# Patient Record
Sex: FEMALE | Race: WHITE | NOT HISPANIC OR LATINO | Employment: OTHER | URBAN - METROPOLITAN AREA
[De-identification: names, ages, dates, MRNs, and addresses within clinical notes are randomized per-mention and may not be internally consistent; named-entity substitution may affect disease eponyms.]

---

## 2017-04-12 ENCOUNTER — OFFICE VISIT (OUTPATIENT)
Dept: INTERNAL MEDICINE | Facility: CLINIC | Age: 27
End: 2017-04-12

## 2017-04-12 VITALS
WEIGHT: 150 LBS | HEIGHT: 62 IN | BODY MASS INDEX: 27.6 KG/M2 | SYSTOLIC BLOOD PRESSURE: 94 MMHG | DIASTOLIC BLOOD PRESSURE: 66 MMHG | TEMPERATURE: 97.9 F | HEART RATE: 68 BPM | RESPIRATION RATE: 19 BRPM

## 2017-04-12 DIAGNOSIS — E28.2 PCOS (POLYCYSTIC OVARIAN SYNDROME): ICD-10-CM

## 2017-04-12 DIAGNOSIS — B36.0 TINEA VERSICOLOR: ICD-10-CM

## 2017-04-12 DIAGNOSIS — Z13.6 SCREENING FOR CARDIOVASCULAR CONDITION: ICD-10-CM

## 2017-04-12 DIAGNOSIS — F41.1 GENERALIZED ANXIETY DISORDER: Primary | ICD-10-CM

## 2017-04-12 PROBLEM — K59.00 CONSTIPATION: Status: ACTIVE | Noted: 2017-04-12

## 2017-04-12 LAB
25(OH)D3 SERPL-MCNC: 18.4 NG/ML
ALBUMIN SERPL-MCNC: 4.4 G/DL (ref 3.2–4.8)
ALBUMIN/GLOB SERPL: 1.6 G/DL (ref 1.5–2.5)
ALP SERPL-CCNC: 34 U/L (ref 25–100)
ALT SERPL W P-5'-P-CCNC: 18 U/L (ref 7–40)
ANION GAP SERPL CALCULATED.3IONS-SCNC: 5 MMOL/L (ref 3–11)
AST SERPL-CCNC: 13 U/L (ref 0–33)
BASOPHILS # BLD AUTO: 0.02 10*3/MM3 (ref 0–0.2)
BASOPHILS NFR BLD AUTO: 0.4 % (ref 0–1)
BILIRUB SERPL-MCNC: 0.3 MG/DL (ref 0.3–1.2)
BUN BLD-MCNC: 11 MG/DL (ref 9–23)
BUN/CREAT SERPL: 13.8 (ref 7–25)
CALCIUM SPEC-SCNC: 9.9 MG/DL (ref 8.7–10.4)
CHLORIDE SERPL-SCNC: 104 MMOL/L (ref 99–109)
CHOLEST SERPL-MCNC: 191 MG/DL (ref 0–200)
CO2 SERPL-SCNC: 29 MMOL/L (ref 20–31)
CREAT BLD-MCNC: 0.8 MG/DL (ref 0.6–1.3)
DEPRECATED RDW RBC AUTO: 44 FL (ref 37–54)
EOSINOPHIL # BLD AUTO: 0.2 10*3/MM3 (ref 0.1–0.3)
EOSINOPHIL NFR BLD AUTO: 4.1 % (ref 0–3)
ERYTHROCYTE [DISTWIDTH] IN BLOOD BY AUTOMATED COUNT: 13.1 % (ref 11.3–14.5)
EXPIRATION DATE: NORMAL
GFR SERPL CREATININE-BSD FRML MDRD: 88 ML/MIN/1.73
GLOBULIN UR ELPH-MCNC: 2.8 GM/DL
GLUCOSE BLD-MCNC: 69 MG/DL (ref 70–100)
HBA1C MFR BLD: 5.1 %
HCT VFR BLD AUTO: 43.1 % (ref 34.5–44)
HDLC SERPL-MCNC: 81 MG/DL (ref 40–60)
HGB BLD-MCNC: 14.2 G/DL (ref 11.5–15.5)
IMM GRANULOCYTES # BLD: 0 10*3/MM3 (ref 0–0.03)
IMM GRANULOCYTES NFR BLD: 0 % (ref 0–0.6)
LYMPHOCYTES # BLD AUTO: 1.84 10*3/MM3 (ref 0.6–4.8)
LYMPHOCYTES NFR BLD AUTO: 37.6 % (ref 24–44)
Lab: NORMAL
MCH RBC QN AUTO: 30.4 PG (ref 27–31)
MCHC RBC AUTO-ENTMCNC: 32.9 G/DL (ref 32–36)
MCV RBC AUTO: 92.3 FL (ref 80–99)
MONOCYTES # BLD AUTO: 0.5 10*3/MM3 (ref 0–1)
MONOCYTES NFR BLD AUTO: 10.2 % (ref 0–12)
NEUTROPHILS # BLD AUTO: 2.33 10*3/MM3 (ref 1.5–8.3)
NEUTROPHILS NFR BLD AUTO: 47.7 % (ref 41–71)
PLATELET # BLD AUTO: 254 10*3/MM3 (ref 150–450)
PMV BLD AUTO: 10.1 FL (ref 6–12)
POTASSIUM BLD-SCNC: 4 MMOL/L (ref 3.5–5.5)
PROT SERPL-MCNC: 7.2 G/DL (ref 5.7–8.2)
RBC # BLD AUTO: 4.67 10*6/MM3 (ref 3.89–5.14)
SODIUM BLD-SCNC: 138 MMOL/L (ref 132–146)
T4 FREE SERPL-MCNC: 1.03 NG/DL (ref 0.89–1.76)
TSH SERPL DL<=0.05 MIU/L-ACNC: 1.35 MIU/ML (ref 0.35–5.35)
WBC NRBC COR # BLD: 4.89 10*3/MM3 (ref 3.5–10.8)

## 2017-04-12 PROCEDURE — 83718 ASSAY OF LIPOPROTEIN: CPT | Performed by: INTERNAL MEDICINE

## 2017-04-12 PROCEDURE — 82306 VITAMIN D 25 HYDROXY: CPT | Performed by: INTERNAL MEDICINE

## 2017-04-12 PROCEDURE — 80053 COMPREHEN METABOLIC PANEL: CPT | Performed by: INTERNAL MEDICINE

## 2017-04-12 PROCEDURE — 84439 ASSAY OF FREE THYROXINE: CPT | Performed by: INTERNAL MEDICINE

## 2017-04-12 PROCEDURE — 85025 COMPLETE CBC W/AUTO DIFF WBC: CPT | Performed by: INTERNAL MEDICINE

## 2017-04-12 PROCEDURE — 36415 COLL VENOUS BLD VENIPUNCTURE: CPT | Performed by: INTERNAL MEDICINE

## 2017-04-12 PROCEDURE — 99214 OFFICE O/P EST MOD 30 MIN: CPT | Performed by: INTERNAL MEDICINE

## 2017-04-12 PROCEDURE — 82465 ASSAY BLD/SERUM CHOLESTEROL: CPT | Performed by: INTERNAL MEDICINE

## 2017-04-12 PROCEDURE — 83036 HEMOGLOBIN GLYCOSYLATED A1C: CPT | Performed by: INTERNAL MEDICINE

## 2017-04-12 PROCEDURE — 84443 ASSAY THYROID STIM HORMONE: CPT | Performed by: INTERNAL MEDICINE

## 2017-04-12 RX ORDER — KETOCONAZOLE 20 MG/G
CREAM TOPICAL 2 TIMES DAILY
Qty: 60 G | Refills: 0 | Status: SHIPPED | OUTPATIENT
Start: 2017-04-12 | End: 2017-04-26

## 2017-04-12 RX ORDER — NORGESTIMATE AND ETHINYL ESTRADIOL 0.25-0.035
KIT ORAL
COMMUNITY
Start: 2014-09-16 | End: 2018-04-26

## 2017-04-12 RX ORDER — KETOCONAZOLE 20 MG/G
CREAM TOPICAL 2 TIMES DAILY
COMMUNITY
Start: 2014-09-16 | End: 2017-04-12 | Stop reason: SDUPTHER

## 2017-04-12 RX ORDER — ESCITALOPRAM OXALATE 10 MG/1
5 TABLET ORAL DAILY
Qty: 30 TABLET | Refills: 5 | Status: SHIPPED | OUTPATIENT
Start: 2017-04-12 | End: 2017-04-18 | Stop reason: SINTOL

## 2017-04-12 NOTE — PROGRESS NOTES
Subjective       Martina Hopkins is a 26 y.o. female.     Chief Complaint   Patient presents with   • Anxiety       History obtained from the patient.    The patient is on ocp's and Metformin for PCOS, but she feels like her hormones are out of whack.      Anxiety   Presents for initial visit. Episode onset: about 2 years ago. The problem has been gradually worsening. Symptoms include insomnia, nervous/anxious behavior, palpitations, panic and shortness of breath. Patient reports no chest pain, compulsions, confusion, decreased concentration, depressed mood, dizziness, excessive worry, feeling of choking, hyperventilation, irritability, malaise, muscle tension, nausea, obsessions, restlessness or suicidal ideas. Symptoms occur most days. The severity of symptoms is interfering with daily activities. Nothing aggravates the symptoms. The quality of sleep is fair. Nighttime awakenings: one to two.     Risk factors include family history. Past treatments include nothing.        The following portions of the patient's history were reviewed and updated as appropriate: allergies, current medications, past family history, past medical history, past social history, past surgical history and problem list.      Review of Systems   Constitutional: Negative for fatigue, irritability and unexpected weight change.   Respiratory: Positive for chest tightness and shortness of breath.    Cardiovascular: Positive for palpitations. Negative for chest pain and leg swelling.   Gastrointestinal: Positive for blood in stool (x 1 week). Negative for abdominal pain, constipation, diarrhea, nausea and vomiting.        Denies melena.     Endocrine: Negative for cold intolerance, heat intolerance, polydipsia and polyuria.        Denies hair loss and dry skin.   Genitourinary: Positive for menstrual problem. Negative for dysuria, frequency, hematuria, pelvic pain and urgency.   Musculoskeletal: Negative for arthralgias, joint swelling and  myalgias.   Skin: Positive for rash (recurrent tinea versicolor on neck, shoulders, chest, and arms x several weeks).   Neurological: Negative for dizziness, light-headedness, numbness and headaches.        Denies memory issues.   Psychiatric/Behavioral: Positive for sleep disturbance. Negative for confusion, decreased concentration and suicidal ideas. The patient is nervous/anxious and has insomnia.          Blood pressure 94/66, pulse 68, temperature 97.9 °F (36.6 °C), temperature source Temporal Artery , resp. rate 19, weight 150 lb (68 kg).      Objective     Physical Exam   Constitutional: She appears well-developed and well-nourished.   Neck: Normal range of motion. Neck supple. Carotid bruit is not present. No thyromegaly present.   Cardiovascular: Normal rate, regular rhythm, normal heart sounds and intact distal pulses.  Exam reveals no gallop and no friction rub.    No murmur heard.  No peripheral edema.   Pulmonary/Chest: Effort normal and breath sounds normal.   Abdominal: Soft. Bowel sounds are normal. She exhibits no distension, no abdominal bruit and no mass. There is no hepatosplenomegaly. There is no tenderness.   Skin: Rash (hypopigmented macular rash on neck, shoulders, chest, and arms) noted.   Psychiatric: She has a normal mood and affect.   Nursing note and vitals reviewed.     Lab Results   Component Value Date    HGBA1C 5.1 04/12/2017           Assessment/Plan   Martina was seen today for anxiety.    Diagnoses and all orders for this visit:    Generalized anxiety disorder  -     escitalopram (LEXAPRO) 10 MG tablet; Take 0.5 tablets by mouth Daily.    PCOS (polycystic ovarian syndrome)  -     POC Glycosylated Hemoglobin (Hb A1C)  -     Comprehensive Metabolic Panel  -     TSH  -     T4, Free  -     Vitamin D 25 Hydroxy  -     CBC & Differential  -     CBC Auto Differential    Tinea versicolor  -     ketoconazole (NIZORAL) 2 % cream; Apply  topically 2 (Two) Times a Day for 14  days.    Screening for cardiovascular condition  -     Cholesterol, Total  -     HDL Cholesterol    Return in about 3 weeks (around 5/3/2017) for Recheck-Anxiety.

## 2017-04-18 ENCOUNTER — TELEPHONE (OUTPATIENT)
Dept: INTERNAL MEDICINE | Facility: CLINIC | Age: 27
End: 2017-04-18

## 2017-04-18 DIAGNOSIS — F41.9 ANXIETY: Primary | ICD-10-CM

## 2017-04-18 RX ORDER — HYDROXYZINE HYDROCHLORIDE 25 MG/1
TABLET, FILM COATED ORAL
Qty: 50 TABLET | Refills: 0 | Status: SHIPPED | OUTPATIENT
Start: 2017-04-18 | End: 2017-10-03 | Stop reason: SDUPTHER

## 2017-04-18 NOTE — TELEPHONE ENCOUNTER
Please call in hydroxyzine 25 mg, 1/2-1 by mouth every 6 hours when necessary anxiety, #[phone], no refill.

## 2017-04-18 NOTE — TELEPHONE ENCOUNTER
Spoke with pt and she states the Lexapro made her feel slow motion and just needs something for an as needed basis, because she don't feel like she has anxiety everyday.

## 2017-05-10 ENCOUNTER — OFFICE VISIT (OUTPATIENT)
Dept: INTERNAL MEDICINE | Facility: CLINIC | Age: 27
End: 2017-05-10

## 2017-05-10 ENCOUNTER — HOSPITAL ENCOUNTER (OUTPATIENT)
Dept: GENERAL RADIOLOGY | Facility: HOSPITAL | Age: 27
Discharge: HOME | End: 2017-05-10
Attending: INTERNAL MEDICINE | Admitting: INTERNAL MEDICINE

## 2017-05-10 VITALS
RESPIRATION RATE: 20 BRPM | DIASTOLIC BLOOD PRESSURE: 64 MMHG | HEART RATE: 70 BPM | BODY MASS INDEX: 28.07 KG/M2 | SYSTOLIC BLOOD PRESSURE: 92 MMHG | WEIGHT: 151 LBS | TEMPERATURE: 98.6 F

## 2017-05-10 DIAGNOSIS — F41.1 GENERALIZED ANXIETY DISORDER: ICD-10-CM

## 2017-05-10 DIAGNOSIS — M79.674 PAIN OF TOE OF RIGHT FOOT: Primary | ICD-10-CM

## 2017-05-10 PROBLEM — E55.9 VITAMIN D DEFICIENCY: Status: ACTIVE | Noted: 2017-05-10

## 2017-05-10 PROCEDURE — 99214 OFFICE O/P EST MOD 30 MIN: CPT | Performed by: INTERNAL MEDICINE

## 2017-05-10 PROCEDURE — 73630 X-RAY EXAM OF FOOT: CPT

## 2017-05-15 ENCOUNTER — TELEPHONE (OUTPATIENT)
Dept: INTERNAL MEDICINE | Facility: CLINIC | Age: 27
End: 2017-05-15

## 2017-05-15 DIAGNOSIS — M79.671 RIGHT FOOT PAIN: Primary | ICD-10-CM

## 2017-10-03 ENCOUNTER — TELEPHONE (OUTPATIENT)
Dept: INTERNAL MEDICINE | Facility: CLINIC | Age: 27
End: 2017-10-03

## 2017-10-03 DIAGNOSIS — F41.9 ANXIETY: ICD-10-CM

## 2017-10-03 RX ORDER — HYDROXYZINE HYDROCHLORIDE 25 MG/1
TABLET, FILM COATED ORAL
Qty: 50 TABLET | Refills: 0 | Status: SHIPPED | OUTPATIENT
Start: 2017-10-03 | End: 2017-10-11 | Stop reason: SDUPTHER

## 2017-10-11 DIAGNOSIS — F41.9 ANXIETY: ICD-10-CM

## 2017-10-11 RX ORDER — HYDROXYZINE HYDROCHLORIDE 25 MG/1
25 TABLET, FILM COATED ORAL DAILY
Qty: 30 TABLET | Refills: 11 | Status: SHIPPED | OUTPATIENT
Start: 2017-10-11 | End: 2018-10-23 | Stop reason: SDUPTHER

## 2017-10-11 NOTE — TELEPHONE ENCOUNTER
Rx sent to [facility] [address] [place]       LMOVM for Martina to return call Office # given

## 2017-10-11 NOTE — TELEPHONE ENCOUNTER
[phone]    PT NEEDS REIFLL OF HYDROXYZINE    [facility]

## 2018-02-28 ENCOUNTER — TELEPHONE (OUTPATIENT)
Dept: INTERNAL MEDICINE | Facility: CLINIC | Age: 28
End: 2018-02-28

## 2018-02-28 RX ORDER — KETOCONAZOLE 20 MG/G
CREAM TOPICAL 2 TIMES DAILY
Qty: 60 G | Refills: 1 | Status: SHIPPED | OUTPATIENT
Start: 2018-02-28 | End: 2018-05-10

## 2018-02-28 NOTE — TELEPHONE ENCOUNTER
----- Message from Elle Longo sent at 2/28/2018 12:15 PM EST -----  [phone]    NEEDS REFILL OF THE NIZORAL CREAM-RASH HAS COME BACK    GRUPO JULIAN

## 2018-04-26 ENCOUNTER — OFFICE VISIT (OUTPATIENT)
Dept: INTERNAL MEDICINE | Facility: CLINIC | Age: 28
End: 2018-04-26

## 2018-04-26 VITALS
SYSTOLIC BLOOD PRESSURE: 100 MMHG | HEART RATE: 80 BPM | DIASTOLIC BLOOD PRESSURE: 62 MMHG | TEMPERATURE: 98.1 F | WEIGHT: 167.13 LBS | RESPIRATION RATE: 20 BRPM | BODY MASS INDEX: 31.07 KG/M2

## 2018-04-26 DIAGNOSIS — F41.1 GENERALIZED ANXIETY DISORDER: ICD-10-CM

## 2018-04-26 DIAGNOSIS — R53.83 OTHER FATIGUE: ICD-10-CM

## 2018-04-26 DIAGNOSIS — R06.02 SHORTNESS OF BREATH: Primary | ICD-10-CM

## 2018-04-26 LAB
25(OH)D3 SERPL-MCNC: 22.7 NG/ML
ALBUMIN SERPL-MCNC: 4.6 G/DL (ref 3.2–4.8)
ALBUMIN/GLOB SERPL: 1.9 G/DL (ref 1.5–2.5)
ALP SERPL-CCNC: 57 U/L (ref 25–100)
ALT SERPL W P-5'-P-CCNC: 18 U/L (ref 7–40)
ANION GAP SERPL CALCULATED.3IONS-SCNC: 9 MMOL/L (ref 3–11)
AST SERPL-CCNC: 14 U/L (ref 0–33)
BASOPHILS # BLD AUTO: 0.02 10*3/MM3 (ref 0–0.2)
BASOPHILS NFR BLD AUTO: 0.3 % (ref 0–1)
BILIRUB SERPL-MCNC: 0.5 MG/DL (ref 0.3–1.2)
BUN BLD-MCNC: 8 MG/DL (ref 9–23)
BUN/CREAT SERPL: 11.4 (ref 7–25)
CALCIUM SPEC-SCNC: 9.4 MG/DL (ref 8.7–10.4)
CHLORIDE SERPL-SCNC: 107 MMOL/L (ref 99–109)
CO2 SERPL-SCNC: 25 MMOL/L (ref 20–31)
CREAT BLD-MCNC: 0.7 MG/DL (ref 0.6–1.3)
DEPRECATED RDW RBC AUTO: 40.7 FL (ref 37–54)
EOSINOPHIL # BLD AUTO: 0.06 10*3/MM3 (ref 0–0.3)
EOSINOPHIL NFR BLD AUTO: 0.8 % (ref 0–3)
ERYTHROCYTE [DISTWIDTH] IN BLOOD BY AUTOMATED COUNT: 12.7 % (ref 11.3–14.5)
GFR SERPL CREATININE-BSD FRML MDRD: 102 ML/MIN/1.73
GLOBULIN UR ELPH-MCNC: 2.4 GM/DL
GLUCOSE BLD-MCNC: 94 MG/DL (ref 70–100)
HCT VFR BLD AUTO: 41.6 % (ref 34.5–44)
HGB BLD-MCNC: 14 G/DL (ref 11.5–15.5)
IMM GRANULOCYTES # BLD: 0.02 10*3/MM3 (ref 0–0.03)
IMM GRANULOCYTES NFR BLD: 0.3 % (ref 0–0.6)
LYMPHOCYTES # BLD AUTO: 2.23 10*3/MM3 (ref 0.6–4.8)
LYMPHOCYTES NFR BLD AUTO: 30.5 % (ref 24–44)
MCH RBC QN AUTO: 30.1 PG (ref 27–31)
MCHC RBC AUTO-ENTMCNC: 33.7 G/DL (ref 32–36)
MCV RBC AUTO: 89.5 FL (ref 80–99)
MONOCYTES # BLD AUTO: 0.39 10*3/MM3 (ref 0–1)
MONOCYTES NFR BLD AUTO: 5.3 % (ref 0–12)
NEUTROPHILS # BLD AUTO: 4.6 10*3/MM3 (ref 1.5–8.3)
NEUTROPHILS NFR BLD AUTO: 63.1 % (ref 41–71)
PLATELET # BLD AUTO: 303 10*3/MM3 (ref 150–450)
PMV BLD AUTO: 10.3 FL (ref 6–12)
POTASSIUM BLD-SCNC: 3.8 MMOL/L (ref 3.5–5.5)
PROT SERPL-MCNC: 7 G/DL (ref 5.7–8.2)
RBC # BLD AUTO: 4.65 10*6/MM3 (ref 3.89–5.14)
SODIUM BLD-SCNC: 141 MMOL/L (ref 132–146)
T4 FREE SERPL-MCNC: 1.13 NG/DL (ref 0.89–1.76)
TSH SERPL DL<=0.05 MIU/L-ACNC: 0.86 MIU/ML (ref 0.35–5.35)
VIT B12 BLD-MCNC: 348 PG/ML (ref 211–911)
WBC NRBC COR # BLD: 7.3 10*3/MM3 (ref 3.5–10.8)

## 2018-04-26 PROCEDURE — 85025 COMPLETE CBC W/AUTO DIFF WBC: CPT | Performed by: INTERNAL MEDICINE

## 2018-04-26 PROCEDURE — 84439 ASSAY OF FREE THYROXINE: CPT | Performed by: INTERNAL MEDICINE

## 2018-04-26 PROCEDURE — 99214 OFFICE O/P EST MOD 30 MIN: CPT | Performed by: INTERNAL MEDICINE

## 2018-04-26 PROCEDURE — 80053 COMPREHEN METABOLIC PANEL: CPT | Performed by: INTERNAL MEDICINE

## 2018-04-26 PROCEDURE — 84443 ASSAY THYROID STIM HORMONE: CPT | Performed by: INTERNAL MEDICINE

## 2018-04-26 PROCEDURE — 82607 VITAMIN B-12: CPT | Performed by: INTERNAL MEDICINE

## 2018-04-26 PROCEDURE — 82306 VITAMIN D 25 HYDROXY: CPT | Performed by: INTERNAL MEDICINE

## 2018-04-26 RX ORDER — LEVALBUTEROL INHALATION SOLUTION 0.63 MG/3ML
0.63 SOLUTION RESPIRATORY (INHALATION) ONCE
Status: DISCONTINUED | OUTPATIENT
Start: 2018-04-26 | End: 2019-08-02

## 2018-04-26 RX ORDER — ESCITALOPRAM OXALATE 10 MG/1
5 TABLET ORAL DAILY
Qty: 30 TABLET | Refills: 0 | Status: SHIPPED | OUTPATIENT
Start: 2018-04-26 | End: 2018-05-01

## 2018-04-26 NOTE — PROGRESS NOTES
"Christiano Hopkins is a 27 y.o. female.     Chief Complaint   Patient presents with   • Anxiety     6 month follow up       • Shortness of Breath       History obtained from the patient.      Anxiety   Presents for follow-up (Anxiety no better.) visit. Symptoms include excessive worry, insomnia, irritability, muscle tension, nervous/anxious behavior, palpitations, restlessness and shortness of breath. Patient reports no chest pain, compulsions, confusion, decreased concentration, depressed mood, dizziness, malaise, nausea, obsessions, panic or suicidal ideas. Episode frequency: daily, mostly after work and in the evening. Duration: all night. The severity of symptoms is moderate. The quality of sleep is fair. Nighttime awakenings: early a.m. for rest of night.     There is no history of asthma. Compliance with medications: Good.  Takes hydroxyzine as needed approximately once per week. Treatment side effects: drowsy in am.   Shortness of Breath   This is a new problem. Episode onset: x 2 years. The problem occurs daily (feels like she \"can't get a full breath in\"). The problem has been unchanged. Pertinent negatives include no abdominal pain, chest pain, ear pain, fever, headaches, hemoptysis, leg swelling, orthopnea, PND, rhinorrhea, sore throat, syncope, vomiting or wheezing. 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 Associated symptoms comments: Yawns a lot  . Treatments tried: Claritin. The treatment provided mild relief. Her past medical history is significant for allergies. There is no history of asthma.      The patient states she tried Lexapro in the past, and it caused numbness, but she only took it for 1 day.      No additional exercise besides work on the farm.    She has a history of PCOS, taking Metformin, but off ocp's x 1 year.    Labs done 4/12/17 were normal with the exception of a low Vitamin D level.  She is taking otc " Vitamin D3, 2000IU daily.    The following portions of the patient's history were reviewed and updated as appropriate: allergies, current medications, past family history, past medical history, past social history, past surgical history and problem list.      Review of Systems   Constitutional: Positive for fatigue and irritability. Negative for fever and unexpected weight change.   HENT: Positive for postnasal drip. Negative for congestion, ear pain, rhinorrhea, sinus pain, sinus pressure, sore throat, trouble swallowing and voice change.    Respiratory: Positive for cough (non-productive in am) and shortness of breath. Negative for hemoptysis, choking and wheezing.    Cardiovascular: Positive for palpitations. Negative for chest pain, orthopnea, leg swelling, syncope and PND.   Gastrointestinal: Negative for abdominal pain, constipation, diarrhea, nausea and vomiting.   Endocrine: Positive for polyphagia. Negative for cold intolerance, heat intolerance, polydipsia and polyuria.        Denies hair loss and dry skin.   Genitourinary: Negative for menstrual problem.   Musculoskeletal: Positive for back pain. Negative for arthralgias and myalgias.   Neurological: Negative for dizziness, light-headedness, numbness and headaches.        Denies memory loss. Denies tingling.   Hematological: Negative for adenopathy.   Psychiatric/Behavioral: Positive for sleep disturbance. Negative for confusion, decreased concentration and suicidal ideas. The patient is nervous/anxious and has insomnia.            Objective     Blood pressure 100/62, pulse 80, temperature 98.1 °F (36.7 °C), temperature source Temporal Artery , resp. rate 20, weight 75.8 kg (167 lb 2 oz).    Physical Exam   Constitutional:   Obese.   HENT:   Head: Normocephalic and atraumatic.   Right Ear: Tympanic membrane, external ear and ear canal normal.   Left Ear: Tympanic membrane, external ear and ear canal normal.   Mouth/Throat: Oropharynx is clear and moist  and mucous membranes are normal. No oral lesions.   Tonsils normal.  No sinus tenderness to palpation.   Eyes: Conjunctivae are normal.   Neck: Normal range of motion. Neck supple. Carotid bruit is not present. No thyromegaly present.   Cardiovascular: Normal rate, regular rhythm, normal heart sounds and intact distal pulses.  Exam reveals no gallop and no friction rub.    No murmur heard.  No peripheral edema.   Pulmonary/Chest: Effort normal and breath sounds normal.   Abdominal: Soft. Bowel sounds are normal. She exhibits no distension, no abdominal bruit and no mass. There is no hepatosplenomegaly. There is no tenderness.   Lymphadenopathy:     She has no cervical adenopathy.   Skin: No rash noted.   Psychiatric: She has a normal mood and affect.   Nursing note and vitals reviewed.    Spirometry Pre and Post Bronchodilator:  Normal.    Assessment/Plan   Martina was seen today for anxiety and shortness of breath.    Diagnoses and all orders for this visit:    Shortness of breath  -     Spirometry With Bronchodilator  -     levalbuterol (XOPENEX) nebulizer solution 0.63 mg; Take 3 mL by nebulization 1 (One) Time.    Generalized anxiety disorder  -     escitalopram (LEXAPRO) 10 MG tablet; Take 0.5 tablets by mouth Daily.    Other fatigue  -     CBC & Differential  -     Comprehensive Metabolic Panel  -     Vitamin D 25 Hydroxy  -     TSH  -     T4, Free  -     Vitamin B12  -     CBC Auto Differential            Return in about 2 weeks (around 5/10/2018).

## 2018-05-01 ENCOUNTER — TELEPHONE (OUTPATIENT)
Dept: INTERNAL MEDICINE | Facility: CLINIC | Age: 28
End: 2018-05-01

## 2018-05-01 RX ORDER — BUPROPION HYDROCHLORIDE 150 MG/1
150 TABLET ORAL DAILY
Qty: 30 TABLET | Refills: 5 | Status: SHIPPED | OUTPATIENT
Start: 2018-05-01 | End: 2018-09-05

## 2018-05-01 NOTE — TELEPHONE ENCOUNTER
PT STATES AFTER TAKING THE LEXAPRO 10MG 1/2 TAB LAST NIGHT, ALL HER BODY IS BURNING AND TINGLING, FEELS VERY EMOTIONAL ALSO, VOMITTING COUPLE TIMES AFTER TAKING BUT THAT HAS RESOLVED WANTS TO KNOW IF SHE SHOULD TAKE BENADRYL OR ANY OTHER RECOMMENDATIONS

## 2018-05-01 NOTE — TELEPHONE ENCOUNTER
Allergy list updated.  Notified pharmacy and patient.    Rx for WEllbutrin sent to pharmacy.  Martina notified   Verb understanding given to keep follow up appt

## 2018-05-01 NOTE — TELEPHONE ENCOUNTER
----- Message from Birdie Murphy sent at 5/1/2018  8:32 AM EDT -----  Contact: SELF  JEANNETTE LEON TOOK HER 2ND DOSE OF LEXAPRO LAST NIGHT AND BELIEVES SHE WAS HAVING AN ALLERGIC REACTION AS SHE WAS UNABLE TO SLEEP LAST NIGHT AND HAS BEEN THROWING UP THIS MORNING. SHE ALSO SAID HER ARMS FEEL LIKE THEY HAVE ICY/ HOT ON THEM AND SHE HAS BEEN VERY EMOTIONAL. JEANNETTE CAN BE REACHED [phone]

## 2018-05-10 ENCOUNTER — OFFICE VISIT (OUTPATIENT)
Dept: INTERNAL MEDICINE | Facility: CLINIC | Age: 28
End: 2018-05-10

## 2018-05-10 VITALS
HEART RATE: 76 BPM | DIASTOLIC BLOOD PRESSURE: 68 MMHG | WEIGHT: 166.38 LBS | SYSTOLIC BLOOD PRESSURE: 110 MMHG | BODY MASS INDEX: 30.93 KG/M2 | RESPIRATION RATE: 20 BRPM | TEMPERATURE: 97.7 F

## 2018-05-10 DIAGNOSIS — F41.1 GENERALIZED ANXIETY DISORDER: Primary | ICD-10-CM

## 2018-05-10 DIAGNOSIS — Z23 NEED FOR HEPATITIS A VACCINATION: ICD-10-CM

## 2018-05-10 PROCEDURE — 90632 HEPA VACCINE ADULT IM: CPT | Performed by: INTERNAL MEDICINE

## 2018-05-10 PROCEDURE — 90471 IMMUNIZATION ADMIN: CPT | Performed by: INTERNAL MEDICINE

## 2018-05-10 PROCEDURE — 99213 OFFICE O/P EST LOW 20 MIN: CPT | Performed by: INTERNAL MEDICINE

## 2018-05-10 NOTE — PATIENT INSTRUCTIONS
Recommended Hydroxyzine at bedtime.  Start Wellbutrin if daily anxiety persists.    The patient agrees to call back and schedule a 6-8 week follow up appointment or physical if she starts the Wellbutrin XL.

## 2018-05-10 NOTE — PROGRESS NOTES
Subjective       Martina Hopkins is a 27 y.o. female.     Chief Complaint   Patient presents with   • Anxiety     2 week follow up        History obtained from the patient.    The patient was seen 4/26/18 for Dyspnea.  Spirometry and labs were normal, with the exception of a slightly low Vitamin D level.  She was also seen for Anxiety.  Lexapro was started.    Anxiety   Presents for follow-up visit. Symptoms include nausea (with Metformin), nervous/anxious behavior (unchanged) and shortness of breath (stable (? allergies per patient)). Patient reports no chest pain, compulsions, confusion, decreased concentration, depressed mood, dizziness, excessive worry, hyperventilation, insomnia, irritability, malaise, muscle tension, obsessions, palpitations, panic or suicidal ideas. Episode frequency: mostly at night. The severity of symptoms is mild. The quality of sleep is good. Nighttime awakenings: early a.m. for rest of night (eventually goes back to sleep).     Compliance with medications: stopped Lexapro after 2 days.  Wellbutrin XL called in, but not started yet.  Has taken Hydroxyzine once since last visit.  Has cut out caffeine. Treatment side effects: vomiting, skin burning.        Current Outpatient Prescriptions on File Prior to Visit   Medication Sig Dispense Refill   • hydrOXYzine (ATARAX) 25 MG tablet Take 1 tablet by mouth Daily. (Patient taking differently: Take 25 mg by mouth Daily As Needed.) 30 tablet 11   • buPROPion XL (WELLBUTRIN XL) 150 MG 24 hr tablet Take 1 tablet by mouth Daily. 30 tablet 5   • metFORMIN (GLUCOPHAGE) 500 MG tablet Take 1 tablet by mouth 2 (Two) Times a Day.     • [DISCONTINUED] ketoconazole (NIZORAL) 2 % cream Apply  topically 2 (Two) Times a Day. 60 g 1     Current Facility-Administered Medications on File Prior to Visit   Medication Dose Route Frequency Provider Last Rate Last Dose   • levalbuterol (XOPENEX) nebulizer solution 0.63 mg  0.63 mg Nebulization Once Taylor Ventura MD            Current outpatient and discharge medications have been reconciled for the patient.  Reviewed by: Taylor Ventura MD        The following portions of the patient's history were reviewed and updated as appropriate: allergies, current medications, past family history, past medical history, past social history, past surgical history and problem list.    Review of Systems   Constitutional: Negative for fatigue, irritability and unexpected weight change.   Respiratory: Positive for shortness of breath (stable (? allergies per patient)).    Cardiovascular: Negative for chest pain and palpitations.   Gastrointestinal: Positive for nausea (with Metformin). Negative for abdominal pain, diarrhea and vomiting.   Neurological: Negative for dizziness and light-headedness.   Psychiatric/Behavioral: Positive for sleep disturbance. Negative for confusion, decreased concentration and suicidal ideas. The patient is nervous/anxious (unchanged). The patient does not have insomnia.          Objective       Blood pressure 110/68, pulse 76, temperature 97.7 °F (36.5 °C), temperature source Temporal Artery , resp. rate 20, weight 75.5 kg (166 lb 6 oz).      Physical Exam   Constitutional:   Borderline obese.   Neurological: She is alert.   Psychiatric: She has a normal mood and affect.   Vitals reviewed.    Counseling was given to patient for the following topics: discussed labs and diagnostic tests performed last visit or since last visit, risks and benefits of treament options, side effects of medications, stress increase in the patient's life, symptoms of anxiety and symptoms of depression . Total time of the encounter was 15 minutes and 15 minutes was spent counseling.      Assessment / Plan:  Martina was seen today for anxiety.    Diagnoses and all orders for this visit:    Generalized anxiety disorder   Recommended Hydroxyzine at bedtime.  Start Wellbutrin if daily anxiety persists.    Need for hepatitis A vaccination  -      Hepatitis A Vaccine Adult IM          Follow-up:  Return for as needed.     The patient agrees to call back and schedule a 6-8 week follow up appointment or physical if she starts the Wellbutrin XL.

## 2018-09-05 ENCOUNTER — OFFICE VISIT (OUTPATIENT)
Dept: INTERNAL MEDICINE | Facility: CLINIC | Age: 28
End: 2018-09-05

## 2018-09-05 VITALS
WEIGHT: 168.56 LBS | RESPIRATION RATE: 20 BRPM | BODY MASS INDEX: 31.33 KG/M2 | TEMPERATURE: 97.8 F | SYSTOLIC BLOOD PRESSURE: 120 MMHG | DIASTOLIC BLOOD PRESSURE: 80 MMHG | HEART RATE: 80 BPM

## 2018-09-05 DIAGNOSIS — J30.2 CHRONIC SEASONAL ALLERGIC RHINITIS, UNSPECIFIED TRIGGER: ICD-10-CM

## 2018-09-05 DIAGNOSIS — J01.80 OTHER ACUTE SINUSITIS, RECURRENCE NOT SPECIFIED: Primary | ICD-10-CM

## 2018-09-05 PROCEDURE — 99214 OFFICE O/P EST MOD 30 MIN: CPT | Performed by: INTERNAL MEDICINE

## 2018-09-05 RX ORDER — LORATADINE 10 MG/1
CAPSULE, LIQUID FILLED ORAL
COMMUNITY
End: 2019-08-02

## 2018-09-05 RX ORDER — MONTELUKAST SODIUM 10 MG/1
10 TABLET ORAL NIGHTLY
Qty: 30 TABLET | Refills: 11 | Status: SHIPPED | OUTPATIENT
Start: 2018-09-05 | End: 2021-10-01 | Stop reason: ALTCHOICE

## 2018-09-05 RX ORDER — FLUTICASONE PROPIONATE 50 MCG
2 SPRAY, SUSPENSION (ML) NASAL DAILY
COMMUNITY
End: 2019-08-02

## 2018-09-05 RX ORDER — METHYLPREDNISOLONE 4 MG/1
TABLET ORAL
Qty: 1 EACH | Refills: 0 | Status: SHIPPED | OUTPATIENT
Start: 2018-09-05 | End: 2019-08-02

## 2018-09-05 RX ORDER — AMOXICILLIN 875 MG/1
875 TABLET, COATED ORAL 2 TIMES DAILY
Qty: 20 TABLET | Refills: 0 | Status: SHIPPED | OUTPATIENT
Start: 2018-09-05 | End: 2018-09-12 | Stop reason: ALTCHOICE

## 2018-09-05 NOTE — PROGRESS NOTES
Subjective       Martina Hopkins is a 28 y.o. female.     Chief Complaint   Patient presents with   • URI   • Sinusitis       History obtained from the patient.      URI    This is a new problem. Episode onset: 2 weeks ago. The problem has been gradually worsening. There has been no fever. Associated symptoms include congestion (head and chest), coughing (productive of yellow sputum), joint pain, nausea, neck pain, a rash (arms), rhinorrhea (clear), sinus pain and sneezing. Pertinent negatives include no abdominal pain, chest pain, diarrhea, ear pain, headaches, joint swelling, sore throat, swollen glands, vomiting or wheezing. Treatments tried: Claritin, Flonase, and Sudafed, and added a borrowed Singulair recently. The treatment provided mild relief.        She works on a farm.    The following portions of the patient's history were reviewed and updated as appropriate: allergies, current medications, past family history, past medical history, past social history, past surgical history and problem list.      Review of Systems   Constitutional: Positive for fatigue. Negative for chills and fever.   HENT: Positive for congestion (head and chest), postnasal drip, rhinorrhea (clear), sinus pain, sinus pressure and sneezing. Negative for ear discharge, ear pain, sore throat and voice change.    Eyes: Positive for pain, redness and itching. Negative for discharge.   Respiratory: Positive for cough (productive of yellow sputum). Negative for shortness of breath and wheezing.    Cardiovascular: Negative for chest pain.   Gastrointestinal: Positive for nausea. Negative for abdominal pain, diarrhea and vomiting.   Musculoskeletal: Positive for arthralgias, joint pain, myalgias and neck pain. Negative for joint swelling and neck stiffness.   Skin: Positive for rash (arms).   Neurological: Negative for headaches.   Hematological: Negative for adenopathy.           Objective     Blood pressure 120/80, pulse 80, temperature 97.8  °F (36.6 °C), temperature source Temporal Artery , resp. rate 20, weight 76.5 kg (168 lb 9 oz).    Physical Exam   Constitutional:   Obese.   HENT:   Head: Normocephalic and atraumatic.   Right Ear: Tympanic membrane, external ear and ear canal normal.   Left Ear: Tympanic membrane, external ear and ear canal normal.   Mouth/Throat: Oropharynx is clear and moist and mucous membranes are normal. No oral lesions.   Tonsils normal.  No sinus tenderness to palpation.   Eyes: Conjunctivae are normal.   Neck: Normal range of motion. Neck supple.   Cardiovascular: Normal rate and regular rhythm.    No murmur heard.  Pulmonary/Chest: Effort normal and breath sounds normal.   Lymphadenopathy:     She has no cervical adenopathy.   Skin: No rash noted.   Psychiatric: She has a normal mood and affect.   Nursing note and vitals reviewed.        Assessment/Plan   Martina was seen today for uri and sinusitis.    Diagnoses and all orders for this visit:    Other acute sinusitis, recurrence not specified  -     amoxicillin (AMOXIL) 875 MG tablet; Take 1 tablet by mouth 2 (Two) Times a Day for 10 days.    Chronic seasonal allergic rhinitis, unspecified trigger (worsening)  -     montelukast (SINGULAIR) 10 MG tablet; Take 1 tablet by mouth Every Night.  -     Ambulatory Referral to Allergy  -     MethylPREDNISolone (MEDROL, NATTY,) 4 MG tablet; Take as directed on package instructions.    Continue current allergy medication.  Plenty of fluids.        Return if symptoms worsen or fail to improve.

## 2018-09-07 ENCOUNTER — TELEPHONE (OUTPATIENT)
Dept: INTERNAL MEDICINE | Facility: CLINIC | Age: 28
End: 2018-09-07

## 2018-09-07 DIAGNOSIS — R11.10 VOMITING, INTRACTABILITY OF VOMITING NOT SPECIFIED, PRESENCE OF NAUSEA NOT SPECIFIED, UNSPECIFIED VOMITING TYPE: Primary | ICD-10-CM

## 2018-09-07 RX ORDER — ONDANSETRON 8 MG/1
8 TABLET, ORALLY DISINTEGRATING ORAL EVERY 8 HOURS PRN
Qty: 30 TABLET | Refills: 0 | Status: SHIPPED | OUTPATIENT
Start: 2018-09-07 | End: 2019-08-02

## 2018-09-07 NOTE — TELEPHONE ENCOUNTER
Patient informed, verb good understanding. States she only took the steroid one day but has been taking the antibiotic. States she does not know how she is be taking it but states she is being allergy tested on Tuesday and does not want to take the steriod and have it effect her allergy testing. States also her amoxicilin is a different color/shape then normal. Advised to contact the pharmacy. Verb good understanding

## 2018-09-07 NOTE — TELEPHONE ENCOUNTER
Karsten Lombardi Celeste M, MA   Caller: Unspecified (Today,  3:26 PM)             PT CALLED YOU BACK   [phone]

## 2018-09-07 NOTE — TELEPHONE ENCOUNTER
----- Message from Birdie Murphy sent at 9/7/2018  2:27 PM EDT -----  Contact: SELF  JEANNETTE QUINTERO CALLING IN REGARDS TO THE AMOXICILLIN SHE WAS PRESCRIBED. SHE SAYS SHE KEEPS THROWING UP AND WANTS TO KNOW IF THAT COULD BE DUE TO THE MEDICATION OR SINUS INFECTION. SHE CAN BE REACHED [phone].

## 2018-09-07 NOTE — TELEPHONE ENCOUNTER
More likely due to the steroids.  Recommend Zofran TID prn (I have e-rx'd), and continue current medication, if she can tolerate.

## 2018-09-12 ENCOUNTER — OFFICE VISIT (OUTPATIENT)
Dept: INTERNAL MEDICINE | Facility: CLINIC | Age: 28
End: 2018-09-12

## 2018-09-12 VITALS
SYSTOLIC BLOOD PRESSURE: 104 MMHG | BODY MASS INDEX: 30.86 KG/M2 | RESPIRATION RATE: 21 BRPM | TEMPERATURE: 97.6 F | DIASTOLIC BLOOD PRESSURE: 66 MMHG | WEIGHT: 166 LBS | HEART RATE: 86 BPM

## 2018-09-12 DIAGNOSIS — J20.9 ACUTE BRONCHITIS, UNSPECIFIED ORGANISM: Primary | ICD-10-CM

## 2018-09-12 DIAGNOSIS — R05.9 COUGH: ICD-10-CM

## 2018-09-12 PROCEDURE — 99213 OFFICE O/P EST LOW 20 MIN: CPT | Performed by: INTERNAL MEDICINE

## 2018-09-12 RX ORDER — AZITHROMYCIN 250 MG/1
TABLET, FILM COATED ORAL
Qty: 6 TABLET | Refills: 0 | Status: SHIPPED | OUTPATIENT
Start: 2018-09-12 | End: 2019-08-02

## 2018-09-12 RX ORDER — BENZONATATE 100 MG/1
100 CAPSULE ORAL 3 TIMES DAILY PRN
Qty: 30 CAPSULE | Refills: 1 | Status: SHIPPED | OUTPATIENT
Start: 2018-09-12 | End: 2019-08-02

## 2018-09-12 NOTE — PROGRESS NOTES
Subjective       Martina Hopkins is a 28 y.o. female.     Chief Complaint   Patient presents with   • Cough       History obtained from the patient.    The patient was seen 9/5/18, diagnosed with Acute Sinusitis and started on Amoxicillin.  She states she has allergy testing scheduled for 9/17/18.      Cough   Chronicity: persistent. Episode onset: 3 weeks ago. The problem has been gradually worsening. The cough is productive of purulent sputum (wet, with post tussive emesis). Associated symptoms include ear congestion, nasal congestion (mild), shortness of breath and wheezing. Pertinent negatives include no chest pain, chills, ear pain, eye redness, fever, headaches, hemoptysis, myalgias, postnasal drip, rash, rhinorrhea or sore throat. The symptoms are aggravated by lying down. Treatments tried: Dayquil, cough drops, Amoxicillin and a Prednisone taper.  also on Singulair and taking Benadryl at night. The treatment provided no relief. Her past medical history is significant for environmental allergies. There is no history of asthma.        The following portions of the patient's history were reviewed and updated as appropriate: allergies, current medications, past family history, past medical history, past social history, past surgical history and problem list.      Review of Systems   Constitutional: Negative for chills, fatigue and fever.   HENT: Positive for congestion (chest) and voice change (hoarse). Negative for ear pain, postnasal drip, rhinorrhea, sinus pain, sinus pressure and sore throat.    Eyes: Negative for pain, discharge, redness and itching.   Respiratory: Positive for cough, chest tightness, shortness of breath and wheezing. Negative for hemoptysis.    Cardiovascular: Negative for chest pain.   Gastrointestinal: Negative for abdominal pain, diarrhea, nausea and vomiting (except post tussive).   Musculoskeletal: Negative for myalgias.   Skin: Negative for rash.   Allergic/Immunologic: Positive for  environmental allergies.   Neurological: Negative for headaches.   Hematological: Negative for adenopathy.           Objective     Blood pressure 104/66, pulse 86, temperature 97.6 °F (36.4 °C), temperature source Temporal Artery , resp. rate 21, weight 75.3 kg (166 lb).    Physical Exam   Constitutional:   Overweight.   HENT:   Head: Normocephalic and atraumatic.   Right Ear: Tympanic membrane, external ear and ear canal normal.   Left Ear: Tympanic membrane, external ear and ear canal normal.   Mouth/Throat: Oropharynx is clear and moist and mucous membranes are normal. No oral lesions.   Tonsils normal.  No sinus tenderness to palpation.   Eyes: Conjunctivae are normal.   Neck: Normal range of motion. Neck supple.   Cardiovascular: Normal rate and regular rhythm.    No murmur heard.  Pulmonary/Chest: Effort normal and breath sounds normal.   Lymphadenopathy:     She has no cervical adenopathy.   Skin: No rash noted.   Psychiatric: She has a normal mood and affect.   Nursing note and vitals reviewed.        Assessment/Plan    Martina was seen today for cough.    Diagnoses and all orders for this visit:    Acute bronchitis, unspecified organism  -     azithromycin (ZITHROMAX Z-NATTY) 250 MG tablet; Take 2 tablets the first day, then 1 tablet daily for 4 days.    Cough  -     benzonatate (TESSALON PERLES) 100 MG capsule; Take 1 capsule by mouth 3 (Three) Times a Day As Needed for Cough.    Discontinue Amoxicillin.  Continue other medications.        Return if symptoms worsen or fail to improve.

## 2018-10-23 ENCOUNTER — TELEPHONE (OUTPATIENT)
Dept: INTERNAL MEDICINE | Facility: CLINIC | Age: 28
End: 2018-10-23

## 2018-10-23 DIAGNOSIS — F41.9 ANXIETY: ICD-10-CM

## 2018-10-23 RX ORDER — HYDROXYZINE HYDROCHLORIDE 25 MG/1
25 TABLET, FILM COATED ORAL DAILY PRN
Qty: 30 TABLET | Refills: 11 | Status: SHIPPED | OUTPATIENT
Start: 2018-10-23 | End: 2020-06-24 | Stop reason: SDUPTHER

## 2018-10-23 NOTE — TELEPHONE ENCOUNTER
----- Message from Beatriz Constantino sent at 10/23/2018 10:07 AM EDT -----  PATIENT IS NEEDING A REFILL ON hydrOXYzine (ATARAX) 25 MG tablet    [facility] [address] AT [facility] - [phone] [facility] [phone] FX    PATIENT CALL BACK : [phone]    THANK YOU

## 2018-10-23 NOTE — TELEPHONE ENCOUNTER
----- Message from Beatriz Constantino sent at 10/23/2018 10:07 AM EDT -----  PATIENT IS NEEDING A REFILL ON hydrOXYzine (ATARAX) 25 MG tablet    [facility] [address] AT [facility] - [phone] [facility] [phone] FX    PATIENT CALL BACK : [phone]    THANK YOU

## 2019-02-28 ENCOUNTER — OFFICE VISIT (OUTPATIENT)
Dept: INTERNAL MEDICINE | Facility: CLINIC | Age: 29
End: 2019-02-28

## 2019-02-28 VITALS
TEMPERATURE: 97.5 F | OXYGEN SATURATION: 99 % | SYSTOLIC BLOOD PRESSURE: 110 MMHG | DIASTOLIC BLOOD PRESSURE: 70 MMHG | HEIGHT: 63 IN | RESPIRATION RATE: 18 BRPM | BODY MASS INDEX: 29.77 KG/M2 | HEART RATE: 73 BPM | WEIGHT: 168 LBS

## 2019-02-28 DIAGNOSIS — H00.012 HORDEOLUM EXTERNUM OF RIGHT LOWER EYELID: Primary | ICD-10-CM

## 2019-02-28 PROCEDURE — 99213 OFFICE O/P EST LOW 20 MIN: CPT | Performed by: PHYSICIAN ASSISTANT

## 2019-02-28 NOTE — PROGRESS NOTES
Chief Complaint   Patient presents with   • Stye     right eye, x1 week       Subjective       History of Present Illness     Martina Hopkins is a 28 y.o. female. She presents with 1 week history of stye. Pt has a stye on R lower lid. She reports that this has been present 1 week, but worsening x1 day. She had some drainage when she pushed on stye this AM. She has pressure and pain at stye. She has never had a stye before. She does wear contacts, but has contacted her optometrist and ordered glasses to wear this week. She has used warm compresses, but no other tx for stye. No blurred vision, itchy eye, red eye or any other symptoms today.       The following portions of the patient's history were reviewed and updated as appropriate: allergies, current medications, past family history, past medical history, past social history, past surgical history and problem list.    Allergies   Allergen Reactions   • Gildess 1.5-30 [Norethindrone-Eth Estradiol] Other (See Comments)     Excessive bleeding   • Lexapro [Escitalopram Oxalate] Other (See Comments)     Body tingling and burning feeling  Vomiting excessive crying      Social History     Tobacco Use   • Smoking status: Never Smoker   Substance Use Topics   • Alcohol use: Yes     Comment: occasionally         Current Outpatient Medications:   •  azithromycin (ZITHROMAX Z-NATTY) 250 MG tablet, Take 2 tablets the first day, then 1 tablet daily for 4 days., Disp: 6 tablet, Rfl: 0  •  benzonatate (TESSALON PERLES) 100 MG capsule, Take 1 capsule by mouth 3 (Three) Times a Day As Needed for Cough., Disp: 30 capsule, Rfl: 1  •  fluticasone (FLONASE) 50 MCG/ACT nasal spray, 2 sprays into the nostril(s) as directed by provider Daily., Disp: , Rfl:   •  hydrOXYzine (ATARAX) 25 MG tablet, Take 1 tablet by mouth Daily As Needed for Anxiety., Disp: 30 tablet, Rfl: 11  •  Loratadine (CLARITIN) 10 MG capsule, Take  by mouth., Disp: , Rfl:   •  metFORMIN (GLUCOPHAGE) 500 MG tablet, Take 1  tablet by mouth 2 (Two) Times a Day., Disp: , Rfl:   •  MethylPREDNISolone (MEDROL, NATTY,) 4 MG tablet, Take as directed on package instructions., Disp: 1 each, Rfl: 0  •  montelukast (SINGULAIR) 10 MG tablet, Take 1 tablet by mouth Every Night., Disp: 30 tablet, Rfl: 11  •  ondansetron ODT (ZOFRAN ODT) 8 MG disintegrating tablet, Take 1 tablet by mouth Every 8 (Eight) Hours As Needed for Nausea or Vomiting., Disp: 30 tablet, Rfl: 0    Current Facility-Administered Medications:   •  levalbuterol (XOPENEX) nebulizer solution 0.63 mg, 0.63 mg, Nebulization, Once, Taylor Ventura MD    Review of Systems   Constitutional: Negative for chills, fatigue and fever.   HENT: Negative for congestion, ear pain and sore throat.    Eyes: Positive for discharge. Negative for blurred vision, pain, itching and visual disturbance.        +stye   Respiratory: Negative for cough.    Cardiovascular: Negative for chest pain.   Gastrointestinal: Negative for abdominal pain, diarrhea, nausea and vomiting.   Genitourinary: Negative for dysuria.   Allergic/Immunologic: Negative for immunocompromised state.   Neurological: Negative for dizziness and headache.       Objective   Vitals:    02/28/19 1541   BP: 110/70   Pulse: 73   Resp: 18   Temp: 97.5 °F (36.4 °C)   SpO2: 99%     Physical Exam   Constitutional: She appears well-developed and well-nourished.   HENT:   Head: Normocephalic and atraumatic.   Right Ear: External ear normal.   Left Ear: External ear normal.   Nose: Nose normal.   Mouth/Throat: Oropharynx is clear and moist.   Eyes: Conjunctivae and EOM are normal. Pupils are equal, round, and reactive to light. Right eye exhibits hordeolum.   +small stye noted at R lower lid, medial aspect. No active drainage noted.    Neck: Normal range of motion. Neck supple.   Cardiovascular: Normal rate and regular rhythm.   No murmur heard.  Pulmonary/Chest: Effort normal and breath sounds normal. She has no wheezes. She has no rales.    Lymphadenopathy:     She has no cervical adenopathy.         Assessment/Plan   Martina was seen today for stye.    Diagnoses and all orders for this visit:    Hordeolum externum of right lower eyelid      Simple stye with no signs of additional infection.  Discussed continuing warm moist compresses.  If pain worsens or new eye symptoms develop, RTC or see optometrist.          Return if symptoms worsen or fail to improve.

## 2019-08-02 ENCOUNTER — OFFICE VISIT (OUTPATIENT)
Dept: INTERNAL MEDICINE | Facility: CLINIC | Age: 29
End: 2019-08-02

## 2019-08-02 VITALS
SYSTOLIC BLOOD PRESSURE: 110 MMHG | BODY MASS INDEX: 29.12 KG/M2 | RESPIRATION RATE: 18 BRPM | HEART RATE: 93 BPM | TEMPERATURE: 98.1 F | WEIGHT: 164.38 LBS | OXYGEN SATURATION: 99 % | DIASTOLIC BLOOD PRESSURE: 66 MMHG
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
## 2019-08-02 DIAGNOSIS — M54.2 NECK PAIN: ICD-10-CM

## 2019-08-02 PROCEDURE — 99213 OFFICE O/P EST LOW 20 MIN: CPT | Performed by: NURSE PRACTITIONER

## 2019-08-02 RX ORDER — CYCLOBENZAPRINE HCL 5 MG
5 TABLET ORAL 3 TIMES DAILY PRN
Qty: 21 TABLET | Refills: 0 | Status: SHIPPED | OUTPATIENT
Start: 2019-08-02 | End: 2020-02-19

## 2019-08-02 RX ORDER — METFORMIN HYDROCHLORIDE 500 MG/1
TABLET, EXTENDED RELEASE ORAL
COMMUNITY
Start: 2019-06-11 | End: 2020-06-24 | Stop reason: SDUPTHER
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
## 2019-10-07 ENCOUNTER — OFFICE VISIT (OUTPATIENT)
Dept: INTERNAL MEDICINE | Facility: CLINIC | Age: 29
End: 2019-10-07

## 2019-10-07 VITALS
OXYGEN SATURATION: 100 % | HEIGHT: 63 IN | RESPIRATION RATE: 16 BRPM | WEIGHT: 167 LBS | DIASTOLIC BLOOD PRESSURE: 62 MMHG | TEMPERATURE: 97.6 F | SYSTOLIC BLOOD PRESSURE: 110 MMHG | HEART RATE: 62 BPM | BODY MASS INDEX: 29.59 KG/M2

## 2019-10-07 DIAGNOSIS — J02.9 ACUTE PHARYNGITIS, UNSPECIFIED ETIOLOGY: Primary | ICD-10-CM

## 2019-10-07 LAB
EXPIRATION DATE: NORMAL
INTERNAL CONTROL: NORMAL
Lab: NORMAL
S PYO AG THROAT QL: NEGATIVE

## 2019-10-07 PROCEDURE — 87880 STREP A ASSAY W/OPTIC: CPT | Performed by: INTERNAL MEDICINE

## 2019-10-07 PROCEDURE — 99214 OFFICE O/P EST MOD 30 MIN: CPT | Performed by: INTERNAL MEDICINE

## 2019-10-07 RX ORDER — AMOXICILLIN 875 MG/1
875 TABLET, COATED ORAL 2 TIMES DAILY
Qty: 20 TABLET | Refills: 0 | Status: SHIPPED | OUTPATIENT
Start: 2019-10-07 | End: 2019-10-17

## 2019-10-07 NOTE — PROGRESS NOTES
Subjective       Maritna Hopkins is a 29 y.o. female.     Chief Complaint   Patient presents with   • Sore Throat     since Wednesday, swollen tonsils/sore, yellow mucus, lethargic, cough       History obtained from the patient.    Patient feels like she has Strep.      Sore Throat    This is a new problem. Episode onset: 4-5 days ago. The problem has been unchanged. Neither side of throat is experiencing more pain than the other. There has been no fever. Associated symptoms include coughing (mild dry, occasionally productive of yellow sputum), a hoarse voice and swollen glands. Pertinent negatives include no abdominal pain, congestion, diarrhea, ear discharge, ear pain, headaches, plugged ear sensation, neck pain, shortness of breath or vomiting. She has had no exposure to strep or mono. Treatments tried: Dayquil and Pepto Bismoll, and Ricola lozenges. The treatment provided mild relief.   Rash   This is a new problem. The current episode started yesterday. The problem has been gradually worsening since onset. The affected locations include the back, left shoulder and right shoulder. The rash is characterized by redness and itchiness. She was exposed to nothing. Associated symptoms include coughing (mild dry, occasionally productive of yellow sputum), fatigue and a sore throat. Pertinent negatives include no congestion, diarrhea, eye pain, fever, rhinorrhea, shortness of breath or vomiting. Past treatments include nothing. (H/o Tinea Versicolor in the past)        The following portions of the patient's history were reviewed and updated as appropriate: allergies, current medications, past family history, past medical history, past social history, past surgical history and problem list.      Review of Systems   Constitutional: Positive for fatigue. Negative for appetite change, chills and fever.   HENT: Positive for hoarse voice, postnasal drip, sneezing, sore throat and voice change. Negative for congestion, ear  "discharge, ear pain, rhinorrhea, sinus pressure and sinus pain.    Eyes: Negative for pain, discharge, redness and itching.   Respiratory: Positive for cough (mild dry, occasionally productive of yellow sputum). Negative for shortness of breath and wheezing.    Cardiovascular: Negative for chest pain.   Gastrointestinal: Negative for abdominal pain, diarrhea, nausea and vomiting.   Musculoskeletal: Negative for arthralgias, joint swelling, myalgias, neck pain and neck stiffness.   Skin: Positive for rash.   Neurological: Negative for headaches.   Hematological: Positive for adenopathy.           Objective     Blood pressure 110/62, pulse 62, temperature 97.6 °F (36.4 °C), temperature source Temporal, resp. rate 16, height 160 cm (63\"), weight 75.8 kg (167 lb), SpO2 100 %.    Physical Exam   Constitutional:   Overweight.   HENT:   Head: Normocephalic and atraumatic.   Right Ear: Tympanic membrane, external ear and ear canal normal.   Left Ear: Tympanic membrane, external ear and ear canal normal.   Mouth/Throat: Mucous membranes are normal. No oral lesions. Posterior oropharyngeal erythema present. No oropharyngeal exudate. Tonsils are 1+ on the right. Tonsils are 1+ on the left. No tonsillar exudate (erythema +).   Tonsils normal.  No sinus tenderness to palpation.   Eyes: Conjunctivae are normal.   Neck: Normal range of motion. Neck supple.   Cardiovascular: Normal rate and regular rhythm.   No murmur heard.  Pulmonary/Chest: Effort normal and breath sounds normal.   Lymphadenopathy:     She has no cervical adenopathy.   Skin: Rash (erythematous, papular rash upper back and bilateral posterior shoulder) noted.   Psychiatric: She has a normal mood and affect.   Nursing note and vitals reviewed.      Results for orders placed or performed in visit on 10/07/19   POC Rapid Strep A   Result Value Ref Range    Rapid Strep A Screen Negative Negative, VALID, INVALID, Not Performed    Internal Control Passed Passed    Lot " Number [id number]     Expiration Date 02/28/2021        Assessment/Plan   Martina was seen today for sore throat.    Diagnoses and all orders for this visit:    Acute pharyngitis, unspecified etiology  -     POC Rapid Strep A  -     amoxicillin (AMOXIL) 875 MG tablet; Take 1 tablet by mouth 2 (Two) Times a Day for 10 days.    Continue current over the counter medication, and plenty of fluids.        Return if symptoms worsen or fail to improve.

## 2019-12-19 ENCOUNTER — OFFICE VISIT (OUTPATIENT)
Dept: INTERNAL MEDICINE | Facility: CLINIC | Age: 29
End: 2019-12-19

## 2019-12-19 VITALS
RESPIRATION RATE: 20 BRPM | HEART RATE: 72 BPM | WEIGHT: 170.38 LBS | TEMPERATURE: 98.1 F | SYSTOLIC BLOOD PRESSURE: 100 MMHG | BODY MASS INDEX: 30.18 KG/M2 | DIASTOLIC BLOOD PRESSURE: 60 MMHG

## 2019-12-19 DIAGNOSIS — J02.9 SORE THROAT: ICD-10-CM

## 2019-12-19 DIAGNOSIS — R52 BODY ACHES: ICD-10-CM

## 2019-12-19 DIAGNOSIS — J02.0 STREP PHARYNGITIS: Primary | ICD-10-CM

## 2019-12-19 LAB
EXPIRATION DATE: ABNORMAL
EXPIRATION DATE: NORMAL
FLUAV AG NPH QL: NEGATIVE
FLUBV AG NPH QL: NEGATIVE
INTERNAL CONTROL: ABNORMAL
INTERNAL CONTROL: NORMAL
Lab: ABNORMAL
Lab: NORMAL
S PYO AG THROAT QL: POSITIVE

## 2019-12-19 PROCEDURE — 87804 INFLUENZA ASSAY W/OPTIC: CPT | Performed by: INTERNAL MEDICINE

## 2019-12-19 PROCEDURE — 99214 OFFICE O/P EST MOD 30 MIN: CPT | Performed by: INTERNAL MEDICINE

## 2019-12-19 PROCEDURE — 87880 STREP A ASSAY W/OPTIC: CPT | Performed by: INTERNAL MEDICINE

## 2019-12-19 RX ORDER — AMOXICILLIN 875 MG/1
875 TABLET, COATED ORAL 2 TIMES DAILY
Qty: 20 TABLET | Refills: 0 | Status: SHIPPED | OUTPATIENT
Start: 2019-12-19 | End: 2019-12-29

## 2019-12-19 RX ORDER — FLUCONAZOLE 150 MG/1
150 TABLET ORAL ONCE
Qty: 2 TABLET | Refills: 0 | Status: SHIPPED | OUTPATIENT
Start: 2019-12-19 | End: 2019-12-19

## 2019-12-19 NOTE — PROGRESS NOTES
Subjective       Martina Hopkins is a 29 y.o. female.     Chief Complaint   Patient presents with   • Fatigue   • Generalized Body Aches       History obtained from the patient.      Sore Throat    This is a new problem. Episode onset: 2 days ago. The problem has been gradually worsening. There has been no fever. The pain is mild. Associated symptoms include congestion (chest), coughing (dry), a hoarse voice, neck pain and swollen glands. Pertinent negatives include no abdominal pain, diarrhea, ear discharge, ear pain, headaches, shortness of breath or vomiting. She has had no exposure to strep or mono. Treatments tried: Ibuprofen, Dayquil, and Nyquil. The treatment provided mild relief.        The following portions of the patient's history were reviewed and updated as appropriate: allergies, current medications, past family history, past medical history, past social history, past surgical history and problem list.      Review of Systems   Constitutional: Positive for chills and fatigue. Negative for appetite change and fever.   HENT: Positive for congestion (chest), hoarse voice, sore throat and voice change. Negative for ear discharge, ear pain, postnasal drip, rhinorrhea, sinus pressure, sinus pain and sneezing.    Eyes: Negative for pain, discharge, redness and itching.   Respiratory: Positive for cough (dry) and chest tightness (pressuer). Negative for shortness of breath and wheezing.    Cardiovascular: Negative for chest pain.   Gastrointestinal: Negative for abdominal pain, diarrhea, nausea and vomiting.   Musculoskeletal: Positive for arthralgias, myalgias, neck pain and neck stiffness. Negative for joint swelling.   Skin: Negative for rash.   Neurological: Negative for headaches.   Hematological: Positive for adenopathy.           Objective     Blood pressure 100/60, pulse 72, temperature 98.1 °F (36.7 °C), temperature source Temporal, resp. rate 20, weight 77.3 kg (170 lb 6 oz).    Physical Exam    Constitutional:   Overweight, borderline obese   HENT:   Head: Normocephalic and atraumatic.   Right Ear: Tympanic membrane, external ear and ear canal normal.   Left Ear: Tympanic membrane, external ear and ear canal normal.   Mouth/Throat: Mucous membranes are normal. No oral lesions. Posterior oropharyngeal erythema present. No oropharyngeal exudate. Tonsils are 1+ on the right. Tonsils are 1+ on the left. No tonsillar exudate (erythema +).   No sinus tenderness to palpation.   Eyes: Conjunctivae are normal.   Neck: Normal range of motion. Neck supple.   No cervical spine tenderness   Cardiovascular: Normal rate and regular rhythm.   No murmur heard.  Pulmonary/Chest: Effort normal and breath sounds normal.   Abdominal: Soft. Bowel sounds are normal. She exhibits no mass. There is no hepatosplenomegaly. There is no tenderness.   Lymphadenopathy:     She has no cervical adenopathy (tender mildly enlarged bilateral anterior, but not posterior cervical lymph nodes).   Skin: No rash noted.   Psychiatric: She has a normal mood and affect.   Nursing note and vitals reviewed.      Results for orders placed or performed in visit on 12/19/19   POC Rapid Strep A   Result Value Ref Range    Rapid Strep A Screen Positive (A) Negative, VALID, INVALID, Not Performed    Internal Control Passed Passed    Lot Number [id number]     Expiration Date 2-28-21    POC Influenza A / B   Result Value Ref Range    Rapid Influenza A Ag Negative Negative    Rapid Influenza B Ag Negative Negative    Internal Control Passed Passed    Lot Number [id number]     Expiration Date 5-31-21        Assessment/Plan   Martina was seen today for fatigue and generalized body aches.    Diagnoses and all orders for this visit:    Strep pharyngitis  -     amoxicillin (AMOXIL) 875 MG tablet; Take 1 tablet by mouth 2 (Two) Times a Day for 10 days.  -     fluconazole (DIFLUCAN) 150 MG tablet; Take 1 tablet by mouth 1 (One) Time for 1 dose. May repeat after 4  days (prophylactic for yeast infection).   Continue current over the counter medication, and plenty of fluids.    Sore throat  -     POC Rapid Strep A    Body aches  -     POC Influenza A / B        Return if symptoms worsen or fail to improve.

## 2020-01-02 ENCOUNTER — OFFICE VISIT (OUTPATIENT)
Dept: INTERNAL MEDICINE | Facility: CLINIC | Age: 30
End: 2020-01-02

## 2020-01-02 VITALS
DIASTOLIC BLOOD PRESSURE: 60 MMHG | SYSTOLIC BLOOD PRESSURE: 100 MMHG | BODY MASS INDEX: 30.84 KG/M2 | OXYGEN SATURATION: 97 % | TEMPERATURE: 98.3 F | HEART RATE: 95 BPM | RESPIRATION RATE: 20 BRPM | WEIGHT: 174.13 LBS

## 2020-01-02 DIAGNOSIS — H44.001 EYE INFECTION, RIGHT: Primary | ICD-10-CM

## 2020-01-02 DIAGNOSIS — H10.31 ACUTE BACTERIAL CONJUNCTIVITIS OF RIGHT EYE: ICD-10-CM

## 2020-01-02 PROCEDURE — 99213 OFFICE O/P EST LOW 20 MIN: CPT | Performed by: INTERNAL MEDICINE

## 2020-01-02 RX ORDER — POLYMYXIN B SULFATE AND TRIMETHOPRIM 1; 10000 MG/ML; [USP'U]/ML
SOLUTION OPHTHALMIC
Qty: 10 ML | Refills: 0 | Status: SHIPPED | OUTPATIENT
Start: 2020-01-02 | End: 2020-02-19

## 2020-01-02 NOTE — PROGRESS NOTES
Subjective   Martina Hopkins is a 29 y.o. female.     History of Present Illness     The following portions of the patient's history were reviewed and updated as appropriate: allergies, current medications, past family history, past medical history, past social history, past surgical history and problem list.    Right eye redness  Duration 1 week   Sx has been having redness and irritation of the eye for the past 1 week.  Mild URI symptoms, minimal cough, no fever, no chills, no nausea, vomiting or diarrhea, no other symptoms.      Review of Systems   All other systems reviewed and are negative.      Objective   Physical Exam   Constitutional: She appears well-developed and well-nourished.   HENT:   Head: Normocephalic.   Left Ear: External ear normal.   Nose: Nose normal.   Eyes: Pupils are equal, round, and reactive to light. Conjunctivae and EOM are normal.   Cardiovascular: Normal rate, regular rhythm and normal heart sounds.   Pulmonary/Chest: Effort normal and breath sounds normal.   Abdominal: Soft. Bowel sounds are normal.   Musculoskeletal: Normal range of motion.   Neurological: She is alert.   Nursing note and vitals reviewed.        Assessment/Plan   Martina was seen today for conjunctivitis.    Diagnoses and all orders for this visit:    Eye infection, right-clinically resolved    Acute bacterial conjunctivitis of right eye  -     trimethoprim-polymyxin b (POLYTRIM) 95925-8.1 UNIT/ML-% ophthalmic solution; One trop each eye TID x 8 day

## 2020-02-19 ENCOUNTER — OFFICE VISIT (OUTPATIENT)
Dept: INTERNAL MEDICINE | Facility: CLINIC | Age: 30
End: 2020-02-19

## 2020-02-19 VITALS
SYSTOLIC BLOOD PRESSURE: 100 MMHG | WEIGHT: 177.25 LBS | HEART RATE: 80 BPM | TEMPERATURE: 98.3 F | OXYGEN SATURATION: 99 % | BODY MASS INDEX: 31.4 KG/M2 | RESPIRATION RATE: 20 BRPM | DIASTOLIC BLOOD PRESSURE: 60 MMHG

## 2020-02-19 DIAGNOSIS — R05.9 COUGH: Primary | ICD-10-CM

## 2020-02-19 LAB
EXPIRATION DATE: NORMAL
EXPIRATION DATE: NORMAL
FLUAV AG NPH QL: NEGATIVE
FLUBV AG NPH QL: NEGATIVE
INTERNAL CONTROL: NORMAL
INTERNAL CONTROL: NORMAL
Lab: NORMAL
Lab: NORMAL
S PYO AG THROAT QL: NEGATIVE

## 2020-02-19 PROCEDURE — 87804 INFLUENZA ASSAY W/OPTIC: CPT | Performed by: INTERNAL MEDICINE

## 2020-02-19 PROCEDURE — 87880 STREP A ASSAY W/OPTIC: CPT | Performed by: INTERNAL MEDICINE

## 2020-02-19 PROCEDURE — 99213 OFFICE O/P EST LOW 20 MIN: CPT | Performed by: INTERNAL MEDICINE

## 2020-02-19 NOTE — PROGRESS NOTES
Subjective       Martina Hopkins is a 29 y.o. female.     Chief Complaint   Patient presents with   • Nasal Congestion   • Cough   • Headache   • Generalized Body Aches       History obtained from the patient.      Cough   This is a new problem. Episode onset: 2 days ago. The problem has been gradually worsening. The cough is non-productive (wet). Associated symptoms include headaches, myalgias, nasal congestion and rhinorrhea (clear). Pertinent negatives include no chest pain, chills, ear congestion (but itchy), ear pain, eye redness, fever, hemoptysis, postnasal drip, rash, sore throat, shortness of breath or wheezing. Nothing aggravates the symptoms. Treatments tried: Dayquil and Theraflu. The treatment provided mild relief. There is no history of asthma or environmental allergies.        The following portions of the patient's history were reviewed and updated as appropriate: allergies, current medications, past family history, past medical history, past social history, past surgical history and problem list.      Review of Systems   Constitutional: Negative for appetite change, chills and fever.   HENT: Positive for congestion, rhinorrhea (clear), sinus pressure and sneezing. Negative for ear pain, postnasal drip, sinus pain, sore throat and voice change.    Eyes: Negative for pain, discharge, redness and itching.   Respiratory: Positive for cough. Negative for hemoptysis, chest tightness, shortness of breath and wheezing.    Cardiovascular: Negative for chest pain.   Gastrointestinal: Negative for abdominal pain, diarrhea, nausea and vomiting.   Musculoskeletal: Positive for myalgias. Negative for arthralgias, joint swelling, neck pain and neck stiffness.   Skin: Negative for rash.   Allergic/Immunologic: Negative for environmental allergies.   Neurological: Positive for dizziness, light-headedness and headaches.   Hematological: Negative for adenopathy.           Objective     Blood pressure 100/60, pulse 80,  temperature 98.3 °F (36.8 °C), temperature source Temporal, resp. rate 20, weight 80.4 kg (177 lb 4 oz), SpO2 99 %, not currently breastfeeding.    Physical Exam   Constitutional:   Obese.   HENT:   Head: Normocephalic and atraumatic.   Right Ear: Tympanic membrane, external ear and ear canal normal.   Left Ear: Tympanic membrane, external ear and ear canal normal.   Mouth/Throat: Oropharynx is clear and moist and mucous membranes are normal. No oral lesions.   Tonsils normal.  No sinus tenderness to palpation.   Eyes: Conjunctivae are normal.   Neck: Normal range of motion. Neck supple.   Cardiovascular: Normal rate and regular rhythm.   No murmur heard.  Pulmonary/Chest: Effort normal and breath sounds normal.   Lymphadenopathy:     She has no cervical adenopathy.   Skin: No rash noted.   Psychiatric: She has a normal mood and affect.   Nursing note and vitals reviewed.      Results for orders placed or performed in visit on 02/19/20   POC Rapid Strep A   Result Value Ref Range    Rapid Strep A Screen Negative Negative, VALID, INVALID, Not Performed    Internal Control Passed Passed    Lot Number [id number]     Expiration Date 4-30-21    POC Influenza A / B   Result Value Ref Range    Rapid Influenza A Ag Negative Negative    Rapid Influenza B Ag Negative Negative    Internal Control Passed Passed    Lot Number [id number]     Expiration Date 6-30-21        Assessment/Plan   Martina was seen today for nasal congestion, cough, headache and generalized body aches.    Diagnoses and all orders for this visit:    Cough  -     POC Rapid Strep A  -     POC Influenza A / B     Recommend Mucinex, Ibuprofen, Tylenol, and plenty fluids    Return if symptoms worsen or fail to improve.

## 2020-06-24 ENCOUNTER — TELEPHONE (OUTPATIENT)
Dept: INTERNAL MEDICINE | Facility: CLINIC | Age: 30
End: 2020-06-24

## 2020-06-24 DIAGNOSIS — F41.9 ANXIETY: ICD-10-CM

## 2020-06-24 RX ORDER — FLUTICASONE PROPIONATE 50 MCG
2 SPRAY, SUSPENSION (ML) NASAL DAILY
Qty: 16 G | Refills: 1 | Status: SHIPPED | OUTPATIENT
Start: 2020-06-24 | End: 2022-12-02

## 2020-06-24 RX ORDER — LORATADINE 10 MG/1
10 TABLET ORAL DAILY
Qty: 30 TABLET | Refills: 1 | Status: SHIPPED | OUTPATIENT
Start: 2020-06-24 | End: 2022-05-12 | Stop reason: SDUPTHER

## 2020-06-24 RX ORDER — METFORMIN HYDROCHLORIDE 500 MG/1
TABLET, EXTENDED RELEASE ORAL
Status: CANCELLED | OUTPATIENT
Start: 2020-06-24

## 2020-06-24 RX ORDER — METFORMIN HYDROCHLORIDE 500 MG/1
500 TABLET, EXTENDED RELEASE ORAL
Qty: 30 TABLET | Refills: 1 | Status: SHIPPED | OUTPATIENT
Start: 2020-06-24 | End: 2020-07-10 | Stop reason: SDUPTHER

## 2020-06-24 RX ORDER — HYDROXYZINE HYDROCHLORIDE 25 MG/1
25 TABLET, FILM COATED ORAL DAILY PRN
Qty: 30 TABLET | Refills: 1 | Status: SHIPPED | OUTPATIENT
Start: 2020-06-24 | End: 2020-08-26

## 2020-06-26 ENCOUNTER — APPOINTMENT (OUTPATIENT)
Dept: GENERAL RADIOLOGY | Facility: HOSPITAL | Age: 30
End: 2020-06-26

## 2020-06-26 ENCOUNTER — APPOINTMENT (OUTPATIENT)
Dept: CT IMAGING | Facility: HOSPITAL | Age: 30
End: 2020-06-26

## 2020-06-26 ENCOUNTER — HOSPITAL ENCOUNTER (EMERGENCY)
Facility: HOSPITAL | Age: 30
Discharge: HOME | End: 2020-06-26
Attending: EMERGENCY MEDICINE | Admitting: EMERGENCY MEDICINE

## 2020-06-26 VITALS
DIASTOLIC BLOOD PRESSURE: 67 MMHG | HEART RATE: 73 BPM | OXYGEN SATURATION: 100 % | BODY MASS INDEX: 30.88 KG/M2 | WEIGHT: 167.8 LBS | HEIGHT: 62 IN | TEMPERATURE: 99.2 F | SYSTOLIC BLOOD PRESSURE: 114 MMHG | RESPIRATION RATE: 18 BRPM

## 2020-06-26 DIAGNOSIS — S80.11XA CONTUSION OF RIGHT LOWER LEG, INITIAL ENCOUNTER: ICD-10-CM

## 2020-06-26 DIAGNOSIS — S50.12XA CONTUSION OF LEFT FOREARM, INITIAL ENCOUNTER: ICD-10-CM

## 2020-06-26 DIAGNOSIS — S09.90XA CLOSED HEAD INJURY, INITIAL ENCOUNTER: Primary | ICD-10-CM

## 2020-06-26 LAB — B-HCG UR QL: NEGATIVE

## 2020-06-26 PROCEDURE — 73590 X-RAY EXAM OF LOWER LEG: CPT

## 2020-06-26 PROCEDURE — 70450 CT HEAD/BRAIN W/O DYE: CPT

## 2020-06-26 PROCEDURE — 81025 URINE PREGNANCY TEST: CPT | Performed by: NURSE PRACTITIONER

## 2020-06-26 PROCEDURE — 73090 X-RAY EXAM OF FOREARM: CPT

## 2020-06-26 PROCEDURE — 99284 EMERGENCY DEPT VISIT MOD MDM: CPT

## 2020-06-26 PROCEDURE — 72125 CT NECK SPINE W/O DYE: CPT

## 2020-06-26 NOTE — ED PROVIDER NOTES
Subjective   History of Present Illness  Is a 30-year-old female who comes in today complaining of pain after falling.  She reports that her horse bucked up throwing her from the horse in her landing on her head.  She complains of positive loss of consciousness.  She complains of neck pain and a headache.  She complains of left forearm and right calf pain after the horse stepped on her.  Review of Systems   Eyes: Negative.    Respiratory: Negative.    Cardiovascular: Negative.    Gastrointestinal: Negative.    Genitourinary: Negative.    Musculoskeletal: Positive for arthralgias.   Skin: Negative.    Neurological: Positive for weakness and headaches.   Psychiatric/Behavioral: Negative.        Past Medical History:   Diagnosis Date   • Constipation    • Generalized anxiety disorder    • History of abnormal cervical Pap smear 07/2013    repeat normal per patient   • History of colonoscopy 03/10/2014    normal except rectal irritation (bx neg)   • History of dog bite 09/2014    left wrist   • History of varicella    • PCOS (polycystic ovarian syndrome)     Dx 2014.   • Vitamin D deficiency     Dx 4/17       Allergies   Allergen Reactions   • Gildess 1.5-30 [Norethindrone-Eth Estradiol] Other (See Comments)     Excessive bleeding   • Lexapro [Escitalopram Oxalate] Other (See Comments)     Body tingling and burning feeling  Vomiting excessive crying        Past Surgical History:   Procedure Laterality Date   • NO PAST SURGERIES         Family History   Problem Relation Age of Onset   • Hypothyroidism Sister         due to thyroidectomy   • Thyroid cancer Sister    • Diabetes Maternal Uncle    • Diabetes Maternal Grandmother    • Coronary artery disease Maternal Grandfather    • Colon cancer Paternal Grandmother    • Diabetes Paternal Grandfather        Social History     Socioeconomic History   • Marital status: Single     Spouse name: Not on file   • Number of children: Not on file   • Years of education: Not on file    • Highest education level: Not on file   Tobacco Use   • Smoking status: Never Smoker   • Smokeless tobacco: Never Used   Substance and Sexual Activity   • Alcohol use: Yes     Comment: occasionally           Objective   Physical Exam   Constitutional: She appears well-developed and well-nourished.   Nursing note and vitals reviewed.  GEN: No acute distress  Head: Normocephalic, atraumatic  Eyes: Pupils equal round reactive to light  ENT: Posterior pharynx normal in appearance, oral mucosa is moist  c spine- tender without step off noted.   Chest: Nontender to palpation  Cardiovascular: Regular rate  Lungs: Clear to auscultation bilaterally  Abdomen: Soft, nontender, nondistended, no peritoneal signs  Extremities: bruising noted to right calf and left forearm.   Neuro: GCS 15  Psych: Mood and affect are appropriate    Procedures           ED Course  ED Course as of Jun 26 1651 Fri Jun 26, 2020 1649 I discussed the findings with the patient.  Of advised her to follow-up with her primary care in the next 1 4 to 48 hours.  I given her strict return to care instructions and she is agreeable to this plan of care.    [TW]      ED Course User Index  [TW] Columba Lucio, APRN                                           MDM  Number of Diagnoses or Management Options     Amount and/or Complexity of Data Reviewed  Clinical lab tests: ordered and reviewed  Tests in the radiology section of CPT®: ordered and reviewed  Review and summarize past medical records: yes  Discuss the patient with other providers: yes    Risk of Complications, Morbidity, and/or Mortality  Presenting problems: moderate  Diagnostic procedures: moderate  Management options: moderate        Final diagnoses:   Closed head injury, initial encounter   Contusion of left forearm, initial encounter   Contusion of right lower leg, initial encounter            Columba Lucio APRN  06/26/20 1651

## 2020-07-10 ENCOUNTER — OFFICE VISIT (OUTPATIENT)
Dept: INTERNAL MEDICINE | Facility: CLINIC | Age: 30
End: 2020-07-10

## 2020-07-10 VITALS
WEIGHT: 170.25 LBS | HEART RATE: 72 BPM | DIASTOLIC BLOOD PRESSURE: 64 MMHG | SYSTOLIC BLOOD PRESSURE: 90 MMHG | BODY MASS INDEX: 31.14 KG/M2 | TEMPERATURE: 98 F | RESPIRATION RATE: 20 BRPM

## 2020-07-10 DIAGNOSIS — R42 VERTIGO: ICD-10-CM

## 2020-07-10 DIAGNOSIS — E28.2 PCOS (POLYCYSTIC OVARIAN SYNDROME): ICD-10-CM

## 2020-07-10 DIAGNOSIS — S06.0X1D CONCUSSION WITH LOSS OF CONSCIOUSNESS OF 30 MINUTES OR LESS, SUBSEQUENT ENCOUNTER: Primary | ICD-10-CM

## 2020-07-10 PROCEDURE — 99214 OFFICE O/P EST MOD 30 MIN: CPT | Performed by: INTERNAL MEDICINE

## 2020-07-10 RX ORDER — MECLIZINE HYDROCHLORIDE 25 MG/1
25 TABLET ORAL 3 TIMES DAILY PRN
Qty: 30 TABLET | Refills: 0 | Status: SHIPPED | OUTPATIENT
Start: 2020-07-10 | End: 2020-10-15 | Stop reason: SDUPTHER

## 2020-07-10 RX ORDER — METFORMIN HYDROCHLORIDE 500 MG/1
1000 TABLET, EXTENDED RELEASE ORAL NIGHTLY
Qty: 30 TABLET | Refills: 1
Start: 2020-07-10 | End: 2021-10-01 | Stop reason: ALTCHOICE

## 2020-07-10 NOTE — PROGRESS NOTES
Subjective       Martina Hopkins is a 30 y.o. female.     Chief Complaint   Patient presents with   • Head Injury     [facility] ER follow up  6/26/2020    • Dizziness       History obtained from the patient.    The patient is here for an ED follow-up.  Records were received and reviewed.  The patient was seen at [facility] ED on 6/26/2020.  She states she fell off a horse,  hitting her head and losing consciousness.  She states the horse also stepped on her left ankle.  She landed on her left arm.  X-rays of the left forearm and left tib-fib were negative.  CT of the head without contrast was negative.  CT of cervical spine showed no fracture or acute bony abnormality.      Head Injury    Incident onset: 6/26/20. The injury mechanism was a fall. Length of episode of loss of consciousness: LOC, not sure how long. There was no blood loss. The quality of the pain is described as aching and dull ( burning and pressure on right side, where she hit ). The pain is moderate. Associated symptoms include headaches. Pertinent negatives include no blurred vision, disorientation, memory loss, numbness, tinnitus, vomiting or weakness. She has tried nothing for the symptoms.   Dizziness   This is a new problem. The current episode started yesterday. The problem occurs intermittently. The problem has been gradually improving. Associated symptoms include headaches, nausea, a sore throat (mild) and vertigo. Pertinent negatives include no abdominal pain, arthralgias, chest pain, chills, congestion, coughing, fatigue, fever, myalgias, neck pain, numbness, swollen glands, visual change, vomiting or weakness. Exacerbated by: turning over in bed. Treatments tried: Tried some head maneuvers. The treatment provided moderate relief.        The following portions of the patient's history were reviewed and updated as appropriate: allergies, current medications, past family history, past medical history, past social history, past surgical  history and problem list.      Review of Systems   Constitutional: Negative for chills, fatigue and fever.   HENT: Positive for ear pain (right ) and sore throat (mild). Negative for congestion, ear discharge, rhinorrhea, sinus pressure, sinus pain and tinnitus.    Eyes: Negative for blurred vision and pain.   Respiratory: Negative for cough and shortness of breath.    Cardiovascular: Negative for chest pain.   Gastrointestinal: Positive for nausea. Negative for abdominal pain and vomiting.   Musculoskeletal: Positive for gait problem (off balance). Negative for arthralgias, myalgias, neck pain and neck stiffness.   Neurological: Positive for dizziness (vertigo), vertigo and headaches. Negative for tremors, seizures, syncope, speech difficulty, weakness, light-headedness and numbness.   Psychiatric/Behavioral: Negative for memory loss.           Objective     Blood pressure 90/64, pulse 72, temperature 98 °F (36.7 °C), temperature source Temporal, resp. rate 20, weight 77.2 kg (170 lb 4 oz), last menstrual period 06/17/2020, not currently breastfeeding.    Physical Exam   Constitutional: She is oriented to person, place, and time.   Obese.   HENT:   Right Ear: Tympanic membrane, external ear and ear canal normal.   Left Ear: Tympanic membrane, external ear and ear canal normal.   Mouth/Throat: Oropharynx is clear and moist.   Eyes: Pupils are equal, round, and reactive to light. EOM are normal.   Neck: Normal range of motion. Neck supple. Carotid bruit is not present.   Cardiovascular: Normal rate, regular rhythm, normal heart sounds and intact distal pulses.   No murmur heard.  Pulmonary/Chest: Effort normal and breath sounds normal.   Musculoskeletal: Normal range of motion. She exhibits no edema.   Neurological: She is alert and oriented to person, place, and time. She has normal strength and normal reflexes. No cranial nerve deficit. She displays a negative Romberg sign. Coordination and gait normal.   The  patient is able to do finger to nose, heel to shin, and rapid alternating movements without difficulty.  The patient is able to heel, toe, and tandem walk appropriately.   Skin: No rash noted.   Psychiatric: She has a normal mood and affect.   Nursing note and vitals reviewed.        Assessment/Plan   Martina was seen today for head injury and dizziness.    Diagnoses and all orders for this visit:    Concussion with loss of consciousness of 30 minutes or less, subsequent encounter   Monitor.   Discussed often prolonged symptoms of concussion.    Vertigo  -     meclizine (ANTIVERT) 25 MG tablet; Take 1 tablet by mouth 3 (Three) Times a Day As Needed for Dizziness.   The patient agrees to call if her vertigo persists for 2-3 more days. May need PT.  Plenty of fluids.    PCOS (polycystic ovarian syndrome)  -     metFORMIN ER (GLUCOPHAGE-XR) 500 MG 24 hr tablet; Take 2 tablets by mouth Every Night (refill).        Return if symptoms worsen or fail to improve.

## 2020-08-26 DIAGNOSIS — F41.9 ANXIETY: ICD-10-CM

## 2020-08-26 RX ORDER — HYDROXYZINE HYDROCHLORIDE 25 MG/1
TABLET, FILM COATED ORAL
Qty: 30 TABLET | Refills: 0 | Status: SHIPPED | OUTPATIENT
Start: 2020-08-26 | End: 2020-10-15 | Stop reason: SDUPTHER

## 2020-09-08 ENCOUNTER — TELEPHONE (OUTPATIENT)
Dept: INTERNAL MEDICINE | Facility: CLINIC | Age: 30
End: 2020-09-08

## 2020-09-08 NOTE — TELEPHONE ENCOUNTER
Pt said she has a stye in her eye and canker sore on the inside of her mouth.  She said both are very sore and is wanting to see if something can be called in for her.  Pt is requesting a call back to discuss options.

## 2020-09-08 NOTE — TELEPHONE ENCOUNTER
Recommend warm compresses for the stye, 20 minutes 4-5 times per day.  Recommend she mix Mylanta and Benadryl suspension 1:1, and apply to the canker sore with a Q-tip 4-5 times per day.

## 2020-10-15 ENCOUNTER — OFFICE VISIT (OUTPATIENT)
Dept: INTERNAL MEDICINE | Facility: CLINIC | Age: 30
End: 2020-10-15

## 2020-10-15 VITALS
BODY MASS INDEX: 32.01 KG/M2 | SYSTOLIC BLOOD PRESSURE: 100 MMHG | RESPIRATION RATE: 20 BRPM | DIASTOLIC BLOOD PRESSURE: 70 MMHG | HEART RATE: 72 BPM | TEMPERATURE: 97.8 F | WEIGHT: 175 LBS

## 2020-10-15 DIAGNOSIS — H65.192 OTHER ACUTE NONSUPPURATIVE OTITIS MEDIA OF LEFT EAR, RECURRENCE NOT SPECIFIED: Primary | ICD-10-CM

## 2020-10-15 DIAGNOSIS — F41.1 GENERALIZED ANXIETY DISORDER: ICD-10-CM

## 2020-10-15 DIAGNOSIS — R42 VERTIGO: ICD-10-CM

## 2020-10-15 PROCEDURE — 99214 OFFICE O/P EST MOD 30 MIN: CPT | Performed by: INTERNAL MEDICINE

## 2020-10-15 RX ORDER — HYDROXYZINE HYDROCHLORIDE 25 MG/1
25 TABLET, FILM COATED ORAL DAILY PRN
Qty: 30 TABLET | Refills: 5 | Status: SHIPPED | OUTPATIENT
Start: 2020-10-15 | End: 2021-07-23

## 2020-10-15 RX ORDER — MECLIZINE HYDROCHLORIDE 25 MG/1
25 TABLET ORAL 3 TIMES DAILY PRN
Qty: 30 TABLET | Refills: 0 | OUTPATIENT
Start: 2020-10-15 | End: 2022-04-07

## 2020-10-15 RX ORDER — FLUCONAZOLE 150 MG/1
150 TABLET ORAL ONCE
Qty: 2 TABLET | Refills: 0 | Status: SHIPPED | OUTPATIENT
Start: 2020-10-15 | End: 2020-10-15

## 2020-10-15 RX ORDER — AMOXICILLIN 875 MG/1
875 TABLET, COATED ORAL 2 TIMES DAILY
Qty: 20 TABLET | Refills: 0 | Status: SHIPPED | OUTPATIENT
Start: 2020-10-15 | End: 2020-10-25

## 2020-10-15 NOTE — PROGRESS NOTES
Subjective       Martina Hopkins is a 30 y.o. female.     Chief Complaint   Patient presents with   • Earache   • Dizziness       History obtained from the patient.      Earache   There is pain in the right ear. This is a new problem. Episode onset: 2 days ago. The problem occurs constantly. The problem has been gradually worsening. There has been no fever. The pain is mild (aching). Associated symptoms include rhinorrhea (clear) and a sore throat. Pertinent negatives include no abdominal pain, coughing, diarrhea, ear discharge, headaches, hearing loss, neck pain, rash or vomiting. Associated symptoms comments: Her vertigo has recurred.. Treatments tried: Takes Loratadine, Flonase, and Singulairt intermittently.  Vertigo improves with maneuvers. The treatment provided mild relief. There is no history of a chronic ear infection, hearing loss or a tympanostomy tube.        The following portions of the patient's history were reviewed and updated as appropriate: allergies, current medications, past family history, past medical history, past social history, past surgical history and problem list.      Review of Systems   Constitutional: Negative for chills, fatigue and fever.   HENT: Positive for congestion, ear pain, postnasal drip, rhinorrhea (clear), sinus pressure, sinus pain, sneezing and sore throat. Negative for ear discharge, hearing loss and voice change.    Eyes: Positive for discharge (watery). Negative for pain, redness and itching.   Respiratory: Negative for cough, shortness of breath and wheezing.    Cardiovascular: Negative for chest pain.   Gastrointestinal: Negative for abdominal pain, diarrhea, nausea and vomiting.   Musculoskeletal: Negative for arthralgias, myalgias, neck pain and neck stiffness.   Skin: Negative for rash.   Neurological: Positive for dizziness and light-headedness. Negative for headaches.   Hematological: Negative for adenopathy.           Objective     Blood pressure 100/70, pulse  72, temperature 97.8 °F (36.6 °C), temperature source Temporal, resp. rate 20, weight 79.4 kg (175 lb), not currently breastfeeding.    Physical Exam  Vitals signs and nursing note reviewed.   Constitutional:       Appearance: She is well-developed and normal weight.   HENT:      Head: Normocephalic and atraumatic.      Comments: No maxillary or frontal sinus tenderness to palpation.     Right Ear: Tympanic membrane, ear canal and external ear normal.      Left Ear: Tympanic membrane, ear canal and external ear normal.      Mouth/Throat:      Mouth: Mucous membranes are moist. No oral lesions.      Pharynx: Oropharynx is clear.      Comments: Tonsils normal.  Eyes:      Conjunctiva/sclera: Conjunctivae normal.   Neck:      Musculoskeletal: Normal range of motion and neck supple.   Cardiovascular:      Rate and Rhythm: Normal rate and regular rhythm.      Heart sounds: No murmur.   Pulmonary:      Effort: Pulmonary effort is normal.      Breath sounds: Normal breath sounds.   Lymphadenopathy:      Cervical: No cervical adenopathy.   Skin:     Findings: No rash.   Neurological:      Mental Status: She is alert.   Psychiatric:         Mood and Affect: Mood normal.           Assessment/Plan   Diagnoses and all orders for this visit:    1. Other acute nonsuppurative otitis media of left ear, recurrence not specified (Primary)  -     amoxicillin (AMOXIL) 875 MG tablet; Take 1 tablet by mouth 2 (Two) Times a Day for 10 days.  Dispense: 20 tablet; Refill: 0  -     fluconazole (Diflucan) 150 MG tablet; Take 1 tablet by mouth 1 (One) Time for 1 dose. May repeat after 4 days.  Dispense: 2 tablet; Refill: 0   Continue current medication, and plenty of fluids.    2. Vertigo  -     meclizine (ANTIVERT) 25 MG tablet; Take 1 tablet by mouth 3 (Three) Times a Day As Needed for Dizziness.  Dispense: 30 tablet; Refill: 0    3. Generalized anxiety disorder  -     hydrOXYzine (ATARAX) 25 MG tablet; Take 1 tablet by mouth Daily As Needed  for Anxiety.  Dispense: 30 tablet; Refill: 5- refill        Return for Annual physical, fasting.

## 2021-01-06 ENCOUNTER — TELEPHONE (OUTPATIENT)
Dept: INTERNAL MEDICINE | Facility: CLINIC | Age: 31
End: 2021-01-06

## 2021-01-06 DIAGNOSIS — R42 VERTIGO: Primary | ICD-10-CM

## 2021-01-06 NOTE — TELEPHONE ENCOUNTER
Patient is calling about a referral to an ENT in regards to their vertigo.  Patient would like to know if she needs to be seen in reference to this before a referral or no.  Patient would like a call back. Please advise      Martina Hopkins call back [phone]

## 2021-01-06 NOTE — TELEPHONE ENCOUNTER
Spoke with Martina , she states she had concussion and dizziness  Since 7/10/2020 when she saw Dr Ventura .  She would like a referral to ENT who takes her insurance

## 2021-07-17 DIAGNOSIS — F41.1 GENERALIZED ANXIETY DISORDER: ICD-10-CM

## 2021-07-19 NOTE — TELEPHONE ENCOUNTER
LOV for chronic condition   10/15/20  NOV none     HUBB notifiy patient and schedule   LMOVm  She needs appt she was last seen 7/10/2021

## 2021-07-23 RX ORDER — HYDROXYZINE HYDROCHLORIDE 25 MG/1
TABLET, FILM COATED ORAL
Qty: 30 TABLET | Refills: 1 | Status: SHIPPED | OUTPATIENT
Start: 2021-07-23 | End: 2021-08-12 | Stop reason: SDUPTHER

## 2021-08-12 DIAGNOSIS — F41.1 GENERALIZED ANXIETY DISORDER: ICD-10-CM

## 2021-08-12 NOTE — TELEPHONE ENCOUNTER
Caller: Martina Hopkins    Relationship: Self    Best call back number: [phone]    Medication needed:   Requested Prescriptions     Pending Prescriptions Disp Refills   • hydrOXYzine (ATARAX) 25 MG tablet 30 tablet 1     Sig: Take 1 tablet by mouth Daily As Needed for Anxiety.       When do you need the refill by: 08/12/2021    What additional details did the patient provide when requesting the medication:   PATIENT STATED THAT SHE HAS BEEN TAKING TWO PILLS A NIGHT INSTEAD OF ONE PILL A NIGHT DUE TO ONE PILL NOT HELPING.     PATIENT WOULD LIKE HER MEDICATION INCREASED    PATIENT IS COMPLETLEY OUT OF MEDICATION     Does the patient have less than a 3 day supply:  [x] Yes  [] No    What [place] the patient[place] [facility] [place] [address] [facility] [place] [phone] [facility] [phone] FX

## 2021-08-13 RX ORDER — HYDROXYZINE HYDROCHLORIDE 25 MG/1
25 TABLET, FILM COATED ORAL DAILY PRN
Qty: 30 TABLET | Refills: 1 | Status: SHIPPED | OUTPATIENT
Start: 2021-08-13 | End: 2021-10-01 | Stop reason: SDUPTHER

## 2021-08-13 NOTE — TELEPHONE ENCOUNTER
Prescription sent to the pharmacy.  The patient is due for a physical or follow-up, fasting.  Please schedule.

## 2021-08-25 ENCOUNTER — TELEPHONE (OUTPATIENT)
Dept: INTERNAL MEDICINE | Facility: CLINIC | Age: 31
End: 2021-08-25

## 2021-08-25 NOTE — TELEPHONE ENCOUNTER
Caller: Martina Hopkins    Relationship: Self    Best call back number: [phone]     What was the call regarding: PATIENT CALLED STATING THAT SHE IS HAVING EAR PAIN AND VERTIGO.  PATIENT ASKED FOR AN APPOINTMENT FOR TOMORROW MORNING.      Do you require a callback: YES

## 2021-08-25 NOTE — TELEPHONE ENCOUNTER
ANAYA to retrun call   Office # given     DARREL GRANGER for her to schedule in a same day appt for tomorrow

## 2021-08-30 NOTE — TELEPHONE ENCOUNTER
ANAYA for Martina to call back if she needed an appointment . Have left numerous messages but unable to reach her.  Notified her to  Call back if she still needs a appt.

## 2021-08-30 NOTE — TELEPHONE ENCOUNTER
LVM for the patient to return our call, office number was provided      HUB TO READ: If the patient returns our call please ask if she is still needing an appointment. If she is please help her in scheduling. Thank you!

## 2021-09-27 PROCEDURE — U0004 COV-19 TEST NON-CDC HGH THRU: HCPCS | Performed by: NURSE PRACTITIONER

## 2021-10-01 ENCOUNTER — TELEMEDICINE (OUTPATIENT)
Dept: INTERNAL MEDICINE | Facility: CLINIC | Age: 31
End: 2021-10-01

## 2021-10-01 VITALS — WEIGHT: 180 LBS | BODY MASS INDEX: 32.92 KG/M2

## 2021-10-01 DIAGNOSIS — J06.9 UPPER RESPIRATORY TRACT INFECTION, UNSPECIFIED TYPE: Primary | ICD-10-CM

## 2021-10-01 DIAGNOSIS — F41.1 GENERALIZED ANXIETY DISORDER: ICD-10-CM

## 2021-10-01 PROCEDURE — 99214 OFFICE O/P EST MOD 30 MIN: CPT | Performed by: INTERNAL MEDICINE

## 2021-10-01 RX ORDER — HYDROXYZINE HYDROCHLORIDE 25 MG/1
25 TABLET, FILM COATED ORAL DAILY PRN
Qty: 30 TABLET | Refills: 1 | Status: SHIPPED | OUTPATIENT
Start: 2021-10-01 | End: 2021-10-27 | Stop reason: SDUPTHER

## 2021-10-01 RX ORDER — METHYLPREDNISOLONE 4 MG/1
TABLET ORAL
Qty: 1 EACH | Refills: 0 | OUTPATIENT
Start: 2021-10-01 | End: 2022-04-07

## 2021-10-01 NOTE — PATIENT INSTRUCTIONS
Continue current over the counter medication, and plenty of fluids. Recommend Covid testing as already scheduled. If Covid test is negative, please call in 3 to 4 days if no better, and I will call in an antibiotic.

## 2021-10-01 NOTE — PROGRESS NOTES
Christiano Hopkins is a 31 y.o. female.     Chief Complaint   Patient presents with   • URI       History obtained from the patient.    The patient has chosen to receive care through a Telehealth visit.  The patient consented to use a video/audio connection for his/her medical care today.    The patient presents during the COVID-19 pandemic/federally declared [facility] public health emergency.  This service was conducted via Telehealth audio/visual.  Connected to the patient using [employer].  This visit occurred with the Provider located at [facility] (@ [place], [place]) and the patient located at home in the [facility].  Other participants in this Telehealth visit included:    The patient is requesting a Hydroxyzine refill.  She states she is in need to take 2 at bedtime to sleep.    The patient was seen at [facility] on 9/27/2021.  O2 sat was 99%.  Testing for COVID-19, and strep (including throat culture) were negative.  No medication was prescribed.    URI   This is a new problem. Episode onset: 4 days ago. The problem has been gradually improving. There has been no fever. Associated symptoms include congestion, coughing (deep dry, now productive of yellow sputum), rhinorrhea (yellow and gree), sneezing, a sore throat, swollen glands and vomiting (post tussive). Pertinent negatives include no abdominal pain, chest pain, diarrhea, ear pain, headaches, joint pain, joint swelling, nausea, neck pain, plugged ear sensation, rash, sinus pain or wheezing. Associated symptoms comments: No loss of taste or smell.  . Treatments tried: Mucinex, Dayquil, Theraflu, Tylenoland Vicks Vapor Rub. The treatment provided mild relief.     The patient denies any known COVID-19, strep, or mono exposure.  She has been exposed to someone with a URI.  She states she has a COVID-19 test scheduled for tomorrow.    The following portions of the patient's history were reviewed  and updated as appropriate: allergies, current medications, past family history, past medical history, past social history, past surgical history and problem list.      Review of Systems   Constitutional: Positive for fatigue. Negative for appetite change, chills and fever.   HENT: Positive for congestion, postnasal drip, rhinorrhea (yellow and gree), sinus pressure, sneezing, sore throat and voice change (hoarse). Negative for ear pain and sinus pain.    Eyes: Negative for pain, discharge, redness and itching.   Respiratory: Positive for cough (deep dry, now productive of yellow sputum) and chest tightness (burning). Negative for shortness of breath and wheezing.    Cardiovascular: Negative for chest pain.   Gastrointestinal: Positive for vomiting (post tussive). Negative for abdominal pain, diarrhea and nausea.   Musculoskeletal: Positive for myalgias. Negative for arthralgias, joint pain, joint swelling, neck pain and neck stiffness.   Skin: Negative for rash.   Neurological: Negative for headaches.        Has chronic vertigo, slightly worse   Hematological: Positive for adenopathy.   Psychiatric/Behavioral: Positive for sleep disturbance. The patient is nervous/anxious.            Objective     Weight 81.6 kg (180 lb), not currently breastfeeding.    Physical Exam  Vitals reviewed.   Constitutional:       Appearance: She is obese.   HENT:      Mouth/Throat:      Mouth: Mucous membranes are moist.      Pharynx: No oropharyngeal exudate or posterior oropharyngeal erythema.   Pulmonary:      Effort: Pulmonary effort is normal.      Breath sounds: Normal breath sounds.   Neurological:      Mental Status: She is alert.   Psychiatric:         Mood and Affect: Mood normal.           Assessment/Plan   Diagnoses and all orders for this visit:    1. Upper respiratory tract infection, unspecified type (Primary)  -     methylPREDNISolone (MEDROL) 4 MG dose pack; Take as directed on package instructions.  Dispense: 1 each;  Refill: 0   Continue current over the counter medication, and plenty of fluids.   Recommend Covid testing as already scheduled.   If Covid test is negative, the patient was asked to call in 3 to 4 days if no better, and I will call in an antibiotic.    2. Generalized anxiety disorder  -     hydrOXYzine (ATARAX) 25 MG tablet; Take 1 tablet by mouth Daily As Needed for Anxiety.  Dispense: 30 tablet; Refill: 1- REFILL          Return in about 2 weeks (around 10/15/2021) for Annual physical, fasting.

## 2021-10-27 DIAGNOSIS — F41.1 GENERALIZED ANXIETY DISORDER: ICD-10-CM

## 2021-10-27 RX ORDER — HYDROXYZINE HYDROCHLORIDE 25 MG/1
25 TABLET, FILM COATED ORAL 2 TIMES DAILY PRN
Qty: 60 TABLET | Refills: 5 | Status: SHIPPED | OUTPATIENT
Start: 2021-10-27 | End: 2022-05-12 | Stop reason: SDUPTHER

## 2021-10-27 RX ORDER — HYDROXYZINE HYDROCHLORIDE 25 MG/1
25 TABLET, FILM COATED ORAL DAILY PRN
Qty: 30 TABLET | Refills: 3 | Status: SHIPPED | OUTPATIENT
Start: 2021-10-27 | End: 2021-10-27 | Stop reason: SDUPTHER

## 2021-10-27 NOTE — TELEPHONE ENCOUNTER
Caller: Martina Hopkins    Relationship: Self      Medication requested (name and dosage): hydrOXYzine (ATARAX) 25 MG tablet    Requested Prescriptions:   Requested Prescriptions     Pending Prescriptions Disp Refills   • hydrOXYzine (ATARAX) 25 MG tablet 30 tablet 1     Sig: Take 1 tablet by mouth Daily As Needed for Anxiety.        Pharmacy where request should be sent: [facility] [phone] [facility] [phone]   [phone]     Additional details provided by patient: DR. BALLARD KNOWS THAT PATIENT HAS BEEN TAKING 2 TABLETS PER DAY WHEN HER ANXIETY IS EXTREMELY HIGH SO SHE RAN OUT FASTER AND THE PHARMACY NEEDS A NEW PRESCRIPTION IN ORDER TO FILL IT EARLY. PLEASE ADVISE AND CALL PATIENT      Best call back number: [phone]      Does the patient have less than a 3 day supply:  [x] Yes  [] No    Violette Orantes Rep   10/27/21 11:51 EDT

## 2022-05-12 DIAGNOSIS — F41.1 GENERALIZED ANXIETY DISORDER: ICD-10-CM

## 2022-05-12 RX ORDER — HYDROXYZINE HYDROCHLORIDE 25 MG/1
25 TABLET, FILM COATED ORAL 2 TIMES DAILY PRN
Qty: 60 TABLET | Refills: 5 | Status: SHIPPED | OUTPATIENT
Start: 2022-05-12

## 2022-05-12 RX ORDER — LORATADINE 10 MG/1
10 TABLET ORAL DAILY
Qty: 30 TABLET | Refills: 1 | Status: SHIPPED | OUTPATIENT
Start: 2022-05-12 | End: 2022-12-02

## 2022-05-12 NOTE — TELEPHONE ENCOUNTER
Caller: Martina Hopkins    Relationship: Self    Best call back number: [phone]    Requested Prescriptions:   Requested Prescriptions     Pending Prescriptions Disp Refills   • loratadine (Claritin) 10 MG tablet 30 tablet 1     Sig: Take 1 tablet by mouth Daily.   • hydrOXYzine (ATARAX) 25 MG tablet 60 tablet 5     Sig: Take 1 tablet by mouth 2 (Two) Times a Day As Needed for Anxiety.        Pharmacy where request should be sent: [facility] - [phone] [facility] [phone] FX     Additional details provided by patient: PATIENT STATES THAT SHE IS OUT OF MEDICATION     Does the patient have less than a 3 day supply:  [x] Yes  [] No    Violette Armando Rep   05/12/22 10:30 EDT

## 2022-06-16 ENCOUNTER — OFFICE VISIT (OUTPATIENT)
Dept: INTERNAL MEDICINE | Facility: CLINIC | Age: 32
End: 2022-06-16

## 2022-06-16 VITALS
SYSTOLIC BLOOD PRESSURE: 102 MMHG | HEIGHT: 61 IN | WEIGHT: 182.13 LBS | DIASTOLIC BLOOD PRESSURE: 62 MMHG | BODY MASS INDEX: 34.39 KG/M2 | HEART RATE: 72 BPM | TEMPERATURE: 97.5 F | RESPIRATION RATE: 20 BRPM

## 2022-06-16 DIAGNOSIS — B36.0 TINEA VERSICOLOR: ICD-10-CM

## 2022-06-16 DIAGNOSIS — M25.531 RIGHT WRIST PAIN: ICD-10-CM

## 2022-06-16 DIAGNOSIS — Z00.00 ENCOUNTER FOR HEALTH MAINTENANCE EXAMINATION IN ADULT: Primary | ICD-10-CM

## 2022-06-16 DIAGNOSIS — F41.1 GENERALIZED ANXIETY DISORDER: ICD-10-CM

## 2022-06-16 DIAGNOSIS — R42 VERTIGO: ICD-10-CM

## 2022-06-16 DIAGNOSIS — R11.11 VOMITING WITHOUT NAUSEA, UNSPECIFIED VOMITING TYPE: ICD-10-CM

## 2022-06-16 DIAGNOSIS — Z13.6 SCREENING FOR CARDIOVASCULAR CONDITION: ICD-10-CM

## 2022-06-16 DIAGNOSIS — E55.9 VITAMIN D DEFICIENCY: ICD-10-CM

## 2022-06-16 DIAGNOSIS — E28.2 PCOS (POLYCYSTIC OVARIAN SYNDROME): ICD-10-CM

## 2022-06-16 PROBLEM — K59.00 CONSTIPATION: Status: RESOLVED | Noted: 2017-04-12 | Resolved: 2022-06-16

## 2022-06-16 PROCEDURE — 99213 OFFICE O/P EST LOW 20 MIN: CPT | Performed by: INTERNAL MEDICINE

## 2022-06-16 PROCEDURE — 99395 PREV VISIT EST AGE 18-39: CPT | Performed by: INTERNAL MEDICINE

## 2022-06-16 PROCEDURE — 3008F BODY MASS INDEX DOCD: CPT | Performed by: INTERNAL MEDICINE

## 2022-06-16 PROCEDURE — 2014F MENTAL STATUS ASSESS: CPT | Performed by: INTERNAL MEDICINE

## 2022-06-16 RX ORDER — MELOXICAM 15 MG/1
15 TABLET ORAL DAILY PRN
Qty: 30 TABLET | Refills: 0 | Status: SHIPPED | OUTPATIENT
Start: 2022-06-16 | End: 2022-08-24

## 2022-06-16 RX ORDER — KETOCONAZOLE 20 MG/G
1 CREAM TOPICAL DAILY
Qty: 60 G | Refills: 0 | Status: SHIPPED | OUTPATIENT
Start: 2022-06-16 | End: 2022-06-30

## 2022-06-16 NOTE — PATIENT INSTRUCTIONS
Please return for fasting labs.    For the right wrist pain, recommend you take the NSAIDS (Meloxicam) for a 2 full weeks, then as needed.  Recommend ice, 2-3 times daily.  Please call if no better in 2 weeks, and we will do an x-ray..      Health Maintenance, Female  Adopting a healthy lifestyle and getting preventive care are important in promoting health and wellness. Ask your health care provider about:  The right schedule for you to have regular tests and exams.  Things you can do on your own to prevent diseases and keep yourself healthy.  What should I know about diet, weight, and exercise?  Eat a healthy diet    Eat a diet that includes plenty of vegetables, fruits, low-fat dairy products, and lean protein.  Do not eat a lot of foods that are high in solid fats, added sugars, or sodium.    Maintain a healthy weight  Body mass index (BMI) is used to identify weight problems. It estimates body fat based on height and weight. Your health care provider can help determine your BMI and help you achieve or maintain a healthy weight.  Get regular exercise  Get regular exercise. This is one of the most important things you can do for your health. Most adults should:  Exercise for at least 150 minutes each week. The exercise should increase your heart rate and make you sweat (moderate-intensity exercise).  Do strengthening exercises at least twice a week. This is in addition to the moderate-intensity exercise.  Spend less time sitting. Even light physical activity can be beneficial.  Watch cholesterol and blood lipids  Have your blood tested for lipids and cholesterol at 20 years of age, then have this test every 5 years.  Have your cholesterol levels checked more often if:  Your lipid or cholesterol levels are high.  You are older than 40 years of age.  You are at high risk for heart disease.  What should I know about cancer screening?  Depending on your health history and family history, you may need to have cancer  screening at various ages. This may include screening for:  Breast cancer.  Cervical cancer.  Colorectal cancer.  Skin cancer.  Lung cancer.  What should I know about heart disease, diabetes, and high blood pressure?  Blood pressure and heart disease  High blood pressure causes heart disease and increases the risk of stroke. This is more likely to develop in people who have high blood pressure readings, are of  descent, or are overweight.  Have your blood pressure checked:  Every 3-5 years if you are 18-39 years of age.  Every year if you are 40 years old or older.  Diabetes  Have regular diabetes screenings. This checks your fasting blood sugar level. Have the screening done:  Once every three years after age 40 if you are at a normal weight and have a low risk for diabetes.  More often and at a younger age if you are overweight or have a high risk for diabetes.  What should I know about preventing infection?  Hepatitis B  If you have a higher risk for hepatitis B, you should be screened for this virus. Talk with your health care provider to find out if you are at risk for hepatitis B infection.  Hepatitis C  Testing is recommended for:  Everyone born from 1945 through 1965.  Anyone with known risk factors for hepatitis C.  Sexually transmitted infections (STIs)  Get screened for STIs, including gonorrhea and chlamydia, if:  You are sexually active and are younger than 24 years of age.  You are older than 24 years of age and your health care provider tells you that you are at risk for this type of infection.  Your sexual activity has changed since you were last screened, and you are at increased risk for chlamydia or gonorrhea. Ask your health care provider if you are at risk.  Ask your health care provider about whether you are at high risk for HIV. Your health care provider may recommend a prescription medicine to help prevent HIV infection. If you choose to take medicine to prevent HIV, you should first  get tested for HIV. You should then be tested every 3 months for as long as you are taking the medicine.  Pregnancy  If you are about to stop having your period (premenopausal) and you may become pregnant, seek counseling before you get pregnant.  Take 400 to 800 micrograms (mcg) of folic acid every day if you become pregnant.  Ask for birth control (contraception) if you want to prevent pregnancy.  Osteoporosis and menopause  Osteoporosis is a disease in which the bones lose minerals and strength with aging. This can result in bone fractures. If you are 65 years old or older, or if you are at risk for osteoporosis and fractures, ask your health care provider if you should:  Be screened for bone loss.  Take a calcium or vitamin D supplement to lower your risk of fractures.  Be given hormone replacement therapy (HRT) to treat symptoms of menopause.  Follow these instructions at home:  Lifestyle  Do not use any products that contain nicotine or tobacco, such as cigarettes, e-cigarettes, and chewing tobacco. If you need help quitting, ask your health care provider.  Do not use street drugs.  Do not share needles.  Ask your health care provider for help if you need support or information about quitting drugs.  Alcohol use  Do not drink alcohol if:  Your health care provider tells you not to drink.  You are pregnant, may be pregnant, or are planning to become pregnant.  If you drink alcohol:  Limit how much you use to 0-1 drink a day.  Limit intake if you are breastfeeding.  Be aware of how much alcohol is in your drink. In the [place] one drink equals one 12 oz bottle of beer (355 mL), one 5 oz glass of wine (148 mL), or one 1½ oz glass of hard liquor (44 mL).  General instructions  Schedule regular health, dental, and eye exams.  Stay current with your vaccines.  Tell your health care provider if:  You often feel depressed.  You have ever been abused or do not feel safe at home.  Summary  Adopting a healthy lifestyle and  getting preventive care are important in promoting health and wellness.  Follow your health care provider's instructions about healthy diet, exercising, and getting tested or screened for diseases.  Follow your health care provider's instructions on monitoring your cholesterol and blood pressure.  This information is not intended to replace advice given to you by your health care provider. Make sure you discuss any questions you have with your health care provider.  Document Revised: 12/11/2019 Document Reviewed: 12/11/2019  [employer] Patient Education © 2021 [employer]  MyPlate from [employer]    MyPlate is an outline of a general healthy diet based on the 2010 Dietary Guidelines for Americans, from the [place] Department of Agriculture (USDA). It sets guidelines for how To follow MyPlate recommendations:  Eat a wide variety of fruits and vegetables, grains, and protein foods.  Serve smaller portions and eat less food throughout the day.  Limit portion sizes to avoid overeating.  Enjoy your food.  Get at least 150 minutes of exercise every week. This is about 30 minutes each day, 5 or more days per week.  It can be difficult to have every meal look like MyPlate. Think about MyPlate as eating guidelines for an entire day, rather than each individual meal.  Fruits and vegetables  Make half of your plate fruits and vegetables.  Eat many different colors of fruits and vegetables each day.  For a 2,000 calorie daily food plan, eat:  2½ cups of vegetables every day.  2 cups of fruit every day.  1 cup is equal to:  1 cup raw or cooked vegetables.  1 cup raw fruit.  1 medium-sized orange, apple, or banana.  1 cup 100% fruit or vegetable juice.  2 cups raw leafy greens, such as lettuce, spinach, or kale.  ½ cup dried fruit.  Grains  One fourth of your plate should be grains.  Make at least half of the grains you eat each day whole grains.  For a 2,000 calorie daily food plan, eat 6 oz of grains every day.  1 oz is equal to:  1  slice bread.  1 cup cereal.  ½ cup cooked rice, cereal, or pasta.  Protein  One fourth of your plate should be protein.  Eat a wide variety of protein foods, including meat, poultry, fish, eggs, beans, nuts, and tofu.  For a 2,000 calorie daily food plan, eat 5½ oz of protein every day.  1 oz is equal to:  1 oz meat, poultry, or fish.  ¼ cup cooked beans.  1 egg.  ½ oz nuts or seeds.  1 Tbsp peanut butter.  Dairy  Drink fat-free or low-fat (1%) milk.  Eat or drink dairy as a side to meals.  For a 2,000 calorie daily food plan, eat or drink 3 cups of dairy every day.  1 cup is equal to:  1 cup milk, yogurt, cottage cheese, or soy milk (soy beverage).  2 oz processed cheese.  1½ oz natural cheese.  Fats, oils, salt, and sugars  Only small amounts of oils are recommended.  Avoid foods that are high in calories and low in nutritional value (empty calories), like foods high in fat or added sugars.  Choose foods that are low in salt (sodium). Choose foods that have less than 140 milligrams (mg) of sodium per serving.  Drink water instead of sugary drinks. Drink enough water each day to keep your urine pale yellow.  Where to find support  Work with your health care provider or a nutrition specialist (dietitian) to develop a customized eating plan that is right for you.  Download an jacinta (mobile application) to help you track your daily food intake.  Where to find more information  Go to ChooseMyPlate.gov for more information.  Summary  MyPlate is a general guideline for healthy eating from the USDA. It is based on the 2010 Dietary Guidelines for Americans.  In general, fruits and vegetables should take up ½ of your plate, grains should take up ¼ of your plate, and protein should take up ¼ of your plate.  This information is not intended to replace advice given to you by your health care provider. Make sure you discuss any questions you have with your health care provider.  Document Revised: 05/21/2020 Document Reviewed:  03/19/2018  Elsevier Patient Education © 2021 [employer]  Calorie Counting for Weight Loss  Calories are units of energy. Your body needs a certain number of calories from food to keep going throughout the day. When you eat or drink more calories than your body needs, your body stores the extra calories mostly as fat. When you eat or drink fewer calories than your body needs, your body burns fat to get the energy it needs.  Calorie counting means keeping track of how many calories you eat and drink each day. Calorie counting can be helpful if you need to lose weight. If you eat fewer calories than your body needs, you should lose weight. Ask your health care provider what a healthy weight is for you.  For calorie counting to work, you will need to eat the right number of calories each day to lose a healthy amount of weight per week. A dietitian can help you figure out how many calories you need in a day and will suggest ways to reach your calorie goal.  A healthy amount of weight to lose each week is usually 1-2 lb (0.5-0.9 kg). This usually means that your daily calorie intake should be reduced by 500-750 calories.  Eating 1,200-1,500 calories a day can help most women lose weight.  Eating 1,500-1,800 calories a day can help most men lose weight.  What do I need to know about calorie counting?  Work with your health care provider or dietitian to determine how many calories you should get each day. To meet your daily calorie goal, you will need to:  Find out how many calories are in each food that you would like to eat. Try to do this before you eat.  Decide how much of the food you plan to eat.  Keep a food log. Do this by writing down what you ate and how many calories it had.  To successfully lose weight, it is important to balance calorie counting with a healthy lifestyle that includes regular activity.  Where do I find calorie information?    The number of calories in a food can be found on a Nutrition Facts  label. If a food does not have a Nutrition Facts label, try to look up the calories online or ask your dietitian for help.  Remember that calories are listed per serving. If you choose to have more than one serving of a food, you will have to multiply the calories per serving by the number of servings you plan to eat. For example, the label on a package of bread might say that a serving size is 1 slice and that there are 90 calories in a serving. If you eat 1 slice, you will have eaten 90 calories. If you eat 2 slices, you will have eaten 180 calories.  How do I keep a food log?  After each time that you eat, record the following in your food log as soon as possible:  What you ate. Be sure to include toppings, sauces, and other extras on the food.  How much you ate. This can be measured in cups, ounces, or number of items.  How many calories were in each food and drink.  The total number of calories in the food you ate.  Keep your food log near you, such as in a pocket-sized notebook or on an jacinta or website on your mobile phone. Some programs will calculate calories for you and show you how many calories you have left to meet your daily goal.  What are some portion-control tips?  Know how many calories are in a serving. This will help you know how many servings you can have of a certain food.  Use a measuring cup to measure serving sizes. You could also try weighing out portions on a kitchen scale. With time, you will be able to estimate serving sizes for some foods.  Take time to put servings of different foods on your favorite plates or in your favorite bowls and cups so you know what a serving looks like.  Try not to eat straight from a food's packaging, such as from a bag or box. Eating straight from the package makes it hard to see how much you are eating and can lead to overeating. Put the amount you would like to eat in a cup or on a plate to make sure you are eating the right portion.  Use smaller plates,  glasses, and bowls for smaller portions and to prevent overeating.  Try not to multitask. For example, avoid watching TV or using your computer while eating. If it is time to eat, sit down at a table and enjoy your food. This will help you recognize when you are full. It will also help you be more mindful of what and how much you are eating.  What are tips for following this plan?  Reading food labels  Check the calorie count compared with the serving size. The serving size may be smaller than what you are used to eating.  Check the source of the calories. Try to choose foods that are high in protein, fiber, and vitamins, and low in saturated fat, trans fat, and sodium.  Shopping  Read nutrition labels while you shop. This will help you make healthy decisions about which foods to buy.  Pay attention to nutrition labels for low-fat or fat-free foods. These foods sometimes have the same number of calories or more calories than the full-fat versions. They also often have added sugar, starch, or salt to make up for flavor that was removed with the fat.  Make a grocery list of lower-calorie foods and stick to it.  Cooking  Try to cook your favorite foods in a healthier way. For example, try baking instead of frying.  Use low-fat dairy products.  Meal planning  Use more fruits and vegetables. One-half of your plate should be fruits and vegetables.  Include lean proteins, such as chicken, turkey, and fish.  Lifestyle  Each week, aim to do one of the followin minutes of moderate exercise, such as walking.  75 minutes of vigorous exercise, such as running.  General information  Know how many calories are in the foods you eat most often. This will help you calculate calorie counts faster.  Find a way of tracking calories that works for you. Get creative. Try different apps or programs if writing down calories does not work for you.  What foods should I eat?    Eat nutritious foods. It is better to have a nutritious,  high-calorie food, such as an avocado, than a food with few nutrients, such as a bag of potato chips.  Use your calories on foods and drinks that will fill you up and will not leave you hungry soon after eating.  Examples of foods that fill you up are nuts and nut butters, vegetables, lean proteins, and high-fiber foods such as whole grains. High-fiber foods are foods with more than 5 g of fiber per serving.  Pay attention to calories in drinks. Low-calorie drinks include water and unsweetened drinks.  The items listed above may not be a complete list of foods and beverages you can eat. Contact a dietitian for more information.  What foods should I limit?  Limit foods or drinks that are not good sources of vitamins, minerals, or protein or that are high in unhealthy fats. These include:  Candy.  Other sweets.  Sodas, specialty coffee drinks, alcohol, and juice.  The items listed above may not be a complete list of foods and beverages you should avoid. Contact a dietitian for more information.  How do I count calories when eating out?  Pay attention to portions. Often, portions are much larger when eating out. Try these tips to keep portions smaller:  Consider sharing a meal instead of getting your own.  If you get your own meal, eat only half of it. Before you start eating, ask for a container and put half of your meal into it.  When available, consider ordering smaller portions from the menu instead of full portions.  Pay attention to your food and drink choices. Knowing the way food is cooked and what is included with the meal can help you eat fewer calories.  If calories are listed on the menu, choose the lower-calorie options.  Choose dishes that include vegetables, fruits, whole grains, low-fat dairy products, and lean proteins.  Choose items that are boiled, broiled, grilled, or steamed. Avoid items that are buttered, battered, fried, or served with cream sauce. Items labeled as crispy are usually fried,  unless stated otherwise.  Choose water, low-fat milk, unsweetened iced tea, or other drinks without added sugar. If you want an alcoholic beverage, choose a lower-calorie option, such as a glass of wine or light beer.  Ask for dressings, sauces, and syrups on the side. These are usually high in calories, so you should limit the amount you eat.  If you want a salad, choose a garden salad and ask for grilled meats. Avoid extra toppings such as christie, cheese, or fried items. Ask for the dressing on the side, or ask for olive oil and vinegar or lemon to use as dressing.  Estimate how many servings of a food you are given. Knowing serving sizes will help you be aware of how much food you are eating at restaurants.  Where to find more information  Centers for Disease Control and Prevention: www.cdc.gov  [place] Department of Agriculture: [employer].gov  Summary  Calorie counting means keeping track of how many calories you eat and drink each day. If you eat fewer calories than your body needs, you should lose weight.  A healthy amount of weight to lose per week is usually 1-2 lb (0.5-0.9 kg). This usually means reducing your daily calorie intake by 500-750 calories.  The number of calories in a food can be found on a Nutrition Facts label. If a food does not have a Nutrition Facts label, try to look up the calories online or ask your dietitian for help.  Use smaller plates, glasses, and bowls for smaller portions and to prevent overeating.  Use your calories on foods and drinks that will fill you up and not leave you hungry shortly after a meal.  This information is not intended to replace advice given to you by your health care provider. Make sure you discuss any questions you have with your health care provider.  Document Revised: 01/28/2021 Document Reviewed: 01/28/2021  Elsevier Patient Education © 2021 [employer]  Exercising to Lose Weight  Exercise is structured, repetitive physical activity to improve fitness and  health. Getting regular exercise is important for everyone. It is especially important if you are overweight. Being overweight increases your risk of heart disease, stroke, diabetes, high blood pressure, and several types of cancer. Reducing your calorie intake and exercising can help you lose weight.  Exercise is usually categorized as moderate or vigorous intensity. To lose weight, most people need to do a certain amount of moderate-intensity or vigorous-intensity exercise each week.  Moderate-intensity exercise    Moderate-intensity exercise is any activity that gets you moving enough to burn at least three times more energy (calories) than if you were sitting.  Examples of moderate exercise include:  Walking a mile in 15 minutes.  Doing light yard work.  Biking at an easy pace.  Most people should get at least 150 minutes (2 hours and 30 minutes) a week of moderate-intensity exercise to maintain their body weight.  Vigorous-intensity exercise  Vigorous-intensity exercise is any activity that gets you moving enough to burn at least six times more calories than if you were sitting. When you exercise at this intensity, you should be working hard enough that you are not able to carry on a conversation.  Examples of vigorous exercise include:  Running.  Playing a team sport, such as football, basketball, and soccer.  Jumping rope.  Most people should get at least 75 minutes (1 hour and 15 minutes) a week of vigorous-intensity exercise to maintain their body weight.  How can exercise affect me?  When you exercise enough to burn more calories than you eat, you lose weight. Exercise also reduces body fat and builds muscle. The more muscle you have, the more calories you burn. Exercise also:  Improves mood.  Reduces stress and tension.  Improves your overall fitness, flexibility, and endurance.  Increases bone strength.  The amount of exercise you need to lose weight depends on:  Your age.  The type of exercise.  Any  health conditions you have.  Your overall physical ability.  Talk to your health care provider about how much exercise you need and what types of activities are safe for you.  What actions can I take to lose weight?  Nutrition    Make changes to your diet as told by your health care provider or diet and nutrition specialist (dietitian). This may include:  Eating fewer calories.  Eating more protein.  Eating less unhealthy fats.  Eating a diet that includes fresh fruits and vegetables, whole grains, low-fat dairy products, and lean protein.  Avoiding foods with added fat, salt, and sugar.  Drink plenty of water while you exercise to prevent dehydration or heat stroke.    Activity  Choose an activity that you enjoy and set realistic goals. Your health care provider can help you make an exercise plan that works for you.  Exercise at a moderate or vigorous intensity most days of the week.  The intensity of exercise may vary from person to person. You can tell how intense a workout is for you by paying attention to your breathing and heartbeat. Most people will notice their breathing and heartbeat get faster with more intense exercise.  Do resistance training twice each week, such as:  Push-ups.  Sit-ups.  Lifting weights.  Using resistance bands.  Getting short amounts of exercise can be just as helpful as long structured periods of exercise. If you have trouble finding time to exercise, try to include exercise in your daily routine.  Get up, stretch, and walk around every 30 minutes throughout the day.  Go for a walk during your lunch break.  Park your car farther away from your destination.  If you take public transportation, get off one stop early and walk the rest of the way.  Make phone calls while standing up and walking around.  Take the stairs instead of elevators or escalators.  Wear comfortable clothes and shoes with good support.  Do not exercise so much that you hurt yourself, feel dizzy, or get very short  of breath.  Where to find more information  [place] Department of Health and Human Services: www.hhs.gov  Centers for Disease Control and Prevention (CDC): www.cdc.gov  Contact a health care provider:  Before starting a new exercise program.  If you have questions or concerns about your weight.  If you have a medical problem that keeps you from exercising.  Get help right away if you have any of the following while exercising:  Injury.  Dizziness.  Difficulty breathing or shortness of breath that does not go away when you stop exercising.  Chest pain.  Rapid heartbeat.  Summary  Being overweight increases your risk of heart disease, stroke, diabetes, high blood pressure, and several types of cancer.  Losing weight happens when you burn more calories than you eat.  Reducing the amount of calories you eat in addition to getting regular moderate or vigorous exercise each week helps you lose weight.  This information is not intended to replace advice given to you by your health care provider. Make sure you discuss any questions you have with your health care provider.  Document Revised: 04/15/2021 Document Reviewed: 04/15/2021  Elsevier Patient Education © 2021 [employer] Inc[employer]

## 2022-06-16 NOTE — PROGRESS NOTES
Subjective     Chief Complaint:  Physical Exam.  Has several complaints today.    History of Present Illness    History obtained from the patient.    Reports vomiting chronic (all of life) 3 times per week in the am, once daily while on Metformin ? related to cheese.  She is requesting food allergy testing.  She states her chronic constipation has resolved.    The patient is complaining of persistent vertigo, only slightly improved overall, for the past 2 years since her concussion on 7/10/2020.     The patient is complaining of right wrist tightness and stiffness for several months.  She denies any known injury, trauma, or overuse activity,  but she does work on a farm daily.  She feels like her range of motion is decreased.  Ibuprofen helps some.  She has not tried heat or ice.    The patient states she has a recurrence of tinea versicolor for the past 2 weeks.  This is located under her right breast and over her right mid lateral back.  She states she has had relief in the past with Nizoral  Treatment.    The patient has a history of PCOS, currently off medication.  In the past, Metformin caused GI issues.  She states her menstrual issues have resolved.    The patient has Chronic Allergic Rhinitis, current symptoms of postnasal drainage and sneezing.  She reports relief with Claritin and Flonase.    Vitamin D Deficiency Follow-Up: The patient is here for follow-up of Vitamin D Deficiency, which has been stable.    Interval Events: Vitamin D level on 4/26/2018 was 22.7.    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  Denies fatigue, paresthesias, loss of balance, and gait instability.    Medication: None.    Anxiety Disorder Follow-Up: The patient is here for follow-up of Generalized Anxiety Disorder, which has been stable overall.    Interval Events: Her symptoms recently worsened, but have improved since seeing a counselor twice per week.    Symptoms: Stable  anxiety and intermittent insomnia.  Denies depression, panic attacks, anhedonia, memory loss, concentration issues.    Associated Symptoms: Denies suicidal ideation or thoughts of self-harm.    Medication: Hydroxyzine as needed (usually once every 1 to 2 months) and Melatonin as needed.      Martina Hopkins is a 32 y.o. female who presents for an Annual Physical.  She is non-fasting.    PMH, PSH, SocHx, FamHx, Allergies, and Medications: Reviewed and updated.    Outpatient Medications Prior to Visit   Medication Sig Dispense Refill   • fluticasone (Flonase) 50 MCG/ACT nasal spray 2 sprays into the nostril(s) as directed by provider Daily. 16 g 1   • hydrOXYzine (ATARAX) 25 MG tablet Take 1 tablet by mouth 2 (Two) Times a Day As Needed for Anxiety. 60 tablet 5   • loratadine (Claritin) 10 MG tablet Take 1 tablet by mouth Daily. 30 tablet 1   • benzonatate (TESSALON) 200 MG capsule Take 1 capsule by mouth 3 (Three) Times a Day As Needed for Cough. 21 capsule 0   • predniSONE (DELTASONE) 10 MG (21) dose pack Daily taper 6,5,4,3,2,1 21 each 0   • promethazine-dextromethorphan (PROMETHAZINE-DM) 6.25-15 MG/5ML syrup Take 5 mL by mouth 4 (Four) Times a Day As Needed for Cough. 118 mL 0     No facility-administered medications prior to visit.       Immunization History   Administered Date(s) Administered   • COVID-19 (PFIZER) PURPLE CAP 02/25/2021, 08/09/2021   • HPV Quadrivalent 08/18/2009, 10/27/2009, 03/01/2010   • Hepatitis A 08/18/2009, 05/10/2018   • Hepatitis B 08/15/2003, 10/10/2003, 04/23/2004   • MMR 02/27/1993, 07/29/1997   • Meningococcal Conjugate 08/18/2009   • Td 09/16/2014   • Tdap 10/27/2009         Patient Active Problem List   Diagnosis   • PCOS (polycystic ovarian syndrome)   • Generalized anxiety disorder   • Vitamin D deficiency       Health Habits:  Dental Exam. not up to date - last visit 2 years ago  Eye Exam. up to date  Hearing Loss:  No  Exercise: 6 times/week.  Current exercise activities  include: Farming  Diet: 1/2 Healthy  Multivitamin: No    Safe Driving:  Yes  Seat Belt:  Yes  Bike Helmet:  Yes  Skin Screening:  Yes  Sunscreen: Yes  SBE / MEGAN: Yes  Sexual Activity:  Yes  Birth Control:  N/A  STD Prevention:  N/A    Last Pap: Patient not sure date of last Pap, but has 1 scheduled in 2 weeks.  Last Mammogram:  N/A  Last DEXA Scan: N/A  Last Colonoscopy: 3/10/14- normal except rectal irritation (bx neg)  Last PSA: N/A    Social:    Social History     Socioeconomic History   • Marital status:    • Number of children: 0   Tobacco Use   • Smoking status: Never Smoker   • Smokeless tobacco: Never Used   Vaping Use   • Vaping Use: Never used   Substance and Sexual Activity   • Alcohol use: Yes     Comment: 1 cocktail 3-4 times per week   • Drug use: Not Currently     Comment: experimented with multiple drugs age [age]   • Sexual activity: Yes     Partners: Female     Birth control/protection: None         Current Medical Providers:    Taylor Ventura MD (Internal Medicine / Pediatrics)    The [facility] providers who are involved in the care of this patient are listed above.         Review of Systems   Constitutional: Positive for unexpected weight change (up 12 pounds in 2 years). Negative for appetite change, chills, fatigue and fever.        No night sweats.    HENT: Positive for postnasal drip (am with allergies) and sneezing (am with allergies). Negative for congestion, ear discharge, ear pain, hearing loss, nosebleeds, rhinorrhea, sinus pressure, sinus pain, sore throat, tinnitus and voice change.         Denies snoring.   Eyes: Negative for photophobia, pain, discharge, redness, itching and visual disturbance.   Respiratory: Negative for cough, chest tightness, shortness of breath, wheezing and stridor.         No chest congestion.  No hemoptysis.   Cardiovascular: Negative for chest pain, palpitations and leg swelling.        No orthopnea, WELLER, or PND.  No claudication or syncope.  "  Gastrointestinal: Positive for vomiting. Negative for abdominal pain, blood in stool, constipation, diarrhea, nausea and rectal pain.        No melena.  No hematemesis.  No heartburn, dysphagia or odynophagia.  No early satiety, belching, or bloating.    Endocrine: Negative for cold intolerance, heat intolerance, polydipsia, polyphagia and polyuria.        No hair loss or dry skin.  No hot flashes.     Genitourinary: Negative for difficulty urinating, dyspareunia, dysuria, flank pain, frequency, hematuria, menstrual problem, pelvic pain, urgency, vaginal bleeding and vaginal discharge.        No nocturia, incomplete emptying, or incontinence.   Musculoskeletal: Positive for arthralgias and myalgias. Negative for back pain, gait problem, joint swelling, neck pain and neck stiffness.        No joint stiffness.   Skin: Negative for rash.        No new skin lesions or changes in skin lesions. No breast pain or masses.  No nipple discharge or nipple inversion.   Neurological: Negative for dizziness, tremors, syncope, speech difficulty, weakness, light-headedness, numbness and headaches.        Reports persistent vertigo.  No tingling.  No memory loss.     Hematological: Negative for adenopathy. Does not bruise/bleed easily.   Psychiatric/Behavioral: Positive for sleep disturbance. Negative for confusion, decreased concentration, self-injury and suicidal ideas. The patient is nervous/anxious.         No depression.           Objective     Vitals:    06/16/22 1122   BP: 102/62   BP Location: Right arm   Pulse: 72   Resp: 20   Temp: 97.5 °F (36.4 °C)   TempSrc: Temporal   Weight: 82.6 kg (182 lb 2 oz)   Height: 155.6 cm (61.25\")       Body mass index is 34.13 kg/m².    Physical Exam  Vitals and nursing note reviewed.   Constitutional:       Appearance: Normal appearance. She is well-developed. She is obese.   HENT:      Head: Normocephalic and atraumatic.      Right Ear: Tympanic membrane, ear canal and external ear " normal.      Left Ear: Tympanic membrane, ear canal and external ear normal.      Mouth/Throat:      Mouth: Mucous membranes are moist. No oral lesions.      Pharynx: Oropharynx is clear.      Comments: Tonsils normal.  Eyes:      Extraocular Movements: Extraocular movements intact.      Conjunctiva/sclera: Conjunctivae normal.      Pupils: Pupils are equal, round, and reactive to light.      Comments: Fundi normal bilaterally.   Neck:      Thyroid: No thyromegaly.      Vascular: No carotid bruit.   Cardiovascular:      Rate and Rhythm: Normal rate and regular rhythm.      Pulses: Normal pulses.      Heart sounds: Normal heart sounds. No murmur heard.    No friction rub. No gallop.   Pulmonary:      Effort: Pulmonary effort is normal.      Breath sounds: Normal breath sounds.   Chest:   Breasts:      Right: No inverted nipple, mass, nipple discharge, skin change, tenderness, axillary adenopathy or supraclavicular adenopathy.      Left: No inverted nipple, mass, nipple discharge, skin change, tenderness, axillary adenopathy or supraclavicular adenopathy.       Abdominal:      General: Bowel sounds are normal. There is no distension or abdominal bruit.      Palpations: Abdomen is soft. There is no hepatomegaly, splenomegaly or mass.      Tenderness: There is no abdominal tenderness.   Genitourinary:     Comments:  and rectal exam deferred.  Musculoskeletal:         General: Normal range of motion.      Cervical back: Normal range of motion and neck supple.      Right lower leg: No edema.      Left lower leg: No edema.      Comments: There is no tenderness to palpation of the right wrist.  No erythema, edema, or warmth.  There is no pain with right wrist flexion and extension nor with right hand supination and pronation.  Phalen's and Tinel signs are negative.   Lymphadenopathy:      Cervical: No cervical adenopathy.      Upper Body:      Right upper body: No supraclavicular or axillary adenopathy.      Left upper  body: No supraclavicular or axillary adenopathy.   Skin:     Findings: Rash (Erythematous macular nonconfluent rash over right mid lateral back and under right breast) present.      Comments: No atypical skin lesions.   Neurological:      Mental Status: She is alert.      Cranial Nerves: Cranial nerves are intact.      Motor: Motor function is intact. No abnormal muscle tone.      Coordination: Coordination is intact.      Gait: Gait is intact.      Deep Tendon Reflexes: Reflexes are normal and symmetric.   Psychiatric:         Mood and Affect: Mood normal.         PHQ-2 Depression Screening  Little interest or pleasure in doing things? 0-->not at all   Feeling down, depressed, or hopeless? 0-->not at all   PHQ-2 Total Score 0         Counseling was given to patient for the following topics: appropriate exercise, healthy eating habits, disease prevention, risk factors for cancer, importance of self breast exam and breast health, importance of immunizations, including risks and benefits, sun safety, seatbelt use and safe driving. Also discussed the importance of regular dental and vision care, as well recommendation for a yearly screening skin exam after age 40.  Written information provided to patient on these topics and other health maintenance issues.        Diagnoses and all orders for this visit:    1. Encounter for health maintenance examination in adult (Primary)  -     Lipid Panel; Future  -     Comprehensive Metabolic Panel; Future  -     TSH; Future  -     CBC & Differential; Future    2. PCOS (polycystic ovarian syndrome)  -     POC Glycosylated Hemoglobin (Hb A1C); Future    3. Vitamin D deficiency  -     Vitamin D 25 Hydroxy; Future    4. Generalized anxiety disorder  -     Vitamin D 25 Hydroxy; Future    5. Vertigo  -     Ambulatory Referral to Physical Therapy Evaluate and treat    6. Vomiting without nausea, unspecified vomiting type  -     Ambulatory Referral to Allergy    7. Right wrist pain  -      meloxicam (MOBIC) 15 MG tablet; Take 1 tablet by mouth Daily As Needed for Moderate Pain .  Dispense: 30 tablet; Refill: 0- NEW    Recommended she take the NSAIDS (Meloxicam) for a 2 full weeks, then as needed.     Recommended ice, 2-3 times daily.     The patient agrees to call if no better in 2 weeks, and we will do an x-ray..    8. Tinea versicolor  -     ketoconazole (NIZORAL) 2 % cream; Apply 1 application topically to the appropriate area as directed Daily for 14 days.  Dispense: 60 g; Refill: 0- NEW    9. Screening for cardiovascular condition  -     Lipid Panel; Future  -     Comprehensive Metabolic Panel; Future  -     TSH; Future  -     CBC & Differential; Future      The patient agrees to return for fasting labs.    The patient states she will come back for her COVID-19 booster.      BMI is >= 30 and <35. (Class 1 Obesity). The following options were offered after discussion;: exercise counseling/recommendations and nutrition counseling/recommendations            Return in about 1 year (around 6/16/2023) for Annual physical, fasting.

## 2022-08-09 ENCOUNTER — APPOINTMENT (OUTPATIENT)
Dept: GENERAL RADIOLOGY | Facility: HOSPITAL | Age: 32
End: 2022-08-09

## 2022-08-09 ENCOUNTER — HOSPITAL ENCOUNTER (EMERGENCY)
Facility: HOSPITAL | Age: 32
Discharge: HOME | End: 2022-08-09
Attending: EMERGENCY MEDICINE | Admitting: EMERGENCY MEDICINE

## 2022-08-09 VITALS
TEMPERATURE: 98.2 F | DIASTOLIC BLOOD PRESSURE: 67 MMHG | HEIGHT: 62 IN | RESPIRATION RATE: 19 BRPM | BODY MASS INDEX: 34.04 KG/M2 | HEART RATE: 72 BPM | OXYGEN SATURATION: 98 % | SYSTOLIC BLOOD PRESSURE: 102 MMHG | WEIGHT: 185 LBS

## 2022-08-09 DIAGNOSIS — S76.011A HIP STRAIN, RIGHT, INITIAL ENCOUNTER: ICD-10-CM

## 2022-08-09 DIAGNOSIS — R07.81 RIB PAIN ON RIGHT SIDE: Primary | ICD-10-CM

## 2022-08-09 PROCEDURE — 73502 X-RAY EXAM HIP UNI 2-3 VIEWS: CPT

## 2022-08-09 PROCEDURE — 99283 EMERGENCY DEPT VISIT LOW MDM: CPT

## 2022-08-09 PROCEDURE — 71101 X-RAY EXAM UNILAT RIBS/CHEST: CPT

## 2022-08-09 RX ORDER — CYCLOBENZAPRINE HCL 5 MG
5 TABLET ORAL 3 TIMES DAILY PRN
Qty: 12 TABLET | Refills: 0 | Status: SHIPPED | OUTPATIENT
Start: 2022-08-09 | End: 2022-12-02

## 2022-08-09 RX ORDER — LIDOCAINE 50 MG/G
1 PATCH TOPICAL EVERY 24 HOURS
Qty: 30 EACH | Refills: 0 | Status: SHIPPED | OUTPATIENT
Start: 2022-08-09 | End: 2022-12-02

## 2022-08-09 RX ORDER — LIDOCAINE 50 MG/G
1 PATCH TOPICAL
Status: DISCONTINUED | OUTPATIENT
Start: 2022-08-09 | End: 2022-08-09 | Stop reason: HOSPADM

## 2022-08-09 RX ORDER — CYCLOBENZAPRINE HCL 10 MG
10 TABLET ORAL ONCE
Status: COMPLETED | OUTPATIENT
Start: 2022-08-09 | End: 2022-08-09

## 2022-08-09 RX ORDER — ACETAMINOPHEN 500 MG
1000 TABLET ORAL ONCE
Status: COMPLETED | OUTPATIENT
Start: 2022-08-09 | End: 2022-08-09

## 2022-08-09 RX ADMIN — CYCLOBENZAPRINE 10 MG: 10 TABLET, FILM COATED ORAL at 13:18

## 2022-08-09 RX ADMIN — ACETAMINOPHEN 1000 MG: 500 TABLET ORAL at 13:18

## 2022-08-09 RX ADMIN — LIDOCAINE 1 PATCH: 50 PATCH CUTANEOUS at 13:51

## 2022-08-09 NOTE — DISCHARGE INSTRUCTIONS
Take Flexeril as needed as prescribed.  Use Lidoderm patches as prescribed.  Take Tylenol and Aleve as needed per directions on the package.  Follow-up with your PCP for further outpatient evaluation if symptoms persist.  Return to the ER for new or worsening symptoms or acute concerns.

## 2022-08-09 NOTE — ED PROVIDER NOTES
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  Patient states that the horse went forward and she flew backwards and landed hard on her right hip in the grass.  She states that she has right rib pain as well.  She states that the pain does seem to be slightly better than yesterday but she is unsure if this is because she took a hydrocodone yesterday.  She states she has taken meloxicam at around 8 this morning but denies any other over-the-counter medications or treatments prior to arrival.  She states that she has been able to ambulate but has been using a walker that she had at home.  She states that she does have neck and shoulder soreness but denies pain along her spine and states she has been moving her upper extremities without any major difficulty.  She states that she believes it is just muscle strain.  She denies additional injuries or complaints at this time.    Review of Systems   Musculoskeletal:        Shoulder and neck pain, right hip and rib pain   All other systems reviewed and are negative.      Past Medical History:   Diagnosis Date   • Constipation     Resolved   • Generalized anxiety disorder    • History of abnormal cervical Pap smear 07/2013    repeat normal per patient   • History of colonoscopy 03/10/2014    normal except rectal irritation (bx neg)   • History of dog bite 09/2014    left wrist   • History of varicella    • PCOS (polycystic ovarian syndrome)     Dx 2014.   • PCOS (polycystic ovarian syndrome)    • Vitamin D deficiency     Dx 4/17       Allergies   Allergen Reactions   • Gildess 1.5-30 [Norethindrone-Eth Estradiol] Other (See Comments)     Excessive bleeding   • Lexapro [Escitalopram Oxalate] Other (See Comments)     Body tingling and burning feeling  Vomiting excessive crying        Past Surgical History:   Procedure Laterality Date    • NO PAST SURGERIES         Family History   Problem Relation Age of Onset   • Hypothyroidism Sister         due to thyroidectomy   • Thyroid cancer Sister    • Diabetes Maternal Uncle    • Diabetes Maternal Grandmother    • Coronary artery disease Maternal Grandfather    • Colon cancer Paternal Grandmother    • Diabetes Paternal Grandfather        Social History     Socioeconomic History   • Marital status:    • Number of children: 0   Tobacco Use   • Smoking status: Never Smoker   • Smokeless tobacco: Never Used   Vaping Use   • Vaping Use: Never used   Substance and Sexual Activity   • Alcohol use: Yes     Comment: 1 cocktail 3-4 times per week   • Drug use: Not Currently     Comment: experimented with multiple drugs age [age]   • Sexual activity: Yes     Partners: Female     Birth control/protection: None           Objective   Physical Exam  Vitals and nursing note reviewed.   Constitutional:       General: She is not in acute distress.     Appearance: She is not toxic-appearing.   HENT:      Head: Normocephalic and atraumatic.      Right Ear: External ear normal.      Left Ear: External ear normal.      Nose: Nose normal.   Eyes:      Extraocular Movements: Extraocular movements intact.      Conjunctiva/sclera: Conjunctivae normal.   Cardiovascular:      Rate and Rhythm: Normal rate.      Heart sounds: Normal heart sounds.   Pulmonary:      Effort: Pulmonary effort is normal.      Breath sounds: Normal breath sounds.   Abdominal:      General: There is no distension.      Palpations: Abdomen is soft.   Musculoskeletal:         General: Tenderness present. No deformity or signs of injury.      Cervical back: Normal range of motion and neck supple.      Comments: Right hip and ribs are tender to palpation, neurovascularly intact, no obvious deformity or ecchymosis   Skin:     General: Skin is warm and dry.   Neurological:      General: No focal deficit present.      Mental Status: She is alert and  oriented to person, place, and time.   Psychiatric:         Mood and Affect: Mood normal.         Behavior: Behavior normal.         Procedures           ED Course  ED Course as of 08/09/22 1343   Tue Aug 09, 2022   1332 Narrative & Impression  CLINICAL INDICATION:    pain after falling on ground while training horse     EXAMINATION TECHNIQUE:   XR HIP W OR WO PELVIS 2-3 VIEW RIGHT-, XR RIBS RIGHT W PA CHEST-     COMPARISON:  None.     FINDINGS:  Chest: Lungs are clear without evidence of focal airspace consolidation.  No pneumothorax. No pleural effusion. Heart and mediastinal contours are  within normal limits. No acute osseous or soft tissue abnormalities. No  free air under the hemidiaphragms.     RIBS: No displaced rib fractures.     Pelvis/right hip: Right hip joint is well approximated. There is subtle  ossific fragmentation along the superior lateral aspect of the  acetabulum, likely sequela of prior trauma or accessory ossicles. No  acute fracture or malalignment. No significant degenerative changes.  Imaged portions of the left hip joint is well approximated. Mild  bilateral SI joint degenerative changes. Pubic symphysis is intact.     IMPRESSION:  No evidence of acute injury in the chest. No displaced rib fractures. No  acute fracture or malalignment of the right hip.           Images personally reviewed, interpreted and dictated by JASSI Chaparro.                This report was signed and finalized on 8/9/2022 1:23 PM by JASSI Chaparro.          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  Patient is hemodynamically stable, no acute distress, nontoxic-appearing on exam.  She has been able to ambulate since the accident.  No obvious deformity  or bruising or abnormality on exam.  X-rays of right ribs and hip were performed and found to be acutely unremarkable per radiology as reported above.  Patient was given Flexeril, Lidoderm patches, and Tylenol.  She was given a prescription for Flexeril and Lidoderm patches.  She was advised to rest and ice the areas of pain at home.  She was advised that if she continues to have pain she should follow-up with her PCP for further outpatient evaluation.  Precautions were given for return to the ER for any new or worsening symptoms.    Final diagnoses:   Rib pain on right side   Hip strain, right, initial encounter       ED Disposition  ED Disposition     ED Disposition   Discharge    Condition   Stable    Comment   --             Taylor Ventura MD  [address] [facility] [address]  [place] [id number] [phone]    Schedule an appointment as soon as possible for a visit   for further outpatient evaluation if symptoms persist    [facility] Emergency Department  [address] [place] [id number] [phone]  Go to   As needed, If symptoms worsen         Medication List      New Prescriptions    cyclobenzaprine 5 MG tablet  Commonly known as: FLEXERIL  Take 1 tablet by mouth 3 (Three) Times a Day As Needed for Muscle Spasms.     lidocaine 5 %  Commonly known as: LIDODERM  Place 1 patch on the skin as directed by provider Daily. Remove & Discard patch within 12 hours or as directed by MD           Where to Get Your Medications      These medications were sent to [facility] [address] [place] [address] [facility] [address] [facility]. - [phone] [facility] [phone] [address] [address]    Phone: [phone]   · cyclobenzaprine 5 MG tablet  · lidocaine 5 %          Katy Lazcano PA-C  08/09/22 1353

## 2022-08-24 DIAGNOSIS — M25.531 RIGHT WRIST PAIN: ICD-10-CM

## 2022-08-24 RX ORDER — MELOXICAM 15 MG/1
TABLET ORAL
Qty: 30 TABLET | Refills: 0 | Status: SHIPPED | OUTPATIENT
Start: 2022-08-24 | End: 2022-12-02

## 2022-12-07 ENCOUNTER — TELEMEDICINE (OUTPATIENT)
Dept: INTERNAL MEDICINE | Facility: CLINIC | Age: 32
End: 2022-12-07

## 2022-12-07 VITALS — TEMPERATURE: 98 F | BODY MASS INDEX: 33.29 KG/M2 | WEIGHT: 182 LBS

## 2022-12-07 DIAGNOSIS — J20.9 ACUTE BRONCHITIS, UNSPECIFIED ORGANISM: Primary | ICD-10-CM

## 2022-12-07 PROCEDURE — 99213 OFFICE O/P EST LOW 20 MIN: CPT | Performed by: INTERNAL MEDICINE

## 2022-12-07 RX ORDER — AZITHROMYCIN 250 MG/1
TABLET, FILM COATED ORAL
Qty: 6 TABLET | Refills: 0 | Status: SHIPPED | OUTPATIENT
Start: 2022-12-07

## 2022-12-07 RX ORDER — PREDNISONE 20 MG/1
TABLET ORAL
Qty: 11 TABLET | Refills: 0 | Status: SHIPPED | OUTPATIENT
Start: 2022-12-07

## 2022-12-07 RX ORDER — FLUCONAZOLE 150 MG/1
150 TABLET ORAL ONCE
Qty: 2 TABLET | Refills: 1 | Status: SHIPPED | OUTPATIENT
Start: 2022-12-07 | End: 2022-12-07

## 2022-12-07 NOTE — PROGRESS NOTES
Subjective       Martina Hopkins is a 32 y.o. female.     Chief Complaint   Patient presents with   • URI       History obtained from the patient.    The patient has chosen to receive care through a Telehealth visit.  The patient consented to use a video/audio connection for her medical care today.    The patient presents during the COVID-19 pandemic/federally declared state of public health emergency.  This service was conducted via Telehealth audio/visual by I-phone.  Connected to the patient using autoGrapht/Ometricsom.  This visit occurred with the Provider located at [facility] (@ [place], [place]) and the patient located at home in the [facility].  Other participants in this Telehealth visit included: None      The patient states that she and her wife initially got sick on 11/25/2022, but many of her symptoms have improved.    She was seen at [facility] on 12/2/2022 and diagnosed with a viral URI.  Testing for COVID and Influenza was negative.  She was prescribed an Albuterol inhaler and Prednisone 40 mg daily for 5 days.    URI   This is a new problem. Episode onset: 11/25/22. The problem has been gradually improving. The maximum temperature recorded prior to her arrival was 100.4 - 100.9 F. The fever has been present for 3 to 4 days (now resolved). Associated symptoms include chest pain (pressure), congestion (chest ), coughing (was productive of green sputum, no hemoptysis, now dry and hacking) and wheezing (mild). Pertinent negatives include no abdominal pain, diarrhea, ear pain, headaches, nausea, neck pain, rash, rhinorrhea, sinus pain, sore throat or vomiting. Associated symptoms comments: No loss of taste or smell  . She has tried acetaminophen and NSAIDs (s/p Prednisone x 5 days.  Added Robitussin and Tessalon Perles. Using Albuterol inhaler BID) for the symptoms. The treatment provided significant relief.      Of note, she states she has done 3 home COVID  tests which were all negative.    The following portions of the patient's history were reviewed and updated as appropriate: allergies, current medications, past family history, past medical history, past social history, past surgical history and problem list.      Review of Systems   Constitutional: Positive for fatigue. Negative for fever.   HENT: Positive for congestion (chest ). Negative for ear pain, postnasal drip, rhinorrhea, sinus pressure, sinus pain and sore throat.    Eyes: Negative for pain, discharge, redness and itching.   Respiratory: Positive for cough (was productive of green sputum, no hemoptysis, now dry and hacking), chest tightness, shortness of breath and wheezing (mild).    Cardiovascular: Positive for chest pain (pressure).   Gastrointestinal: Negative for abdominal pain, diarrhea, nausea and vomiting.   Musculoskeletal: Negative for arthralgias, myalgias, neck pain and neck stiffness.   Skin: Negative for rash.   Neurological: Negative for headaches.   Hematological: Negative for adenopathy.           Objective     Temperature 98 °F (36.7 °C), temperature source Oral, weight 82.6 kg (182 lb), last menstrual period 11/07/2022, not currently breastfeeding.    Physical Exam  Vitals reviewed.   Constitutional:       Comments: BMI greater than 30   Pulmonary:      Effort: Pulmonary effort is normal. No respiratory distress.   Neurological:      Mental Status: She is alert.   Psychiatric:         Mood and Affect: Mood normal.           Assessment & Plan   Diagnoses and all orders for this visit:    1. Acute bronchitis, unspecified organism (Primary)  -     predniSONE (DELTASONE) 20 MG tablet; 2 pills daily x 3 days, then 1 pill daily x 3 days, then 1/2 pill daily x 4 days  Dispense: 11 tablet; Refill: 0  -     azithromycin (Zithromax Z-Skyler) 250 MG tablet; Take 2 tablets the first day, then 1 tablet daily for 4 days.  Dispense: 6 tablet; Refill: 0  -     fluconazole (Diflucan) 150 MG tablet; Take 1  tablet by mouth 1 (One) Time for 1 dose. May repeat after 4 days.  Dispense: 2 tablet; Refill: 1      Continue current over the counter medication, Tessalon perles, and plenty of fluids.      Return if symptoms worsen or fail to improve.

## 2022-12-14 NOTE — TELEPHONE ENCOUNTER
Glenna navarro [place] [phone]  Spoke to pharmacist confirmed the Metformin  mg tablet, has already been pulled off the shelf. Pharmcist confirmed theyv'e recieved a new shipment and to his knowledge everything should be fine.   Please advise?  
Patient called and stated that refills are needed for the following prescription(s):    Flonase  Allergy medicine  hydrOXYzine (ATARAX) 25 MG tablet  metFORMIN ER (GLUCOPHAGE-XR) 500 MG 24 hr tablet    Pharmacy: [facility]  PH: [phone]  FX: [phone]    Congestion, runny nose, red eyes-Patient is asking for allergy medication as well as Flonase.  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      Patient callback: [phone]    Please advise.    
Please confirm regarding Metformin recall?  
Please have pt call her pharmacy to see if their Metformin XR has been recalled. If it has not she can let us know and we will send in. Otherwise will discuss alternatives (I.e. Immediate release metformin).    Did send flonase, claritin and hydroxyzine as requested.  
Pt notified, she will contact us back if needed  
Sent metformin via E-rx  
4

## 2023-07-21 ENCOUNTER — OFFICE VISIT (OUTPATIENT)
Dept: INTERNAL MEDICINE | Facility: CLINIC | Age: 33
End: 2023-07-21
Payer: MEDICAID

## 2023-07-21 VITALS
DIASTOLIC BLOOD PRESSURE: 72 MMHG | SYSTOLIC BLOOD PRESSURE: 108 MMHG | RESPIRATION RATE: 16 BRPM | BODY MASS INDEX: 33.17 KG/M2 | WEIGHT: 181.38 LBS | HEART RATE: 70 BPM | TEMPERATURE: 97.8 F

## 2023-07-21 DIAGNOSIS — R10.13 EPIGASTRIC PAIN: Primary | ICD-10-CM

## 2023-07-21 DIAGNOSIS — Z13.6 SCREENING FOR CARDIOVASCULAR CONDITION: ICD-10-CM

## 2023-07-21 DIAGNOSIS — E55.9 VITAMIN D DEFICIENCY: ICD-10-CM

## 2023-07-21 DIAGNOSIS — R11.2 NAUSEA AND VOMITING, UNSPECIFIED VOMITING TYPE: ICD-10-CM

## 2023-07-21 DIAGNOSIS — R19.7 DIARRHEA OF PRESUMED INFECTIOUS ORIGIN: ICD-10-CM

## 2023-07-21 DIAGNOSIS — B36.0 TINEA VERSICOLOR: ICD-10-CM

## 2023-07-21 DIAGNOSIS — M79.10 MYALGIA: ICD-10-CM

## 2023-07-21 LAB
25(OH)D3 SERPL-MCNC: 27.1 NG/ML (ref 30–100)
ALBUMIN SERPL-MCNC: 4.4 G/DL (ref 3.5–5.2)
ALBUMIN/GLOB SERPL: 1.9 G/DL
ALP SERPL-CCNC: 45 U/L (ref 39–117)
ALT SERPL W P-5'-P-CCNC: 12 U/L (ref 1–33)
ANION GAP SERPL CALCULATED.3IONS-SCNC: 11.7 MMOL/L (ref 5–15)
AST SERPL-CCNC: 13 U/L (ref 1–32)
BASOPHILS # BLD AUTO: 0.02 10*3/MM3 (ref 0–0.2)
BASOPHILS NFR BLD AUTO: 0.3 % (ref 0–1.5)
BILIRUB SERPL-MCNC: 0.3 MG/DL (ref 0–1.2)
BUN SERPL-MCNC: 10 MG/DL (ref 6–20)
BUN/CREAT SERPL: 15.2 (ref 7–25)
CALCIUM SPEC-SCNC: 9.2 MG/DL (ref 8.6–10.5)
CHLORIDE SERPL-SCNC: 107 MMOL/L (ref 98–107)
CHOLEST SERPL-MCNC: 189 MG/DL (ref 0–200)
CO2 SERPL-SCNC: 21.3 MMOL/L (ref 22–29)
CREAT SERPL-MCNC: 0.66 MG/DL (ref 0.57–1)
CRP SERPL-MCNC: <0.3 MG/DL (ref 0–0.5)
DEPRECATED RDW RBC AUTO: 39.8 FL (ref 37–54)
EGFRCR SERPLBLD CKD-EPI 2021: 120.4 ML/MIN/1.73
EOSINOPHIL # BLD AUTO: 0.06 10*3/MM3 (ref 0–0.4)
EOSINOPHIL NFR BLD AUTO: 1 % (ref 0.3–6.2)
ERYTHROCYTE [DISTWIDTH] IN BLOOD BY AUTOMATED COUNT: 12.3 % (ref 12.3–15.4)
ERYTHROCYTE [SEDIMENTATION RATE] IN BLOOD: 5 MM/HR (ref 0–20)
GLOBULIN UR ELPH-MCNC: 2.3 GM/DL
GLUCOSE SERPL-MCNC: 93 MG/DL (ref 65–99)
HCT VFR BLD AUTO: 40 % (ref 34–46.6)
HDLC SERPL-MCNC: 59 MG/DL (ref 40–60)
HGB BLD-MCNC: 13.6 G/DL (ref 12–15.9)
IMM GRANULOCYTES # BLD AUTO: 0.01 10*3/MM3 (ref 0–0.05)
IMM GRANULOCYTES NFR BLD AUTO: 0.2 % (ref 0–0.5)
LDLC SERPL CALC-MCNC: 115 MG/DL (ref 0–100)
LDLC/HDLC SERPL: 1.93 {RATIO}
LIPASE SERPL-CCNC: 23 U/L (ref 13–60)
LYMPHOCYTES # BLD AUTO: 1.85 10*3/MM3 (ref 0.7–3.1)
LYMPHOCYTES NFR BLD AUTO: 32.3 % (ref 19.6–45.3)
MCH RBC QN AUTO: 30.2 PG (ref 26.6–33)
MCHC RBC AUTO-ENTMCNC: 34 G/DL (ref 31.5–35.7)
MCV RBC AUTO: 88.9 FL (ref 79–97)
MONOCYTES # BLD AUTO: 0.32 10*3/MM3 (ref 0.1–0.9)
MONOCYTES NFR BLD AUTO: 5.6 % (ref 5–12)
NEUTROPHILS NFR BLD AUTO: 3.47 10*3/MM3 (ref 1.7–7)
NEUTROPHILS NFR BLD AUTO: 60.6 % (ref 42.7–76)
NRBC BLD AUTO-RTO: 0 /100 WBC (ref 0–0.2)
PLATELET # BLD AUTO: 256 10*3/MM3 (ref 140–450)
PMV BLD AUTO: 10 FL (ref 6–12)
POTASSIUM SERPL-SCNC: 4.3 MMOL/L (ref 3.5–5.2)
PROT SERPL-MCNC: 6.7 G/DL (ref 6–8.5)
RBC # BLD AUTO: 4.5 10*6/MM3 (ref 3.77–5.28)
SODIUM SERPL-SCNC: 140 MMOL/L (ref 136–145)
TRIGL SERPL-MCNC: 81 MG/DL (ref 0–150)
TSH SERPL DL<=0.05 MIU/L-ACNC: 0.82 UIU/ML (ref 0.27–4.2)
VLDLC SERPL-MCNC: 15 MG/DL (ref 5–40)
WBC NRBC COR # BLD: 5.73 10*3/MM3 (ref 3.4–10.8)

## 2023-07-21 PROCEDURE — 82306 VITAMIN D 25 HYDROXY: CPT | Performed by: INTERNAL MEDICINE

## 2023-07-21 PROCEDURE — 80050 GENERAL HEALTH PANEL: CPT | Performed by: INTERNAL MEDICINE

## 2023-07-21 PROCEDURE — 85652 RBC SED RATE AUTOMATED: CPT | Performed by: INTERNAL MEDICINE

## 2023-07-21 PROCEDURE — 86140 C-REACTIVE PROTEIN: CPT | Performed by: INTERNAL MEDICINE

## 2023-07-21 PROCEDURE — 83690 ASSAY OF LIPASE: CPT | Performed by: INTERNAL MEDICINE

## 2023-07-21 PROCEDURE — 80061 LIPID PANEL: CPT | Performed by: INTERNAL MEDICINE

## 2023-07-21 RX ORDER — MELOXICAM 15 MG/1
15 TABLET ORAL DAILY PRN
Qty: 30 TABLET | Refills: 2 | Status: SHIPPED | OUTPATIENT
Start: 2023-07-21

## 2023-07-21 RX ORDER — KETOCONAZOLE 20 MG/G
1 CREAM TOPICAL 2 TIMES DAILY
Qty: 60 G | Refills: 0 | Status: SHIPPED | OUTPATIENT
Start: 2023-07-21 | End: 2023-08-04

## 2023-07-21 RX ORDER — FAMOTIDINE 20 MG/1
20 TABLET, FILM COATED ORAL DAILY PRN
Qty: 30 TABLET | Refills: 5 | Status: SHIPPED | OUTPATIENT
Start: 2023-07-21

## 2023-08-03 ENCOUNTER — TELEPHONE (OUTPATIENT)
Dept: INTERNAL MEDICINE | Facility: CLINIC | Age: 33
End: 2023-08-03
Payer: MEDICAID

## 2023-08-03 ENCOUNTER — LAB (OUTPATIENT)
Dept: INTERNAL MEDICINE | Facility: CLINIC | Age: 33
End: 2023-08-03
Payer: MEDICAID

## 2023-08-03 DIAGNOSIS — R10.13 EPIGASTRIC PAIN: ICD-10-CM

## 2023-08-03 DIAGNOSIS — R19.7 DIARRHEA OF PRESUMED INFECTIOUS ORIGIN: ICD-10-CM

## 2023-08-03 LAB
ADV 40+41 DNA STL QL NAA+NON-PROBE: NOT DETECTED
ASTRO TYP 1-8 RNA STL QL NAA+NON-PROBE: NOT DETECTED
C CAYETANENSIS DNA STL QL NAA+NON-PROBE: NOT DETECTED
C COLI+JEJ+UPSA DNA STL QL NAA+NON-PROBE: NOT DETECTED
CRYPTOSP DNA STL QL NAA+NON-PROBE: NOT DETECTED
E HISTOLYT DNA STL QL NAA+NON-PROBE: NOT DETECTED
EAEC PAA PLAS AGGR+AATA ST NAA+NON-PRB: NOT DETECTED
EC STX1+STX2 GENES STL QL NAA+NON-PROBE: NOT DETECTED
EPEC EAE GENE STL QL NAA+NON-PROBE: NOT DETECTED
ETEC LTA+ST1A+ST1B TOX ST NAA+NON-PROBE: NOT DETECTED
EXPIRATION DATE: NORMAL
G LAMBLIA DNA STL QL NAA+NON-PROBE: NOT DETECTED
GASTROCULT GAST QL: NEGATIVE
Lab: NORMAL
NOROVIRUS GI+II RNA STL QL NAA+NON-PROBE: DETECTED
P SHIGELLOIDES DNA STL QL NAA+NON-PROBE: NOT DETECTED
RVA RNA STL QL NAA+NON-PROBE: NOT DETECTED
S ENT+BONG DNA STL QL NAA+NON-PROBE: NOT DETECTED
SAPO I+II+IV+V RNA STL QL NAA+NON-PROBE: NOT DETECTED
SHIGELLA SP+EIEC IPAH ST NAA+NON-PROBE: NOT DETECTED
V CHOL+PARA+VUL DNA STL QL NAA+NON-PROBE: NOT DETECTED
V CHOLERAE DNA STL QL NAA+NON-PROBE: NOT DETECTED
Y ENTEROCOL DNA STL QL NAA+NON-PROBE: NOT DETECTED

## 2023-08-03 PROCEDURE — 87338 HPYLORI STOOL AG IA: CPT | Performed by: INTERNAL MEDICINE

## 2023-08-03 PROCEDURE — 87177 OVA AND PARASITES SMEARS: CPT | Performed by: INTERNAL MEDICINE

## 2023-08-03 PROCEDURE — 87507 IADNA-DNA/RNA PROBE TQ 12-25: CPT | Performed by: INTERNAL MEDICINE

## 2023-08-03 PROCEDURE — 87209 SMEAR COMPLEX STAIN: CPT | Performed by: INTERNAL MEDICINE

## 2023-08-03 PROCEDURE — 82274 ASSAY TEST FOR BLOOD FECAL: CPT | Performed by: INTERNAL MEDICINE

## 2023-08-04 ENCOUNTER — TELEPHONE (OUTPATIENT)
Dept: INTERNAL MEDICINE | Facility: CLINIC | Age: 33
End: 2023-08-04
Payer: MEDICAID

## 2023-08-04 NOTE — TELEPHONE ENCOUNTER
Caller: Martina Hopkins    Relationship: Self    Best call back number: [phone]     What is the best time to reach you: ANYTIME ASAP TODAY    Who are you requesting to speak with (clinical staff, provider,  specific staff member): PCP/MA        What was the call regarding: PATIENT WAS IN AND HAS NOROVIRUS. SHE WANTS TO KNOW IF ITS CONTAGIOUS AND SHE WAS GOING TO VISIT A FRIEND THAT HAS A BABY    Is it okay if the provider responds through MyChart: CALLBACK TODAY ASAP

## 2023-08-05 LAB — H PYLORI AG STL QL IA: NEGATIVE

## 2023-08-07 NOTE — TELEPHONE ENCOUNTER
Yes it is very contagious.  It is usually spread from stool or emesis contact, but can be spread through the air.  She should consider herself to be contagious until at least 3 days and may be up to 2 weeks after she recovers from from her symptoms.

## 2023-08-09 LAB
O+P SPEC MICRO: NORMAL
O+P STL TRI STN: NORMAL

## 2023-09-14 ENCOUNTER — TELEPHONE (OUTPATIENT)
Dept: INTERNAL MEDICINE | Facility: CLINIC | Age: 33
End: 2023-09-14
Payer: MEDICAID

## 2023-09-14 ENCOUNTER — HOSPITAL ENCOUNTER (OUTPATIENT)
Dept: ULTRASOUND IMAGING | Facility: HOSPITAL | Age: 33
Discharge: HOME | End: 2023-09-14
Admitting: INTERNAL MEDICINE
Payer: MEDICAID

## 2023-09-14 DIAGNOSIS — R11.2 NAUSEA AND VOMITING, UNSPECIFIED VOMITING TYPE: ICD-10-CM

## 2023-09-14 DIAGNOSIS — K80.10 CALCULUS OF GALLBLADDER WITH CHRONIC CHOLECYSTITIS WITHOUT OBSTRUCTION: Primary | ICD-10-CM

## 2023-09-14 PROCEDURE — 76705 ECHO EXAM OF ABDOMEN: CPT

## 2023-09-14 NOTE — TELEPHONE ENCOUNTER
Call patient please.    Her right upper quadrant ultrasound did show signs of cholelithiasis (gallstones).  I think this can be the cause of her GI symptoms.  I recommend a referral to general surgery.  If she is agreeable, I will enter an order.

## 2023-10-04 ENCOUNTER — TELEMEDICINE (OUTPATIENT)
Dept: INTERNAL MEDICINE | Facility: CLINIC | Age: 33
End: 2023-10-04
Payer: MEDICAID

## 2023-10-04 DIAGNOSIS — R10.13 EPIGASTRIC PAIN: ICD-10-CM

## 2023-10-04 DIAGNOSIS — B34.9 VIRAL ILLNESS: Primary | ICD-10-CM

## 2023-10-04 DIAGNOSIS — K80.10 CALCULUS OF GALLBLADDER WITH CHRONIC CHOLECYSTITIS WITHOUT OBSTRUCTION: ICD-10-CM

## 2023-10-04 PROCEDURE — 99213 OFFICE O/P EST LOW 20 MIN: CPT | Performed by: NURSE PRACTITIONER

## 2023-10-04 RX ORDER — FAMOTIDINE 20 MG/1
20 TABLET, FILM COATED ORAL 2 TIMES DAILY
Qty: 60 TABLET | Refills: 0 | Status: SHIPPED | OUTPATIENT
Start: 2023-10-04

## 2023-10-04 RX ORDER — DEXTROMETHORPHAN HYDROBROMIDE AND PROMETHAZINE HYDROCHLORIDE 15; 6.25 MG/5ML; MG/5ML
5 SYRUP ORAL 4 TIMES DAILY PRN
Qty: 60 ML | Refills: 0 | Status: SHIPPED | OUTPATIENT
Start: 2023-10-04

## 2023-10-04 NOTE — PROGRESS NOTES
Patient Name: Martina Hopkins  : 1990   MRN: [id number]     Chief Complaint:      History of Present Illness: Martina Hopkins is a 33 y.o. female presents for telehealth.     You have chosen to receive care through a telehealth visit. Patient consents to video/audio connection for your medical care today.   This visit completed via Twilio.    Patient is home and provider is at [facility] [place] [id number].      The patient is a 33-year-old female who presents today via telehealth visit. Dr. Ventura is her primary care physician.    She has complaints of a virus. She states that her wife, niece and sister-in-law became sick on 10/02/2023, and she became sick on Monday, 10/03/2023. The patient reports that she has stills feels sick. She does think that she has had a fever. The patient is experiencing body aches, cough, mucus and lethargy. She denies shortness of breath, wheezing; however, she has a sore throat, rhinitis, and mild congestion. The patient took 2 rows of an old Medrol Dosepak, which has given her some relief. At night, she takes NyQuil for coughing.     Mrs. 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     She reports that she was supposed to meet with a surgeon for evaluation for a cholecystectomy yesterday, 10/03/2023. She admits that they cannot see her until 10/24/2023, due to her being ill. The patient notes that she could not eat without having to immediately go to the restroom. She admits that she is not doing well as far as that goes. She complains of intermittent abdomen pain. She is staying well hydrated and consuming Pedialyte. The only time she is nauseous is when she has phlegm. Her abdominal pain in located where her epigastric/lower sternum. The patient has been utilizing Aleve and Robitussin. Dr. Ventura had her on a stomach medication which has given her some relief.  Chart  review shows Pepcid.    Abdominal ultrasound   IMPRESSION:  Cholelithiasis with mild gallbladder wall thickening, acute  cholecystitis not excluded.       The patient has a history of vertigo.     Past Medical History:   Diagnosis Date    Constipation     Resolved    Generalized anxiety disorder     History of abnormal cervical Pap smear 07/2013    repeat normal per patient    History of colonoscopy 03/10/2014    normal except rectal irritation (bx neg)    History of dog bite 09/2014    left wrist    History of varicella     PCOS (polycystic ovarian syndrome)     Dx 2014.    PCOS (polycystic ovarian syndrome)     Vitamin D deficiency     Dx 4/17       Subjective     Review of System: Review of Systems   Constitutional:  Positive for chills, fatigue and fever.   HENT:  Positive for congestion, rhinorrhea, sinus pressure and sinus pain.    Respiratory:  Positive for cough.    Musculoskeletal:  Positive for myalgias.      Medications:     Current Outpatient Medications:     famotidine (Pepcid) 20 MG tablet, Take 1 tablet by mouth 2 (Two) Times a Day., Disp: 60 tablet, Rfl: 0    fluticasone (FLONASE) 50 MCG/ACT nasal spray, 2 sprays into the nostril(s) as directed by provider Daily for 7 days., Disp: 9.9 mL, Rfl: 0    meclizine (ANTIVERT) 25 MG tablet, Take 1 tablet by mouth 3 (Three) Times a Day As Needed for Dizziness., Disp: 21 tablet, Rfl: 0    promethazine-dextromethorphan (PROMETHAZINE-DM) 6.25-15 MG/5ML syrup, Take 5 mL by mouth 4 (Four) Times a Day As Needed for Cough., Disp: 60 mL, Rfl: 0    Allergies:   Allergies   Allergen Reactions    Gildess 1.5-30 [Norethindrone-Eth Estradiol] Other (See Comments)     Excessive bleeding    Lexapro [Escitalopram Oxalate] Other (See Comments)     Body tingling and burning feeling  Vomiting excessive crying        Objective     Physical Exam:   Vital Signs: There were no vitals filed for this visit.  There is no height or weight on file to calculate BMI.         Physical  Exam  Constitutional:       Appearance: She is ill-appearing.   Pulmonary:      Effort: Pulmonary effort is normal.   Psychiatric:         Mood and Affect: Mood normal.       Assessment / Plan      Assessment/Plan:   Diagnoses and all orders for this visit:    1. Viral illness (Primary)  -     promethazine-dextromethorphan (PROMETHAZINE-DM) 6.25-15 MG/5ML syrup; Take 5 mL by mouth 4 (Four) Times a Day As Needed for Cough.  Dispense: 60 mL; Refill: 0    2. Calculus of gallbladder with chronic cholecystitis without obstruction    3. Epigastric pain  -     famotidine (Pepcid) 20 MG tablet; Take 1 tablet by mouth 2 (Two) Times a Day.  Dispense: 60 tablet; Refill: 0     1. Viral illness.  -She will complete a home covid test.   - Advised the patient to stay well hydrated with adequate clear yellow urine  - If the patient cannot keep fluids down, vomiting, high fever, and severe pain to go to the ER for evaluation.   - Recommended that patient  continue supportive care and Aleve.   - She will begin promethazine-dextromethorphan as prescribed.    2. Abdominal pain/Calculus of gallbladder .  - Monitor pain.  Discussed red flags and how to follow-up for those.  She will follow-up with surgery at the next scheduled appointment.    3. Epigastric pain.  We will send famotidine to her preferred pharmacy.        Follow Up:     MAGALIS Padilla  [facility]    Transcribed from ambient dictation for MAGALIS Anderson by Delia Petit, Quality .  10/04/23   15:10 EDT    Patient or patient representative verbalized consent to the visit recording.  I have personally performed the services described in this document as transcribed by the above individual, and it is both accurate and complete.

## 2023-10-10 ENCOUNTER — TELEPHONE (OUTPATIENT)
Dept: INTERNAL MEDICINE | Facility: CLINIC | Age: 33
End: 2023-10-10
Payer: MEDICAID

## 2023-10-10 DIAGNOSIS — B34.9 VIRAL ILLNESS: Primary | ICD-10-CM

## 2023-10-10 DIAGNOSIS — J06.9 UPPER RESPIRATORY TRACT INFECTION, UNSPECIFIED TYPE: ICD-10-CM

## 2023-10-10 RX ORDER — METHYLPREDNISOLONE 4 MG/1
TABLET ORAL
Qty: 21 TABLET | Refills: 0 | Status: SHIPPED | OUTPATIENT
Start: 2023-10-10

## 2023-10-10 RX ORDER — BENZONATATE 100 MG/1
100 CAPSULE ORAL 3 TIMES DAILY PRN
Qty: 30 CAPSULE | Refills: 0 | Status: SHIPPED | OUTPATIENT
Start: 2023-10-10

## 2023-10-10 NOTE — TELEPHONE ENCOUNTER
Caller: Martina Hopkins    Relationship: Self    Best call back number: [phone]    What medication are you requesting: STEROID PACK COUGH PEARLS     What are your current symptoms: COUGH CHEST CONGESTION     How long have you been experiencing symptoms: 10/02/2023    Have you had these symptoms before:    [x] Yes  [] No    Have you been treated for these symptoms before:   [x] Yes  [] No    If a prescription is needed, what is your preferred pharmacy and phone number:  [facility]    Additional notes THE PATIENT DID A VIRTUAL APPOINTMENT ON 10/04/2023 SHE STATES THAT HER COUGH HAS NOT GOT ANY BETTER SHE WOULD LIKE TO KNOW IF THE DOCTOR CAN CALL IN THE STEROID PACK AND COUGH PEARLS

## 2023-11-09 ENCOUNTER — LAB REQUISITION (OUTPATIENT)
Dept: LAB | Facility: HOSPITAL | Age: 33
DRG: 395 | End: 2023-11-09
Payer: MEDICAID

## 2023-11-09 DIAGNOSIS — K80.20 CALCULUS OF GALLBLADDER WITHOUT CHOLECYSTITIS WITHOUT OBSTRUCTION: ICD-10-CM

## 2023-11-09 PROCEDURE — 88304 TISSUE EXAM BY PATHOLOGIST: CPT

## 2023-11-12 ENCOUNTER — APPOINTMENT (OUTPATIENT)
Dept: MRI IMAGING | Facility: HOSPITAL | Age: 33
End: 2023-11-12
Payer: MEDICAID

## 2023-11-12 ENCOUNTER — APPOINTMENT (OUTPATIENT)
Dept: CT IMAGING | Facility: HOSPITAL | Age: 33
End: 2023-11-12
Payer: MEDICAID

## 2023-11-12 ENCOUNTER — HOSPITAL ENCOUNTER (EMERGENCY)
Facility: HOSPITAL | Age: 33
Discharge: HOME | End: 2023-11-12
Attending: EMERGENCY MEDICINE | Admitting: EMERGENCY MEDICINE
Payer: MEDICAID

## 2023-11-12 ENCOUNTER — HOSPITAL ENCOUNTER (INPATIENT)
Facility: HOSPITAL | Age: 33
LOS: 4 days | Discharge: HOME | End: 2023-11-16
Attending: EMERGENCY MEDICINE | Admitting: SURGERY
Payer: MEDICAID

## 2023-11-12 VITALS
BODY MASS INDEX: 33.31 KG/M2 | DIASTOLIC BLOOD PRESSURE: 78 MMHG | SYSTOLIC BLOOD PRESSURE: 113 MMHG | HEIGHT: 62 IN | TEMPERATURE: 97.2 F | HEART RATE: 64 BPM | WEIGHT: 181 LBS | OXYGEN SATURATION: 99 % | RESPIRATION RATE: 16 BRPM

## 2023-11-12 DIAGNOSIS — R10.9 POSTOPERATIVE ABDOMINAL PAIN: Primary | ICD-10-CM

## 2023-11-12 DIAGNOSIS — K83.9 BILE LEAK: Primary | ICD-10-CM

## 2023-11-12 DIAGNOSIS — G89.18 POSTOPERATIVE ABDOMINAL PAIN: Primary | ICD-10-CM

## 2023-11-12 DIAGNOSIS — G89.18 ACUTE POST-OPERATIVE PAIN: ICD-10-CM

## 2023-11-12 DIAGNOSIS — K56.7 ILEUS: ICD-10-CM

## 2023-11-12 LAB
ALBUMIN SERPL-MCNC: 4.6 G/DL (ref 3.5–5.2)
ALBUMIN SERPL-MCNC: 4.9 G/DL (ref 3.5–5.2)
ALBUMIN/GLOB SERPL: 1.8 G/DL
ALBUMIN/GLOB SERPL: 1.9 G/DL
ALP SERPL-CCNC: 48 U/L (ref 39–117)
ALP SERPL-CCNC: 63 U/L (ref 39–117)
ALT SERPL W P-5'-P-CCNC: 30 U/L (ref 1–33)
ALT SERPL W P-5'-P-CCNC: 83 U/L (ref 1–33)
ANION GAP SERPL CALCULATED.3IONS-SCNC: 11.4 MMOL/L (ref 5–15)
ANION GAP SERPL CALCULATED.3IONS-SCNC: 12 MMOL/L (ref 5–15)
AST SERPL-CCNC: 22 U/L (ref 1–32)
AST SERPL-CCNC: 88 U/L (ref 1–32)
B-HCG UR QL: NEGATIVE
BASOPHILS # BLD AUTO: 0.02 10*3/MM3 (ref 0–0.2)
BASOPHILS # BLD AUTO: 0.03 10*3/MM3 (ref 0–0.2)
BASOPHILS NFR BLD AUTO: 0.1 % (ref 0–1.5)
BASOPHILS NFR BLD AUTO: 0.2 % (ref 0–1.5)
BILIRUB SERPL-MCNC: 0.4 MG/DL (ref 0–1.2)
BILIRUB SERPL-MCNC: 1 MG/DL (ref 0–1.2)
BILIRUB UR QL STRIP: NEGATIVE
BUN SERPL-MCNC: 5 MG/DL (ref 6–20)
BUN SERPL-MCNC: 7 MG/DL (ref 6–20)
BUN/CREAT SERPL: 6.7 (ref 7–25)
BUN/CREAT SERPL: 8.6 (ref 7–25)
CALCIUM SPEC-SCNC: 11.1 MG/DL (ref 8.6–10.5)
CALCIUM SPEC-SCNC: 9.7 MG/DL (ref 8.6–10.5)
CHLORIDE SERPL-SCNC: 101 MMOL/L (ref 98–107)
CHLORIDE SERPL-SCNC: 98 MMOL/L (ref 98–107)
CLARITY UR: CLEAR
CO2 SERPL-SCNC: 25.6 MMOL/L (ref 22–29)
CO2 SERPL-SCNC: 26 MMOL/L (ref 22–29)
COLOR UR: YELLOW
CREAT SERPL-MCNC: 0.75 MG/DL (ref 0.57–1)
CREAT SERPL-MCNC: 0.81 MG/DL (ref 0.57–1)
DEPRECATED RDW RBC AUTO: 42 FL (ref 37–54)
DEPRECATED RDW RBC AUTO: 42.4 FL (ref 37–54)
EGFRCR SERPLBLD CKD-EPI 2021: 109.3 ML/MIN/1.73
EGFRCR SERPLBLD CKD-EPI 2021: 99.7 ML/MIN/1.73
EOSINOPHIL # BLD AUTO: 0.01 10*3/MM3 (ref 0–0.4)
EOSINOPHIL # BLD AUTO: 0.04 10*3/MM3 (ref 0–0.4)
EOSINOPHIL NFR BLD AUTO: 0.1 % (ref 0.3–6.2)
EOSINOPHIL NFR BLD AUTO: 0.3 % (ref 0.3–6.2)
ERYTHROCYTE [DISTWIDTH] IN BLOOD BY AUTOMATED COUNT: 12.9 % (ref 12.3–15.4)
ERYTHROCYTE [DISTWIDTH] IN BLOOD BY AUTOMATED COUNT: 13 % (ref 12.3–15.4)
GLOBULIN UR ELPH-MCNC: 2.6 GM/DL
GLOBULIN UR ELPH-MCNC: 2.6 GM/DL
GLUCOSE SERPL-MCNC: 139 MG/DL (ref 65–99)
GLUCOSE SERPL-MCNC: 163 MG/DL (ref 65–99)
GLUCOSE UR STRIP-MCNC: NEGATIVE MG/DL
HCT VFR BLD AUTO: 39.4 % (ref 34–46.6)
HCT VFR BLD AUTO: 42 % (ref 34–46.6)
HGB BLD-MCNC: 13.2 G/DL (ref 12–15.9)
HGB BLD-MCNC: 14.4 G/DL (ref 12–15.9)
HGB UR QL STRIP.AUTO: NEGATIVE
HOLD SPECIMEN: NORMAL
IMM GRANULOCYTES # BLD AUTO: 0.05 10*3/MM3 (ref 0–0.05)
IMM GRANULOCYTES # BLD AUTO: 0.07 10*3/MM3 (ref 0–0.05)
IMM GRANULOCYTES NFR BLD AUTO: 0.3 % (ref 0–0.5)
IMM GRANULOCYTES NFR BLD AUTO: 0.5 % (ref 0–0.5)
KETONES UR QL STRIP: ABNORMAL
LEUKOCYTE ESTERASE UR QL STRIP.AUTO: NEGATIVE
LIPASE SERPL-CCNC: 12 U/L (ref 13–60)
LIPASE SERPL-CCNC: 18 U/L (ref 13–60)
LYMPHOCYTES # BLD AUTO: 1.04 10*3/MM3 (ref 0.7–3.1)
LYMPHOCYTES # BLD AUTO: 2.46 10*3/MM3 (ref 0.7–3.1)
LYMPHOCYTES NFR BLD AUTO: 16.4 % (ref 19.6–45.3)
LYMPHOCYTES NFR BLD AUTO: 7 % (ref 19.6–45.3)
MCH RBC QN AUTO: 30.4 PG (ref 26.6–33)
MCH RBC QN AUTO: 30.7 PG (ref 26.6–33)
MCHC RBC AUTO-ENTMCNC: 33.5 G/DL (ref 31.5–35.7)
MCHC RBC AUTO-ENTMCNC: 34.3 G/DL (ref 31.5–35.7)
MCV RBC AUTO: 89.6 FL (ref 79–97)
MCV RBC AUTO: 90.8 FL (ref 79–97)
MONOCYTES # BLD AUTO: 0.74 10*3/MM3 (ref 0.1–0.9)
MONOCYTES # BLD AUTO: 1.1 10*3/MM3 (ref 0.1–0.9)
MONOCYTES NFR BLD AUTO: 4.9 % (ref 5–12)
MONOCYTES NFR BLD AUTO: 7.4 % (ref 5–12)
NEUTROPHILS NFR BLD AUTO: 11.65 10*3/MM3 (ref 1.7–7)
NEUTROPHILS NFR BLD AUTO: 12.62 10*3/MM3 (ref 1.7–7)
NEUTROPHILS NFR BLD AUTO: 77.7 % (ref 42.7–76)
NEUTROPHILS NFR BLD AUTO: 85.1 % (ref 42.7–76)
NITRITE UR QL STRIP: NEGATIVE
NRBC BLD AUTO-RTO: 0 /100 WBC (ref 0–0.2)
NRBC BLD AUTO-RTO: 0 /100 WBC (ref 0–0.2)
PH UR STRIP.AUTO: 6.5 [PH] (ref 5–8)
PLATELET # BLD AUTO: 345 10*3/MM3 (ref 140–450)
PLATELET # BLD AUTO: 371 10*3/MM3 (ref 140–450)
PMV BLD AUTO: 9.2 FL (ref 6–12)
PMV BLD AUTO: 9.2 FL (ref 6–12)
POTASSIUM SERPL-SCNC: 3.6 MMOL/L (ref 3.5–5.2)
POTASSIUM SERPL-SCNC: 3.7 MMOL/L (ref 3.5–5.2)
PROT SERPL-MCNC: 7.2 G/DL (ref 6–8.5)
PROT SERPL-MCNC: 7.5 G/DL (ref 6–8.5)
PROT UR QL STRIP: NEGATIVE
RBC # BLD AUTO: 4.34 10*6/MM3 (ref 3.77–5.28)
RBC # BLD AUTO: 4.69 10*6/MM3 (ref 3.77–5.28)
REF LAB TEST METHOD: NORMAL
SODIUM SERPL-SCNC: 136 MMOL/L (ref 136–145)
SODIUM SERPL-SCNC: 138 MMOL/L (ref 136–145)
SP GR UR STRIP: 1.02 (ref 1–1.03)
UROBILINOGEN UR QL STRIP: ABNORMAL
WBC NRBC COR # BLD: 14.84 10*3/MM3 (ref 3.4–10.8)
WBC NRBC COR # BLD: 14.99 10*3/MM3 (ref 3.4–10.8)
WHOLE BLOOD HOLD COAG: NORMAL
WHOLE BLOOD HOLD COAG: NORMAL
WHOLE BLOOD HOLD SPECIMEN: NORMAL
WHOLE BLOOD HOLD SPECIMEN: NORMAL

## 2023-11-12 PROCEDURE — 83690 ASSAY OF LIPASE: CPT | Performed by: EMERGENCY MEDICINE

## 2023-11-12 PROCEDURE — 85025 COMPLETE CBC W/AUTO DIFF WBC: CPT | Performed by: EMERGENCY MEDICINE

## 2023-11-12 PROCEDURE — G0378 HOSPITAL OBSERVATION PER HR: HCPCS

## 2023-11-12 PROCEDURE — 81025 URINE PREGNANCY TEST: CPT | Performed by: EMERGENCY MEDICINE

## 2023-11-12 PROCEDURE — 25010000002 KETOROLAC TROMETHAMINE PER 15 MG: Performed by: EMERGENCY MEDICINE

## 2023-11-12 PROCEDURE — 25010000002 MORPHINE PER 10 MG: Performed by: EMERGENCY MEDICINE

## 2023-11-12 PROCEDURE — 25010000002 DICYCLOMINE PER 20 MG: Performed by: EMERGENCY MEDICINE

## 2023-11-12 PROCEDURE — 25510000001 IOPAMIDOL 61 % SOLUTION: Performed by: EMERGENCY MEDICINE

## 2023-11-12 PROCEDURE — 99285 EMERGENCY DEPT VISIT HI MDM: CPT

## 2023-11-12 PROCEDURE — 25010000002 ONDANSETRON PER 1 MG: Performed by: EMERGENCY MEDICINE

## 2023-11-12 PROCEDURE — 80053 COMPREHEN METABOLIC PANEL: CPT | Performed by: EMERGENCY MEDICINE

## 2023-11-12 PROCEDURE — 74181 MRI ABDOMEN W/O CONTRAST: CPT

## 2023-11-12 PROCEDURE — 96375 TX/PRO/DX INJ NEW DRUG ADDON: CPT

## 2023-11-12 PROCEDURE — 25810000003 LACTATED RINGERS PER 1000 ML: Performed by: SURGERY

## 2023-11-12 PROCEDURE — 25810000003 SODIUM CHLORIDE 0.9 % SOLUTION: Performed by: EMERGENCY MEDICINE

## 2023-11-12 PROCEDURE — 96374 THER/PROPH/DIAG INJ IV PUSH: CPT

## 2023-11-12 PROCEDURE — 63710000001 ONDANSETRON PER 8 MG: Performed by: SURGERY

## 2023-11-12 PROCEDURE — 81003 URINALYSIS AUTO W/O SCOPE: CPT | Performed by: EMERGENCY MEDICINE

## 2023-11-12 PROCEDURE — 74177 CT ABD & PELVIS W/CONTRAST: CPT

## 2023-11-12 PROCEDURE — 36415 COLL VENOUS BLD VENIPUNCTURE: CPT

## 2023-11-12 RX ORDER — KETOROLAC TROMETHAMINE 10 MG/1
10 TABLET, FILM COATED ORAL EVERY 6 HOURS PRN
Qty: 20 TABLET | Refills: 0 | Status: SHIPPED | OUTPATIENT
Start: 2023-11-12

## 2023-11-12 RX ORDER — OXYCODONE AND ACETAMINOPHEN 5; 325 MG/1; MG/1
2 TABLET ORAL EVERY 4 HOURS PRN
Status: DISCONTINUED | OUTPATIENT
Start: 2023-11-12 | End: 2023-11-16 | Stop reason: HOSPADM

## 2023-11-12 RX ORDER — PANTOPRAZOLE SODIUM 40 MG/1
40 TABLET, DELAYED RELEASE ORAL
Status: DISCONTINUED | OUTPATIENT
Start: 2023-11-13 | End: 2023-11-16 | Stop reason: HOSPADM

## 2023-11-12 RX ORDER — KETOROLAC TROMETHAMINE 30 MG/ML
30 INJECTION, SOLUTION INTRAMUSCULAR; INTRAVENOUS ONCE
Status: COMPLETED | OUTPATIENT
Start: 2023-11-12 | End: 2023-11-12

## 2023-11-12 RX ORDER — ONDANSETRON 2 MG/ML
4 INJECTION INTRAMUSCULAR; INTRAVENOUS EVERY 6 HOURS PRN
Status: DISCONTINUED | OUTPATIENT
Start: 2023-11-12 | End: 2023-11-16 | Stop reason: HOSPADM

## 2023-11-12 RX ORDER — DOCUSATE SODIUM 100 MG/1
100 CAPSULE, LIQUID FILLED ORAL 2 TIMES DAILY
Status: DISCONTINUED | OUTPATIENT
Start: 2023-11-12 | End: 2023-11-12

## 2023-11-12 RX ORDER — OXYCODONE HYDROCHLORIDE AND ACETAMINOPHEN 5; 325 MG/1; MG/1
1 TABLET ORAL
Qty: 12 TABLET | Refills: 0 | Status: SHIPPED | OUTPATIENT
Start: 2023-11-12

## 2023-11-12 RX ORDER — ONDANSETRON 2 MG/ML
4 INJECTION INTRAMUSCULAR; INTRAVENOUS ONCE
Status: COMPLETED | OUTPATIENT
Start: 2023-11-12 | End: 2023-11-12

## 2023-11-12 RX ORDER — SODIUM CHLORIDE 9 MG/ML
10 INJECTION, SOLUTION INTRAMUSCULAR; INTRAVENOUS; SUBCUTANEOUS AS NEEDED
Status: DISCONTINUED | OUTPATIENT
Start: 2023-11-12 | End: 2023-11-16 | Stop reason: HOSPADM

## 2023-11-12 RX ORDER — SODIUM CHLORIDE 0.9 % (FLUSH) 0.9 %
10 SYRINGE (ML) INJECTION AS NEEDED
Status: DISCONTINUED | OUTPATIENT
Start: 2023-11-12 | End: 2023-11-12 | Stop reason: HOSPADM

## 2023-11-12 RX ORDER — HEPARIN SODIUM 5000 [USP'U]/ML
5000 INJECTION, SOLUTION INTRAVENOUS; SUBCUTANEOUS EVERY 8 HOURS SCHEDULED
Status: DISCONTINUED | OUTPATIENT
Start: 2023-11-13 | End: 2023-11-16 | Stop reason: HOSPADM

## 2023-11-12 RX ORDER — DOCUSATE SODIUM 100 MG/1
100 CAPSULE, LIQUID FILLED ORAL 2 TIMES DAILY
Status: DISCONTINUED | OUTPATIENT
Start: 2023-11-12 | End: 2023-11-16 | Stop reason: HOSPADM

## 2023-11-12 RX ORDER — SODIUM CHLORIDE, SODIUM LACTATE, POTASSIUM CHLORIDE, CALCIUM CHLORIDE 600; 310; 30; 20 MG/100ML; MG/100ML; MG/100ML; MG/100ML
50 INJECTION, SOLUTION INTRAVENOUS CONTINUOUS
Status: DISCONTINUED | OUTPATIENT
Start: 2023-11-12 | End: 2023-11-15

## 2023-11-12 RX ORDER — MORPHINE SULFATE 2 MG/ML
2 INJECTION, SOLUTION INTRAMUSCULAR; INTRAVENOUS
Status: DISCONTINUED | OUTPATIENT
Start: 2023-11-12 | End: 2023-11-16 | Stop reason: HOSPADM

## 2023-11-12 RX ORDER — BISACODYL 5 MG/1
10 TABLET, DELAYED RELEASE ORAL DAILY
Status: DISCONTINUED | OUTPATIENT
Start: 2023-11-12 | End: 2023-11-13

## 2023-11-12 RX ORDER — ONDANSETRON 4 MG/1
4 TABLET, FILM COATED ORAL EVERY 6 HOURS PRN
Status: DISCONTINUED | OUTPATIENT
Start: 2023-11-12 | End: 2023-11-16 | Stop reason: HOSPADM

## 2023-11-12 RX ORDER — NALOXONE HCL 0.4 MG/ML
0.4 VIAL (ML) INJECTION
Status: DISCONTINUED | OUTPATIENT
Start: 2023-11-12 | End: 2023-11-16 | Stop reason: HOSPADM

## 2023-11-12 RX ORDER — DICYCLOMINE HYDROCHLORIDE 10 MG/ML
20 INJECTION INTRAMUSCULAR ONCE
Status: COMPLETED | OUTPATIENT
Start: 2023-11-12 | End: 2023-11-12

## 2023-11-12 RX ORDER — BISACODYL 5 MG/1
10 TABLET, DELAYED RELEASE ORAL DAILY
Status: DISCONTINUED | OUTPATIENT
Start: 2023-11-13 | End: 2023-11-12

## 2023-11-12 RX ORDER — KETOROLAC TROMETHAMINE 30 MG/ML
15 INJECTION, SOLUTION INTRAMUSCULAR; INTRAVENOUS ONCE
Status: COMPLETED | OUTPATIENT
Start: 2023-11-12 | End: 2023-11-12

## 2023-11-12 RX ADMIN — ONDANSETRON 4 MG: 2 INJECTION INTRAMUSCULAR; INTRAVENOUS at 02:27

## 2023-11-12 RX ADMIN — SODIUM CHLORIDE 1000 ML: 9 INJECTION, SOLUTION INTRAVENOUS at 16:31

## 2023-11-12 RX ADMIN — KETOROLAC TROMETHAMINE 15 MG: 30 INJECTION, SOLUTION INTRAMUSCULAR; INTRAVENOUS at 17:42

## 2023-11-12 RX ADMIN — MAGNESIUM HYDROXIDE 30 ML: 400 SUSPENSION ORAL at 22:24

## 2023-11-12 RX ADMIN — DOCUSATE SODIUM 100 MG: 100 CAPSULE, LIQUID FILLED ORAL at 20:51

## 2023-11-12 RX ADMIN — SODIUM CHLORIDE 1000 ML: 9 INJECTION, SOLUTION INTRAVENOUS at 02:28

## 2023-11-12 RX ADMIN — OXYCODONE HYDROCHLORIDE AND ACETAMINOPHEN 2 TABLET: 5; 325 TABLET ORAL at 21:07

## 2023-11-12 RX ADMIN — SODIUM CHLORIDE, POTASSIUM CHLORIDE, SODIUM LACTATE AND CALCIUM CHLORIDE 125 ML/HR: 600; 310; 30; 20 INJECTION, SOLUTION INTRAVENOUS at 20:52

## 2023-11-12 RX ADMIN — MORPHINE SULFATE 4 MG: 4 INJECTION, SOLUTION INTRAMUSCULAR; INTRAVENOUS at 02:27

## 2023-11-12 RX ADMIN — DICYCLOMINE HYDROCHLORIDE 20 MG: 10 INJECTION, SOLUTION INTRAMUSCULAR at 16:31

## 2023-11-12 RX ADMIN — IOPAMIDOL 100 ML: 612 INJECTION, SOLUTION INTRAVENOUS at 02:52

## 2023-11-12 RX ADMIN — ONDANSETRON HYDROCHLORIDE 4 MG: 4 TABLET, FILM COATED ORAL at 21:07

## 2023-11-12 RX ADMIN — KETOROLAC TROMETHAMINE 30 MG: 30 INJECTION, SOLUTION INTRAMUSCULAR; INTRAVENOUS at 04:15

## 2023-11-12 NOTE — H&P
General Surgery History and Physical Note    Patient Name:  Martina Hopkins  YOB: 1990  [id number]    Date of Service: 11/12/2023       Patient Care Team:  Taylor Vnetura MD as PCP - Malick Rose MD as Surgeon (General Surgery)      General Surgery History and Physical    Date: 11/12/23    Chief Complaint -abdominal pain status postcholecystectomy    Subjective      Patient is a 33 y.o. female who presents with abdominal pain status postcholecystectomy on November 9.  She is a 33-year-old lady with history of PCOS who is well-known to me after undergoing outpatient elective laparoscopic cholecystectomy 3 days ago on November 9.  She reports that she initially recovered as expected following surgery with abdominal soreness Thursday afternoon and Friday evening.  On postoperative day 2 she began to have some increased abdominal pain.  She describes this as pain mostly in the right upper quadrant but seems to radiate and move throughout her abdomen.  This is crampy.  The pain worsened throughout the day and led her to present to the [facility] emergency department last night.  There she underwent a CT scan as well as labs, these demonstrated only a small amount of fluid postsurgical within the cholecystectomy bed and she was discharged.  She reports that she initially felt better after ER discharge but her symptoms returned today and she had significant abdominal pain.  She describes pain in the right upper quadrant that radiates to the epigastrium and left upper quadrant.  The pain is crampy.  She had 1 episode of emesis this morning and has had some mild nausea.  She has passed flatus only once or twice since surgery and has not had a bowel movement.  She denies fevers or chills.  This is not similar to the pain that she was having before surgery.    Problems Addressed this Visit    None  Visit Diagnoses       Acute post-operative pain    -  Primary    Ileus               Diagnoses         Codes Comments    Acute post-operative pain    -  Primary ICD-10-CM: G89.18  ICD-9-CM: 338.18     Ileus     ICD-10-CM: K56.7  ICD-9-CM: 560.1             PMHx:  Past Medical History:   Diagnosis Date    Constipation     Resolved    Generalized anxiety disorder     History of abnormal cervical Pap smear 07/2013    repeat normal per patient    History of colonoscopy 03/10/2014    normal except rectal irritation (bx neg)    History of dog bite 09/2014    left wrist    History of varicella     PCOS (polycystic ovarian syndrome)     Dx 2014.    PCOS (polycystic ovarian syndrome)     Vitamin D deficiency     Dx 4/17       Past Surgical History:  Past Surgical History:    NO PAST SURGERIES       Allergies:  Allergies   Allergen Reactions    Gildess 1.5-30 [Norethindrone-Eth Estradiol] Other (See Comments)     Excessive bleeding    Lexapro [Escitalopram Oxalate] Other (See Comments)     Body tingling and burning feeling  Vomiting excessive crying        Medications:  Current Facility-Administered Medications on File Prior to Encounter   Medication Dose Route Frequency Provider Last Rate Last Admin    [COMPLETED] iopamidol (ISOVUE-300) 61 % injection 100 mL  100 mL Intravenous Once in imaging Peter Burciaga MD   100 mL at 11/12/23 0252    [COMPLETED] ketorolac (TORADOL) injection 30 mg  30 mg Intravenous Once Peter Burciaga MD   30 mg at 11/12/23 0415    [COMPLETED] morphine injection 4 mg  4 mg Intravenous Once Peter Burciaga MD   4 mg at 11/12/23 0227    [COMPLETED] ondansetron (ZOFRAN) injection 4 mg  4 mg Intravenous Once Peter Burciaga MD   4 mg at 11/12/23 0227    [COMPLETED] sodium chloride 0.9 % bolus 1,000 mL  1,000 mL Intravenous Once Peter Burciaga MD   Stopped at 11/12/23 0258    [DISCONTINUED] sodium chloride 0.9 % flush 10 mL  10 mL Intravenous PRN Peter Burciaga MD         Current Outpatient Medications on File Prior to Encounter    Medication Sig Dispense Refill    benzonatate (Tessalon Perles) 100 MG capsule Take 1 capsule by mouth 3 (Three) Times a Day As Needed for Cough. 30 capsule 0    docusate sodium (Colace) 100 MG capsule Take 1 capsule by mouth 2 (Two) Times a Day. 30 capsule 0    famotidine (Pepcid) 20 MG tablet Take 1 tablet by mouth 2 (Two) Times a Day. 60 tablet 0    fluticasone (FLONASE) 50 MCG/ACT nasal spray 2 sprays into the nostril(s) as directed by provider Daily for 7 days. 9.9 mL 0    ketorolac (TORADOL) 10 MG tablet Take 1 tablet by mouth Every 6 (Six) Hours As Needed for Moderate Pain. 20 tablet 0    meclizine (ANTIVERT) 25 MG tablet Take 1 tablet by mouth 3 (Three) Times a Day As Needed for Dizziness. 21 tablet 0    methylPREDNISolone (MEDROL) 4 MG dose pack Take as directed on package instructions. 21 tablet 0    ondansetron (ZOFRAN) 4 MG tablet Take 1 tablet by mouth Every 6 (Six) Hours As Needed. 12 tablet 0    oxyCODONE-acetaminophen (Percocet) 5-325 MG per tablet Take 1 tablet by mouth Every 4 (Four) to 6 (Six) Hours As Needed for Severe Pain. 12 tablet 0    promethazine-dextromethorphan (PROMETHAZINE-DM) 6.25-15 MG/5ML syrup Take 5 mL by mouth 4 (Four) Times a Day As Needed for Cough. 60 mL 0    [DISCONTINUED] oxyCODONE-acetaminophen (Percocet) 5-325 MG per tablet Take 1 tablet by mouth Every 4 (Four) to 6 (Six) Hours As Needed for pain. 12 tablet 0         Current Facility-Administered Medications:     bisacodyl (DULCOLAX) EC tablet 10 mg, 10 mg, Oral, Daily, Malick Hackett MD    docusate sodium (COLACE) capsule 100 mg, 100 mg, Oral, BID, Malick Hackett MD    magnesium hydroxide (MILK OF MAGNESIA) suspension 10 mL, 10 mL, Oral, BID, Malick Hackett MD    Sodium Chloride (PF) 0.9 % 10 mL, 10 mL, Intravenous, PRN, Archie Calderon MD    Current Outpatient Medications:     benzonatate (Tessalon Perles) 100 MG capsule, Take 1 capsule by mouth 3 (Three) Times a Day As Needed for Cough., Disp: 30  capsule, Rfl: 0    docusate sodium (Colace) 100 MG capsule, Take 1 capsule by mouth 2 (Two) Times a Day., Disp: 30 capsule, Rfl: 0    famotidine (Pepcid) 20 MG tablet, Take 1 tablet by mouth 2 (Two) Times a Day., Disp: 60 tablet, Rfl: 0    fluticasone (FLONASE) 50 MCG/ACT nasal spray, 2 sprays into the nostril(s) as directed by provider Daily for 7 days., Disp: 9.9 mL, Rfl: 0    ketorolac (TORADOL) 10 MG tablet, Take 1 tablet by mouth Every 6 (Six) Hours As Needed for Moderate Pain., Disp: 20 tablet, Rfl: 0    meclizine (ANTIVERT) 25 MG tablet, Take 1 tablet by mouth 3 (Three) Times a Day As Needed for Dizziness., Disp: 21 tablet, Rfl: 0    methylPREDNISolone (MEDROL) 4 MG dose pack, Take as directed on package instructions., Disp: 21 tablet, Rfl: 0    ondansetron (ZOFRAN) 4 MG tablet, Take 1 tablet by mouth Every 6 (Six) Hours As Needed., Disp: 12 tablet, Rfl: 0    oxyCODONE-acetaminophen (Percocet) 5-325 MG per tablet, Take 1 tablet by mouth Every 4 (Four) to 6 (Six) Hours As Needed for Severe Pain., Disp: 12 tablet, Rfl: 0    promethazine-dextromethorphan (PROMETHAZINE-DM) 6.25-15 MG/5ML syrup, Take 5 mL by mouth 4 (Four) Times a Day As Needed for Cough., Disp: 60 mL, Rfl: 0      Family History:  Family History   Problem Relation Age of Onset    Hypothyroidism Sister         due to thyroidectomy    Thyroid cancer Sister     Diabetes Maternal Uncle     Diabetes Maternal Grandmother     Coronary artery disease Maternal Grandfather     Colon cancer Paternal Grandmother     Diabetes Paternal Grandfather        Social History: Pt lives in [facility].    Tobacco use: Denies     EtOH use : Occasional   Illicit drug use: Denies       Review of Systems:   Constitutional: No fevers, chills or malaise   Eyes: Denies visual changes    Cardiovascular: Denies chest pain, palpitations   Pulmonary: Denies cough or shortness of breath   Abdominal/ GI: See HPI    Genitourinary: Denies dysuria or hematuria   Musculoskeletal:  "Denies any but chronic joint aches, pains or deformities   Psychiatric: No recent mood changes   Neurologic: No paresthesias or loss of function    /81 (BP Location: Left arm, Patient Position: Sitting)   Pulse 63   Temp 97.9 °F (36.6 °C) (Oral)   Resp 14   Ht 157.5 cm (62\")   Wt 82.1 kg (181 lb)   LMP 10/11/2023   SpO2 99%   BMI 33.11 kg/m²   Body mass index is 33.11 kg/m².    Gen: Awake, alert, sitting up in bed, no obvious distress but appears generally uncomfortable  Head: Normocephalic, atraumatic.   Eyes: Pupils equal, round, react to light and accommodation.   Mouth: Oral mucosa without lesions,   Neck: No masses, lymphadenopathy or carotid bruits bilaterally   CV: Rhythm and rate regular, no murmurs, rubs or gallops  Lungs: Clear to auscultation bilaterally, not labored on room air   Abdomen: Soft, not obviously distended, incisions are clean dry and intact with only a small amount of ecchymosis surrounding incision sites, there is generalized tenderness to palpation throughout the upper abdomen that is somewhat worse in the right upper quadrant but there is no rebound tenderness or guarding  Groin : No obvious hernias bilaterally   Extremities:  No cyanosis, clubbing or edema bilaterally  Lymphatics: No abnormal lymphadenopathy appreciated   Neurologic: No gross deficits         CBC  Results from last 7 days   Lab Units 11/12/23  1603   WBC 10*3/mm3 14.84*   HEMOGLOBIN g/dL 14.4   HEMATOCRIT % 42.0   PLATELETS 10*3/mm3 371       CMP  Results from last 7 days   Lab Units 11/12/23  1603   SODIUM mmol/L 136   POTASSIUM mmol/L 3.6   CHLORIDE mmol/L 98   CO2 mmol/L 26.0   BUN mg/dL 5*   CREATININE mg/dL 0.75   CALCIUM mg/dL 11.1*   BILIRUBIN mg/dL 1.0   ALK PHOS U/L 63   ALT (SGPT) U/L 83*   AST (SGOT) U/L 88*   GLUCOSE mg/dL 163*       Radiology  Imaging Results (Last 72 Hours)       ** No results found for the last 72 hours. **                Results Review: I have personally reviewed all of the " recent lab and imaging results available at this time.     She is afebrile and vital signs are stable    Labs with only a very slight leukocytosis 14,000.  Hemoglobin is 14.  AST and ALT are very borderline elevated at 88 and 83.  Total bilirubin is normal at 1.0    I have personally reviewed a CT scan of the abdomen and pelvis that was completed last night at [facility].  This demonstrates a small amount of expected postsurgical fluid and air in the cholecystectomy bed with out a well-defined fluid collection.  There is some generalized constipation and a small amount of bowel distention    Assessment:  Mrs. Hopkins is a 33-year-old lady with history of PCOS who is postoperative day 3 following laparoscopic cholecystectomy who presents to the emergency department with recurrent abdominal pain.  Her CT scan from last night does demonstrate a small amount of fluid in the cholecystectomy bed, however this is not well-defined and this is quite a small amount and frankly I think that this is somewhat expected postoperative day 2.  Her labs do not demonstrate significant abnormalities of her liver function tests and notably her total bilirubin is normal at 1.0.  Regardless her abdominal pain is recurrent and has not been well controlled.  I think the best course of action is to plan for admission for pain control, fluid resuscitation, and further work-up.  We will plan to obtain an MRCP tonight and depending on these results and her clinical status can consider HIDA scan tomorrow    Plan:  -Admit to the floor  -N.p.o. for imaging studies  -IV fluid resuscitation  -As needed pain control and antiemetics  -We will start a bowel regimen  -Obtain MRCP tonight      I discussed the patient's findings and my recommendations with the patient and/or family, as well as the primary team     Malick Hackett MD  11/12/23  18:18 EST

## 2023-11-12 NOTE — ED NOTES
[facility] Adm[facility] called at this time to request general surgery consult per provider. Dr. Hackett transferred back to Dr. Burciaga at this time.

## 2023-11-12 NOTE — ED PROVIDER NOTES
HPI: Martina Hopkins is a 33 y.o. female who presents to the emergency department complaining of abdominal pain.  Patient states that 3 days ago she had a laparoscopic cholecystectomy.  Pain has been well controlled until this evening when pain seemed to intensify.  Pain is localized to the right upper quadrant.  She denies any fevers, chills, chest pain.  She has had some nausea, vomiting and constipation.      REVIEW OF SYSTEMS: All other systems reviewed and are negative     PAST MEDICAL HISTORY:   Past Medical History:   Diagnosis Date    Constipation     Resolved    Generalized anxiety disorder     History of abnormal cervical Pap smear 07/2013    repeat normal per patient    History of colonoscopy 03/10/2014    normal except rectal irritation (bx neg)    History of dog bite 09/2014    left wrist    History of varicella     PCOS (polycystic ovarian syndrome)     Dx 2014.    PCOS (polycystic ovarian syndrome)     Vitamin D deficiency     Dx 4/17        FAMILY HISTORY:   Family History   Problem Relation Age of Onset    Hypothyroidism Sister         due to thyroidectomy    Thyroid cancer Sister     Diabetes Maternal Uncle     Diabetes Maternal Grandmother     Coronary artery disease Maternal Grandfather     Colon cancer Paternal Grandmother     Diabetes Paternal Grandfather         SOCIAL HISTORY:   Social History     Socioeconomic History    Marital status:     Number of children: 0   Tobacco Use    Smoking status: Never    Smokeless tobacco: Never   Vaping Use    Vaping Use: Never used   Substance and Sexual Activity    Alcohol use: Yes     Comment: 1 cocktail 3-4 times per week    Drug use: Not Currently     Comment: experimented with multiple drugs age [age]    Sexual activity: Yes     Partners: Female     Birth control/protection: None        SURGICAL HISTORY:   Past Surgical History:   Procedure Laterality Date    NO PAST SURGERIES          ALLERGIES: Gildess 1.5-30 [norethindrone-eth estradiol]  and Lexapro [escitalopram oxalate]       PHYSICAL EXAM:   VITAL SIGNS:   Vitals:    11/12/23 0418   BP: 113/78   Pulse: 64   Resp: 16   Temp:    SpO2: 99%      CONSTITUTIONAL: Awake, well appearing, nontoxic   HENT: Atraumatic, normocephalic, oral mucosa moist, airway patent. Nares patent without drainage. External ears normal.   EYES: Conjunctivae clear, EOMI, PERRL   NECK: Trachea midline, nontender, supple   CARDIOVASCULAR: Normal heart rate, Normal rhythm.  PULMONARY/CHEST: Normal work of breathing. Clear to auscultation, no rhonchi, wheezes, or rales.  ABDOMINAL: Nondistended, soft, mild diffuse tenderness worse in the right upper quadrant, no rebound or guarding.  NEUROLOGIC: Nonfocal, moves all four extremities, no gross sensory or motor deficits.   EXTREMITIES: No clubbing, cyanosis, or edema   SKIN: Warm, Dry, No erythema, No rash       ED COURSE / MEDICAL DECISION MAKING:     Martina Hopkins is a 33 y.o. female who presents to the emergency department for evaluation of abdominal pain post lap jeromy.  Well-developed, well-nourished young female in no distress with exam as above.  Her vital signs are normal.  Her oxygen saturation is normal on room air at 9 9%.  Her exam is notable for some diffuse abdominal tenderness.  Will obtain labs and imaging.  Will give symptomatic treatment.  Disposition pending.    Differential diagnosis includes postoperative complication, constipation, ileus among other etiologies.    Lab work is nonactionable.  CT scan reveals moderate fluid in the gallbladder fossa.  Discussed the case and findings with Dr. Juárez, he was able to review images.  He advised this was expected amount of fluid, if patient's pain can be controlled recommended discharge home with outpatient follow-up.  Patient's pain is well controlled.  She will be discharged home.  We discussed return precautions.  She is happy with this plan.    Final diagnoses:   Postoperative abdominal pain        Peter Burciaga  MD [place]  11/12/23 0458

## 2023-11-13 ENCOUNTER — ANESTHESIA EVENT (OUTPATIENT)
Dept: GASTROENTEROLOGY | Facility: HOSPITAL | Age: 33
End: 2023-11-13
Payer: MEDICAID

## 2023-11-13 ENCOUNTER — APPOINTMENT (OUTPATIENT)
Dept: GENERAL RADIOLOGY | Facility: HOSPITAL | Age: 33
End: 2023-11-13
Payer: MEDICAID

## 2023-11-13 ENCOUNTER — APPOINTMENT (OUTPATIENT)
Dept: NUCLEAR MEDICINE | Facility: HOSPITAL | Age: 33
End: 2023-11-13
Payer: MEDICAID

## 2023-11-13 ENCOUNTER — ANESTHESIA (OUTPATIENT)
Dept: GASTROENTEROLOGY | Facility: HOSPITAL | Age: 33
End: 2023-11-13
Payer: MEDICAID

## 2023-11-13 PROBLEM — K83.9 BILE LEAK: Status: ACTIVE | Noted: 2023-11-12

## 2023-11-13 LAB
ALBUMIN SERPL-MCNC: 4.2 G/DL (ref 3.5–5.2)
ALBUMIN/GLOB SERPL: 1.7 G/DL
ALP SERPL-CCNC: 66 U/L (ref 39–117)
ALT SERPL W P-5'-P-CCNC: 83 U/L (ref 1–33)
ANION GAP SERPL CALCULATED.3IONS-SCNC: 9 MMOL/L (ref 5–15)
AST SERPL-CCNC: 49 U/L (ref 1–32)
BASOPHILS # BLD AUTO: 0.02 10*3/MM3 (ref 0–0.2)
BASOPHILS NFR BLD AUTO: 0.2 % (ref 0–1.5)
BILIRUB SERPL-MCNC: 1.1 MG/DL (ref 0–1.2)
BUN SERPL-MCNC: 5 MG/DL (ref 6–20)
BUN/CREAT SERPL: 7 (ref 7–25)
CALCIUM SPEC-SCNC: 10.5 MG/DL (ref 8.6–10.5)
CHLORIDE SERPL-SCNC: 100 MMOL/L (ref 98–107)
CO2 SERPL-SCNC: 29 MMOL/L (ref 22–29)
CREAT SERPL-MCNC: 0.71 MG/DL (ref 0.57–1)
DEPRECATED RDW RBC AUTO: 42.4 FL (ref 37–54)
EGFRCR SERPLBLD CKD-EPI 2021: 116.7 ML/MIN/1.73
EOSINOPHIL # BLD AUTO: 0.04 10*3/MM3 (ref 0–0.4)
EOSINOPHIL NFR BLD AUTO: 0.4 % (ref 0.3–6.2)
ERYTHROCYTE [DISTWIDTH] IN BLOOD BY AUTOMATED COUNT: 12.9 % (ref 12.3–15.4)
GLOBULIN UR ELPH-MCNC: 2.5 GM/DL
GLUCOSE SERPL-MCNC: 120 MG/DL (ref 65–99)
HCT VFR BLD AUTO: 39.1 % (ref 34–46.6)
HGB BLD-MCNC: 13.3 G/DL (ref 12–15.9)
IMM GRANULOCYTES # BLD AUTO: 0.04 10*3/MM3 (ref 0–0.05)
IMM GRANULOCYTES NFR BLD AUTO: 0.4 % (ref 0–0.5)
LYMPHOCYTES # BLD AUTO: 1.18 10*3/MM3 (ref 0.7–3.1)
LYMPHOCYTES NFR BLD AUTO: 10.7 % (ref 19.6–45.3)
MCH RBC QN AUTO: 30.6 PG (ref 26.6–33)
MCHC RBC AUTO-ENTMCNC: 34 G/DL (ref 31.5–35.7)
MCV RBC AUTO: 90.1 FL (ref 79–97)
MONOCYTES # BLD AUTO: 0.91 10*3/MM3 (ref 0.1–0.9)
MONOCYTES NFR BLD AUTO: 8.3 % (ref 5–12)
NEUTROPHILS NFR BLD AUTO: 8.82 10*3/MM3 (ref 1.7–7)
NEUTROPHILS NFR BLD AUTO: 80 % (ref 42.7–76)
NRBC BLD AUTO-RTO: 0 /100 WBC (ref 0–0.2)
PLATELET # BLD AUTO: 333 10*3/MM3 (ref 140–450)
PMV BLD AUTO: 9.4 FL (ref 6–12)
POTASSIUM SERPL-SCNC: 3.7 MMOL/L (ref 3.5–5.2)
PROT SERPL-MCNC: 6.7 G/DL (ref 6–8.5)
RBC # BLD AUTO: 4.34 10*6/MM3 (ref 3.77–5.28)
SODIUM SERPL-SCNC: 138 MMOL/L (ref 136–145)
WBC NRBC COR # BLD: 11.01 10*3/MM3 (ref 3.4–10.8)

## 2023-11-13 PROCEDURE — G0378 HOSPITAL OBSERVATION PER HR: HCPCS

## 2023-11-13 PROCEDURE — 78226 HEPATOBILIARY SYSTEM IMAGING: CPT

## 2023-11-13 PROCEDURE — 85025 COMPLETE CBC W/AUTO DIFF WBC: CPT | Performed by: SURGERY

## 2023-11-13 PROCEDURE — A9537 TC99M MEBROFENIN: HCPCS | Performed by: SURGERY

## 2023-11-13 PROCEDURE — 25010000002 METHYLNALTREXONE 12 MG/0.6ML SOLUTION: Performed by: NURSE PRACTITIONER

## 2023-11-13 PROCEDURE — 25010000002 HEPARIN (PORCINE) PER 1000 UNITS: Performed by: SURGERY

## 2023-11-13 PROCEDURE — 25810000003 LACTATED RINGERS PER 1000 ML: Performed by: SURGERY

## 2023-11-13 PROCEDURE — 25010000002 MORPHINE PER 10 MG: Performed by: SURGERY

## 2023-11-13 PROCEDURE — 25010000002 PIPERACILLIN SOD-TAZOBACTAM PER 1 G: Performed by: SURGERY

## 2023-11-13 PROCEDURE — 80053 COMPREHEN METABOLIC PANEL: CPT | Performed by: SURGERY

## 2023-11-13 PROCEDURE — 99223 1ST HOSP IP/OBS HIGH 75: CPT | Performed by: NURSE PRACTITIONER

## 2023-11-13 PROCEDURE — 0 TECHNETIUM TC 99M MEBROFENIN KIT: Performed by: SURGERY

## 2023-11-13 PROCEDURE — 25010000002 MORPHINE PER 10 MG

## 2023-11-13 PROCEDURE — 25010000002 ONDANSETRON PER 1 MG: Performed by: SURGERY

## 2023-11-13 PROCEDURE — 25010000002 KETOROLAC TROMETHAMINE PER 15 MG: Performed by: SURGERY

## 2023-11-13 RX ORDER — MORPHINE SULFATE 2 MG/ML
INJECTION, SOLUTION INTRAMUSCULAR; INTRAVENOUS
Status: COMPLETED
Start: 2023-11-13 | End: 2023-11-13

## 2023-11-13 RX ORDER — BISACODYL 10 MG
10 SUPPOSITORY, RECTAL RECTAL DAILY
Status: DISCONTINUED | OUTPATIENT
Start: 2023-11-13 | End: 2023-11-16

## 2023-11-13 RX ORDER — KETOROLAC TROMETHAMINE 30 MG/ML
30 INJECTION, SOLUTION INTRAMUSCULAR; INTRAVENOUS EVERY 6 HOURS
Status: COMPLETED | OUTPATIENT
Start: 2023-11-13 | End: 2023-11-14

## 2023-11-13 RX ORDER — FAMOTIDINE 20 MG/1
20 TABLET, FILM COATED ORAL ONCE
Status: CANCELLED | OUTPATIENT
Start: 2023-11-13 | End: 2023-11-13

## 2023-11-13 RX ORDER — POLYETHYLENE GLYCOL 3350 17 G/17G
17 POWDER, FOR SOLUTION ORAL DAILY
Status: DISCONTINUED | OUTPATIENT
Start: 2023-11-13 | End: 2023-11-16 | Stop reason: HOSPADM

## 2023-11-13 RX ORDER — KIT FOR THE PREPARATION OF TECHNETIUM TC 99M MEBROFENIN 45 MG/10ML
1 INJECTION, POWDER, LYOPHILIZED, FOR SOLUTION INTRAVENOUS
Status: COMPLETED | OUTPATIENT
Start: 2023-11-13 | End: 2023-11-13

## 2023-11-13 RX ADMIN — ONDANSETRON 4 MG: 2 INJECTION INTRAMUSCULAR; INTRAVENOUS at 18:50

## 2023-11-13 RX ADMIN — PIPERACILLIN SODIUM AND TAZOBACTAM SODIUM 3.38 G: 3; .375 INJECTION, SOLUTION INTRAVENOUS at 21:47

## 2023-11-13 RX ADMIN — MORPHINE SULFATE 2 MG: 2 INJECTION, SOLUTION INTRAMUSCULAR; INTRAVENOUS at 00:17

## 2023-11-13 RX ADMIN — SODIUM CHLORIDE, POTASSIUM CHLORIDE, SODIUM LACTATE AND CALCIUM CHLORIDE 125 ML/HR: 600; 310; 30; 20 INJECTION, SOLUTION INTRAVENOUS at 22:14

## 2023-11-13 RX ADMIN — KETOROLAC TROMETHAMINE 30 MG: 30 INJECTION, SOLUTION INTRAMUSCULAR; INTRAVENOUS at 15:14

## 2023-11-13 RX ADMIN — MORPHINE SULFATE 2 MG: 2 INJECTION, SOLUTION INTRAMUSCULAR; INTRAVENOUS at 15:14

## 2023-11-13 RX ADMIN — BISACODYL 10 MG: 10 SUPPOSITORY RECTAL at 08:30

## 2023-11-13 RX ADMIN — HEPARIN SODIUM 5000 UNITS: 5000 INJECTION INTRAVENOUS; SUBCUTANEOUS at 06:14

## 2023-11-13 RX ADMIN — PIPERACILLIN SODIUM AND TAZOBACTAM SODIUM 3.38 G: 3; .375 INJECTION, SOLUTION INTRAVENOUS at 16:19

## 2023-11-13 RX ADMIN — MORPHINE SULFATE 2 MG: 2 INJECTION, SOLUTION INTRAMUSCULAR; INTRAVENOUS at 11:15

## 2023-11-13 RX ADMIN — MORPHINE SULFATE 2 MG: 2 INJECTION, SOLUTION INTRAMUSCULAR; INTRAVENOUS at 18:50

## 2023-11-13 RX ADMIN — MEBROFENIN 1 DOSE: 45 INJECTION, POWDER, LYOPHILIZED, FOR SOLUTION INTRAVENOUS at 11:08

## 2023-11-13 RX ADMIN — POLYETHYLENE GLYCOL 3350 17 G: 17 POWDER, FOR SOLUTION ORAL at 16:30

## 2023-11-13 RX ADMIN — HEPARIN SODIUM 5000 UNITS: 5000 INJECTION INTRAVENOUS; SUBCUTANEOUS at 21:45

## 2023-11-13 RX ADMIN — SODIUM CHLORIDE, POTASSIUM CHLORIDE, SODIUM LACTATE AND CALCIUM CHLORIDE 125 ML/HR: 600; 310; 30; 20 INJECTION, SOLUTION INTRAVENOUS at 05:04

## 2023-11-13 RX ADMIN — OXYCODONE HYDROCHLORIDE AND ACETAMINOPHEN 2 TABLET: 5; 325 TABLET ORAL at 02:22

## 2023-11-13 RX ADMIN — OXYCODONE HYDROCHLORIDE AND ACETAMINOPHEN 2 TABLET: 5; 325 TABLET ORAL at 12:46

## 2023-11-13 RX ADMIN — METHYLNALTREXONE BROMIDE 8 MG: 12 INJECTION, SOLUTION SUBCUTANEOUS at 21:47

## 2023-11-13 RX ADMIN — DOCUSATE SODIUM 100 MG: 100 CAPSULE, LIQUID FILLED ORAL at 21:45

## 2023-11-13 RX ADMIN — OXYCODONE HYDROCHLORIDE AND ACETAMINOPHEN 2 TABLET: 5; 325 TABLET ORAL at 06:14

## 2023-11-13 RX ADMIN — MORPHINE SULFATE 2 MG: 2 INJECTION, SOLUTION INTRAMUSCULAR; INTRAVENOUS at 08:30

## 2023-11-13 RX ADMIN — KETOROLAC TROMETHAMINE 30 MG: 30 INJECTION, SOLUTION INTRAMUSCULAR; INTRAVENOUS at 21:45

## 2023-11-13 RX ADMIN — KETOROLAC TROMETHAMINE 30 MG: 30 INJECTION, SOLUTION INTRAMUSCULAR; INTRAVENOUS at 08:30

## 2023-11-13 RX ADMIN — OXYCODONE HYDROCHLORIDE AND ACETAMINOPHEN 2 TABLET: 5; 325 TABLET ORAL at 16:51

## 2023-11-13 RX ADMIN — MAGNESIUM HYDROXIDE 30 ML: 400 SUSPENSION ORAL at 21:46

## 2023-11-13 RX ADMIN — PANTOPRAZOLE SODIUM 40 MG: 40 TABLET, DELAYED RELEASE ORAL at 06:15

## 2023-11-13 NOTE — CONSULTS
[facility] [place] [facility] Consult    Referring Provider: No ref. provider found    PCP: Taylor Ventura MD    Reason for Consultation: Bile leak, needs ERCP    Chief complaint: Abdominal pain    History of present illness:    Martina Hopkins is a 33 y.o. female who is admitted with worsening right upper quadrant abdominal pain with associated nausea.  Patient is 4 days postop from a laparoscopic cholecystectomy per Dr. Hackett; notes feeling okay initially but has had worsening pain for the past 2 days with associated nausea and inability maintaining oral intake.  As symptoms persisted, patient presented to local ER with unremarkable findings prompting her to seek medical attention back here at this facility where her general surgeon is.    At time of exam, reports ongoing issues with abdominal pain requiring as needed narcotic medications for control.  Still with nausea but no vomiting.  Reports constipation following her surgical procedure.  No shortness of breath, chest pain, or fever/chills.  Work-up at time of admission reviewed; liver enzymes mildly elevated as would expect following a cholecystectomy-HIDA scan concerning for bile leak. Past medical, surgical, social, and family histories are reviewed for accuracy.  No documented alleviating or exacerbating factors.  Does not endorse pain at time of exam.    Allergies:  Gildess 1.5-30 [norethindrone-eth estradiol] and Lexapro [escitalopram oxalate]    Scheduled Meds:  bisacodyl, 10 mg, Rectal, Daily  docusate sodium, 100 mg, Oral, BID  heparin (porcine), 5,000 Units, Subcutaneous, Q8H  ketorolac, 30 mg, Intravenous, Q6H  pantoprazole, 40 mg, Oral, Q AM  piperacillin-tazobactam, 3.375 g, Intravenous, Q8H  polyethylene glycol, 17 g, Oral, Daily         Infusions:  lactated ringers, 125 mL/hr, Last Rate: 125 mL/hr (11/13/23 5012)        PRN Meds:    Morphine **AND** naloxone    ondansetron **OR** ondansetron    oxyCODONE-acetaminophen    Sodium Chloride (PF)    Home  Called pt and left message  regarding results as stated below. Sent results as an E-advise.   Meds:  Medications Prior to Admission   Medication Sig Dispense Refill Last Dose    benzonatate (Tessalon Perles) 100 MG capsule Take 1 capsule by mouth 3 (Three) Times a Day As Needed for Cough. 30 capsule 0     docusate sodium (Colace) 100 MG capsule Take 1 capsule by mouth 2 (Two) Times a Day. 30 capsule 0     famotidine (Pepcid) 20 MG tablet Take 1 tablet by mouth 2 (Two) Times a Day. 60 tablet 0     fluticasone (FLONASE) 50 MCG/ACT nasal spray 2 sprays into the nostril(s) as directed by provider Daily for 7 days. 9.9 mL 0     ketorolac (TORADOL) 10 MG tablet Take 1 tablet by mouth Every 6 (Six) Hours As Needed for Moderate Pain. 20 tablet 0     meclizine (ANTIVERT) 25 MG tablet Take 1 tablet by mouth 3 (Three) Times a Day As Needed for Dizziness. 21 tablet 0     methylPREDNISolone (MEDROL) 4 MG dose pack Take as directed on package instructions. 21 tablet 0     ondansetron (ZOFRAN) 4 MG tablet Take 1 tablet by mouth Every 6 (Six) Hours As Needed. 12 tablet 0     oxyCODONE-acetaminophen (Percocet) 5-325 MG per tablet Take 1 tablet by mouth Every 4 (Four) to 6 (Six) Hours As Needed for Severe Pain. 12 tablet 0     promethazine-dextromethorphan (PROMETHAZINE-DM) 6.25-15 MG/5ML syrup Take 5 mL by mouth 4 (Four) Times a Day As Needed for Cough. 60 mL 0        ROS: Review of Systems   Constitutional:  Positive for activity change and appetite change. Negative for chills, diaphoresis, fatigue, fever and unexpected weight change.   HENT:  Negative for sore throat, trouble swallowing and voice change.    Eyes: Negative.    Respiratory:  Negative for apnea, cough, choking, chest tightness, shortness of breath, wheezing and stridor.    Cardiovascular:  Negative for chest pain, palpitations and leg swelling.   Gastrointestinal:  Positive for abdominal pain, nausea and vomiting. Negative for abdominal distention, anal bleeding, blood in stool, constipation, diarrhea and rectal pain.   Endocrine: Negative.    Genitourinary:  "Negative.    Musculoskeletal: Negative.    Skin: Negative.    Allergic/Immunologic: Negative.    Neurological: Negative.    Hematological: Negative.    Psychiatric/Behavioral: Negative.     All other systems reviewed and are negative.      PAST MED HX:  Past Medical History:   Diagnosis Date    Constipation     Resolved    Generalized anxiety disorder     History of abnormal cervical Pap smear 07/2013    repeat normal per patient    History of colonoscopy 03/10/2014    normal except rectal irritation (bx neg)    History of dog bite 09/2014    left wrist    History of varicella     PCOS (polycystic ovarian syndrome)     Dx 2014.    PCOS (polycystic ovarian syndrome)     Vitamin D deficiency     Dx 4/17       PAST SURG HX:  Past Surgical History:   Procedure Laterality Date    NO PAST SURGERIES         FAM HX:  Family History   Problem Relation Age of Onset    Hypothyroidism Sister         due to thyroidectomy    Thyroid cancer Sister     Diabetes Maternal Uncle     Diabetes Maternal Grandmother     Coronary artery disease Maternal Grandfather     Colon cancer Paternal Grandmother     Diabetes Paternal Grandfather        SOC HX:  Social History     Socioeconomic History    Marital status:     Number of children: 0   Tobacco Use    Smoking status: Never    Smokeless tobacco: Never   Vaping Use    Vaping Use: Never used   Substance and Sexual Activity    Alcohol use: Yes     Comment: 1 cocktail 3-4 times per week    Drug use: Not Currently     Comment: experimented with multiple drugs age [age]    Sexual activity: Yes     Partners: Female     Birth control/protection: None       PHYSICAL EXAM  /79 (BP Location: Left arm, Patient Position: Lying)   Pulse 69   Temp 98 °F (36.7 °C) (Oral)   Resp 18   Ht 157.5 cm (62\")   Wt 82.1 kg (181 lb)   LMP 10/11/2023   SpO2 99%   BMI 33.11 kg/m²   Wt Readings from Last 3 Encounters:   11/12/23 82.1 kg (181 lb)   11/12/23 82.1 kg (181 lb)   09/13/23 82.1 kg (181 " lb)   ,body mass index is 33.11 kg/m².  Physical Exam  Vitals and nursing note reviewed.   Constitutional:       General: She is not in acute distress.     Appearance: Normal appearance. She is normal weight. She is not ill-appearing or toxic-appearing.   HENT:      Head: Normocephalic and atraumatic.   Eyes:      General: No scleral icterus.     Extraocular Movements: Extraocular movements intact.      Conjunctiva/sclera: Conjunctivae normal.      Pupils: Pupils are equal, round, and reactive to light.   Cardiovascular:      Rate and Rhythm: Normal rate and regular rhythm.      Pulses: Normal pulses.      Heart sounds: Normal heart sounds.   Pulmonary:      Effort: Pulmonary effort is normal. No respiratory distress.      Breath sounds: Normal breath sounds.   Abdominal:      General: Abdomen is flat. Bowel sounds are normal. There is no distension.      Palpations: Abdomen is soft. There is no mass.      Tenderness: There is abdominal tenderness. There is no guarding or rebound.      Hernia: No hernia is present.      Comments: Laparoscopic incision appreciated on abdomen   Musculoskeletal:         General: Normal range of motion.      Right lower leg: No edema.      Left lower leg: No edema.   Skin:     General: Skin is warm and dry.      Capillary Refill: Capillary refill takes less than 2 seconds.      Coloration: Skin is pale. Skin is not jaundiced.   Neurological:      General: No focal deficit present.      Mental Status: She is alert and oriented to person, place, and time.   Psychiatric:         Mood and Affect: Mood normal.         Behavior: Behavior normal.         Thought Content: Thought content normal.         Judgment: Judgment normal.       Note, physical exam completed by MAGALIS Carter Student under my direct supervision.     Results Review:   I reviewed the patient's new clinical results.  I reviewed the patient's new imaging results and agree with the interpretation.  I reviewed the  "patient's other test results and agree with the interpretation  I personally viewed and interpreted the patient's EKG/Telemetry data    Lab Results   Component Value Date    WBC 11.01 (H) 11/13/2023    HGB 13.3 11/13/2023    HGB 14.4 11/12/2023    HGB 13.2 11/12/2023    HCT 39.1 11/13/2023    MCV 90.1 11/13/2023     11/13/2023       No results found for: \"INR\"    Lab Results   Component Value Date    GLUCOSE 120 (H) 11/13/2023    BUN 5 (L) 11/13/2023    CREATININE 0.71 11/13/2023    EGFRIFNONA 102 04/26/2018    BCR 7.0 11/13/2023     11/13/2023    K 3.7 11/13/2023    CO2 29.0 11/13/2023    CALCIUM 10.5 11/13/2023    ALBUMIN 4.2 11/13/2023    ALKPHOS 66 11/13/2023    BILITOT 1.1 11/13/2023    ALT 83 (H) 11/13/2023    AST 49 (H) 11/13/2023       NM HIDA SCAN WITHOUT PHARMACOLOGICAL INTERVENTION    Result Date: 11/13/2023  DATE OF EXAM: 11/13/2023 11:07 AM EST PROCEDURE: NM HIDA SCAN WITHOUT PHARMACOLOGICAL INTERVENTION INDICATIONS: Abdominal pain, upper, recurrent, post cholecystectomy postop day 4 lap jeromy, abdominal pain and small fluid collection at GB fossa COMPARISON: No comparisons available. TECHNIQUE: [Contrast Amount] mCi of technetium 99m tagged Choletec was administered intravenously. Imaging was obtained per protocol. Gallbladder stimulation was not performed. FINDINGS: There is good hepatic extraction of the radiopharmaceutical. Activity is seen in the common bile duct by 10 minutes. There is a rounded collection of activity in the region of the gallbladder fossa which persist on all images and increases over time. Additionally there is abnormal activity along the inferior margin of the right lobe of the liver extending towards the right paracolic gutter which is suspicious for bile leak. There is some activity seen within the small bowel and likely a small amount of reflux of activity into the stomach.     1. Abnormal activity seen along the inferior margin of the right lobe of the liver " extending towards right paracolic gutter suspicious for bile leak. Additionally there is an increasing rounded amount of activity in the region of the gallbladder fossa which could potentially be related to biloma. Electronically Signed: Art Hinds MD  11/13/2023 12:58 PM EST  Workstation ID: [id number]    MRI abdomen wo contrast mrcp    Result Date: 11/12/2023  MRI ABDOMEN WO CONTRAST MRCP Date of Exam: 11/12/2023 7:03 PM EST Indication: Biliary obstruction suspected (Ped 0-17y) postop day 3 lap chlole, intractable abdominal pain, slight transaminase elevation, eval for retained stone.  Comparison: CT of the abdomen and pelvis dated 11/12/2023 Technique:  Routine multiplanar/multisequence images of the abdomen were obtained with MRCP sequences without contrast administration. Findings: The liver is unremarkable in morphology and signal intensity of the obtained sequences. No focal liver lesion is seen. No biliary dilation is seen. The CBD measures approximately 6 mm at the level of the phong hepatis. No intraductal filling defect is seen to suggest choledocholithiasis. The gallbladder is absent. Limited visualization of the pancreas is grossly unremarkable. The spleen, adrenal glands, and kidneys are grossly unremarkable. No hydronephrosis is seen.  There is mild perihepatic and perisplenic fluid. Mild fluid in the gallbladder fossa. Visualized spinal structures demonstrate no acute abnormality.     Impression: 1.Status post cholecystectomy. No biliary dilation or intraductal filling defects identified to suggest choledocholithiasis. Please correlate with serum bilirubin levels. 2.There is mild perihepatic and perisplenic fluid. Mild fluid in the gallbladder fossa. Electronically Signed: Dillon Figueredo MD  11/12/2023 7:29 PM EST  Workstation ID: [id number]    CT Abdomen Pelvis With Contrast    Result Date: 11/12/2023  FINAL REPORT TECHNIQUE: null CLINICAL HISTORY: lap jeromy 3 days ago, increaing RUQ pain,  constipation, nausea COMPARISON: null FINDINGS: Exam: CT abdomen and pelvis with IV contrast. Comparison: None Findings: No free air. No solid liver mass. Cholecystectomy. Moderate volume fluid in the surgical bed. No defined collection to suggest abscess. Small volume abdominal and pelvic ascites. No clinically significant biliary ductal dilation. No splenomegaly or suspicious splenic lesions. No solid or cystic pancreatic mass. No pancreatic ductal dilation. No acute inflammatory changes. Adrenal glands without nodule. No suspicious renal mass. No hydronephrosis. No significant stranding. Bladder without acute pathology. Dense colonic fecal retention without obstruction. Mild fluid distention of scattered small bowel loops without transition, likely indicative of mild postoperative ileus. No evidence for acute appendicitis. No adenopathy. No abdominal aortic aneurysm. No suspicious pelvic masses. No acute soft tissue pathology. No acute osseous pathology.     IMPRESSION: Moderate fluid in the cholecystectomy bed without defined collection. This finding is non-specific given only post-operative day three and fluid may simply be post-operative. Consider HIDA scan correlation to exclude biliary leak. Dense colonic fecal retention without obstruction. Mild fluid distention of scattered small bowel loops without transition, likely indicative of mild postoperative ileus. Authenticated and Electronically Signed by Xochilt Baer MD on 11/12/2023 03:34:24 AM    COVID19   Date Value Ref Range Status   12/02/2022 Not Detected  Final     ASSESSMENTS/PLANS  1.  Postop bile leak  2.  Intractable right upper quadrant abdominal pain, related to above  3.  Non-intractable nausea/vomiting, related to above  4.  S/p laparoscopic cholecystectomy, POD 4  5.  Constipation, drug-induced    Martina DEMARCUS Hopkins is a 33 y.o. female who presents to hospital with worsening abdominal pain with associated nausea; patient is 4 days postop from a  laparoscopic cholecystectomy.  HIDA scan films reviewed and are concerning for postop bile leak.  Given this finding, recommend ERCP with stent deployment; unfortunately, no anesthesia available to accomplish this today-we will plan for first thing tomorrow morning.    >>> N.p.o. at midnight  >>> Obtain informed consent for endoscopic retrograde cholangiopancreatography with biliary stent appointment  >>> Continue antiemetics and pain control measures  >>> Okay for liquids tonight and n.p.o. at midnight  >>> IV Zosyn 3.375 g for intra-abdominal infection prophylaxis  >>> GI prophylaxis PPI  >>> Given patient's constipation following her recent anesthesia exposure and ongoing use of narcotics, will give  Methylnaltrexone 8 mg subcu x1  Milk of mag x1  Continue daily docusate 100 mg p.o.    I discussed the patient's findings and my recommendations with patient, family, nursing staff, and consulting provider    Donte Bird, APRN  11/13/23  17:25 EST

## 2023-11-13 NOTE — CASE MANAGEMENT/SOCIAL WORK
Discharge Planning Assessment  [facility]     Patient Name: Martina Hopkins  MRN: [id number]  Today's Date: 11/13/2023    Admit Date: 11/12/2023    Plan: home   Discharge Needs Assessment       Row Name 11/13/23 0834       Living Environment    People in Home spouse    Name(s) of People in Home Thaddeus badillo    Current Living Arrangements home    Primary Care Provided by self       Transition Planning    Patient/Family Anticipates Transition to home with family       Discharge Needs Assessment    Readmission Within the Last 30 Days no previous admission in last 30 days    Equipment Currently Used at Home none    Concerns to be Addressed discharge planning;basic needs                   Discharge Plan       Row Name 11/13/23 0834       Plan    Plan home    Patient/Family in Agreement with Plan yes    Plan Comments I met with this patient at the bedside. She lives with her spouse in [facility]. She is indpendent with activities of daily living and mobility. She anticipates returning home after this hospitalization and her family can transport. Case management will continue to follow her plan of care and assist with any discharge planning needs.    Final Discharge Disposition Code 01 - home or self-care                  Continued Care and Services - Admitted Since 11/12/2023    Coordination has not been started for this encounter.       Expected Discharge Date and Time       Expected Discharge Date Expected Discharge Time    Nov 17, 2023            Demographic Summary       Row Name 11/13/23 0833       General Information    General Information Comments I confirmed that Taylor Ventura is Ms Hopkins's PCP and she has [place] [employer] insurance                   Functional Status       Row Name 11/13/23 0834       Functional Status, IADL    Medications independent    Meal Preparation independent    Housekeeping independent    Laundry independent    Shopping independent                   Psychosocial    No documentation.                   Abuse/Neglect    No documentation.                  Legal    No documentation.                  Substance Abuse    No documentation.                  Patient Forms    No documentation.                     Lina Otto RN

## 2023-11-13 NOTE — PLAN OF CARE
Problem: Adult Inpatient Plan of Care  Goal: Plan of Care Review  Outcome: Ongoing, Progressing  Goal: Patient-Specific Goal (Individualized)  Outcome: Ongoing, Progressing  Goal: Absence of Hospital-Acquired Illness or Injury  Outcome: Ongoing, Progressing  Intervention: Identify and Manage Fall Risk  Recent Flowsheet Documentation  Taken 11/13/2023 1651 by Galina Dawson RN  Safety Promotion/Fall Prevention:   activity supervised   assistive device/personal items within reach   clutter free environment maintained   room organization consistent   safety round/check completed  Taken 11/13/2023 1200 by Galina Dawson RN  Safety Promotion/Fall Prevention: patient off unit  Taken 11/13/2023 0830 by Galina Dawson RN  Safety Promotion/Fall Prevention:   activity supervised   assistive device/personal items within reach   clutter free environment maintained  Intervention: Prevent Skin Injury  Recent Flowsheet Documentation  Taken 11/13/2023 1651 by Galina Dawson RN  Body Position: position changed independently  Skin Protection:   adhesive use limited   incontinence pads utilized   transparent dressing maintained   tubing/devices free from skin contact  Taken 11/13/2023 0830 by Galina Dawson RN  Body Position: sitting up in bed  Skin Protection:   adhesive use limited   transparent dressing maintained   tubing/devices free from skin contact  Intervention: Prevent and Manage VTE (Venous Thromboembolism) Risk  Recent Flowsheet Documentation  Taken 11/13/2023 1651 by Galina Dawson RN  Activity Management: up ad dayday  Taken 11/13/2023 0830 by Galina Dawson RN  Activity Management: up ad dayday  Goal: Optimal Comfort and Wellbeing  Outcome: Ongoing, Progressing  Intervention: Provide Person-Centered Care  Recent Flowsheet Documentation  Taken 11/13/2023 0830 by Galina Dawson RN  Trust Relationship/Rapport:   care explained   choices provided  Goal: Readiness for Transition of Care  Outcome: Ongoing,  Progressing   Goal Outcome Evaluation:

## 2023-11-13 NOTE — PROGRESS NOTES
"Patient Name:  Martina Hopkins  YOB: 1990  [id number]    Surgery Progress Note    Date of visit: 11/13/2023      Subjective: Persistent abdominal pain overnight.  She reports some nausea and one episode of dry heaves.  No fevers.  No flatus or bowel movement overnight despite taking milk of mag.  Reports the pain is located mostly in the right upper quadrant.          Objective:     /79 (BP Location: Left arm, Patient Position: Lying)   Pulse 69   Temp 98 °F (36.7 °C) (Oral)   Resp 18   Ht 157.5 cm (62\")   Wt 82.1 kg (181 lb)   LMP 10/11/2023   SpO2 99%   BMI 33.11 kg/m²     Intake/Output Summary (Last 24 hours) at 11/13/2023 1223  Last data filed at 11/13/2023 0600  Gross per 24 hour   Intake 2000 ml   Output --   Net 2000 ml       GEN:   Awake, alert, in no acute distress, resting comfortably in bed   CV:   Regular rate and rhythm  L:  Symmetric expansion, not labored on room air  Abd:  Soft, generalized nonfocal tenderness palpation throughout the epigastrium and right upper quadrant, no rebound or guarding, not obviously distended, incisions are clean dry and intact with only a small amount of ecchymosis  Ext:  No cyanosis, clubbing, or edema    Recent labs that are back at this time have been reviewed.           Assessment/ Plan:    Mrs. Hopkins is a 33-year-old lady with history of PCOS who is postoperative day 3 following laparoscopic cholecystectomy who presents to the emergency department with recurrent abdominal pain.     #Intractable abdominal pain status postcholecystectomy  -Postoperative day 4 following lap jeromy  -Vital signs are stable  -Repeat labs without significant abnormality.  White blood cell count is 11.  Total bili is normal at 1.1.  Transaminases are very mildly elevated at 49/83  -MRCP was completed last night.  This demonstrates no obvious retained stone or other abnormality  -We will plan to obtain a HIDA scan today   -We will escalate bowel regimen as she has " not had bowel movement since surgery and does describe quite a bit of bloating.  Suppository today      Malick Hackett MD  11/13/2023  12:23 EST

## 2023-11-13 NOTE — ED PROVIDER NOTES
Subjective   History of Present Illness    Pt presents for abdominal pain.  She had CCY three days ago.  She has had pain since but yesterday had increase. Diffuse pain, mostly dull but sometimes spikes sharp.  Radiates to the L shoulder. No fever.  She went to ED at [facility] last night/this morning for same and had CT imaging, labs and as given meds.  She was discharged home but the pain has returned and is not controlled so she came up here.  She says since that ED visit she has vomited twice.    History provided by:  Patient      Review of Systems   Constitutional:  Negative for fever.   Respiratory:  Negative for shortness of breath.    Gastrointestinal:  Positive for abdominal pain, constipation, nausea and vomiting. Negative for diarrhea.   All other systems reviewed and are negative.      Past Medical History:   Diagnosis Date    Constipation     Resolved    Generalized anxiety disorder     History of abnormal cervical Pap smear 07/2013    repeat normal per patient    History of colonoscopy 03/10/2014    normal except rectal irritation (bx neg)    History of dog bite 09/2014    left wrist    History of varicella     PCOS (polycystic ovarian syndrome)     Dx 2014.    PCOS (polycystic ovarian syndrome)     Vitamin D deficiency     Dx 4/17       Allergies   Allergen Reactions    Gildess 1.5-30 [Norethindrone-Eth Estradiol] Other (See Comments)     Excessive bleeding    Lexapro [Escitalopram Oxalate] Other (See Comments)     Body tingling and burning feeling  Vomiting excessive crying        Past Surgical History:   Procedure Laterality Date    NO PAST SURGERIES         Family History   Problem Relation Age of Onset    Hypothyroidism Sister         due to thyroidectomy    Thyroid cancer Sister     Diabetes Maternal Uncle     Diabetes Maternal Grandmother     Coronary artery disease Maternal Grandfather     Colon cancer Paternal Grandmother     Diabetes Paternal Grandfather        Social History     Socioeconomic  History    Marital status:     Number of children: 0   Tobacco Use    Smoking status: Never    Smokeless tobacco: Never   Vaping Use    Vaping Use: Never used   Substance and Sexual Activity    Alcohol use: Yes     Comment: 1 cocktail 3-4 times per week    Drug use: Not Currently     Comment: experimented with multiple drugs age [age]    Sexual activity: Yes     Partners: Female     Birth control/protection: None           Objective   Physical Exam  Vitals and nursing note reviewed.   Constitutional:       General: She is not in acute distress.     Appearance: Normal appearance. She is not ill-appearing.   HENT:      Head: Normocephalic and atraumatic.      Mouth/Throat:      Mouth: Mucous membranes are moist.   Eyes:      General: No scleral icterus.        Right eye: No discharge.         Left eye: No discharge.      Conjunctiva/sclera: Conjunctivae normal.   Cardiovascular:      Rate and Rhythm: Normal rate and regular rhythm.      Heart sounds: No murmur heard.  Pulmonary:      Effort: Pulmonary effort is normal. No respiratory distress.      Breath sounds: Normal breath sounds. No wheezing.   Abdominal:      General: Bowel sounds are decreased. There is no distension.      Palpations: Abdomen is soft.      Tenderness: There is abdominal tenderness (mild diffuse). There is no guarding or rebound.   Musculoskeletal:         General: No swelling. Normal range of motion.      Cervical back: Normal range of motion and neck supple.   Skin:     General: Skin is warm and dry.      Findings: No rash.   Neurological:      General: No focal deficit present.      Mental Status: She is alert and oriented to person, place, and time. Mental status is at baseline.   Psychiatric:         Mood and Affect: Mood normal.         Behavior: Behavior normal.         Thought Content: Thought content normal.         Procedures           ED Course    I reviewed ED visit from [facility] this morning and I reviewed her CT scan same date.   The CT shows a mild ileus/dilated small bowel loops without SBO.  Her ED note indicates benign eval and improved pain with Toradol/opiates wth surgical consult placed.  She was discharged with pain scripts.    Bentyl given here without relief.  Toradol ordered.    I discussed with Dr Encinas who will admit her for symptom mgmt and further eval.    Patient stable on serial rechecks.  Discussed exam findings, test results so far and concerns in detail at the bedside.  Discussed need for admission for further evaluation and treatment.  I discussed the patient's presentation, test results and need for admission with the hospitalist.                                         Medical Decision Making  Problems Addressed:  Acute post-operative pain: complicated acute illness or injury  Ileus: complicated acute illness or injury    Amount and/or Complexity of Data Reviewed  External Data Reviewed: radiology and notes.  Labs: ordered. Decision-making details documented in ED Course.  Discussion of management or test interpretation with external provider(s): General surgery    Risk  Prescription drug management.  Decision regarding hospitalization.        Final diagnoses:   Acute post-operative pain   Ileus       ED Disposition  ED Disposition       ED Disposition   Decision to Admit    Condition   --    Comment   Level of Care: Med/Surg [1]   Diagnosis: Postoperative abdominal pain [896388]   Admitting Physician: BLACK ENCINAS [[id number]]                 No follow-up provider specified.       Medication List      No changes were made to your prescriptions during this visit.            Archie Calderon MD  11/12/23 3433

## 2023-11-13 NOTE — PLAN OF CARE
Problem: Pain Acute  Goal: Acceptable Pain Control and Functional Ability  Outcome: Ongoing, Progressing   Goal Outcome Evaluation:

## 2023-11-14 ENCOUNTER — APPOINTMENT (OUTPATIENT)
Dept: GENERAL RADIOLOGY | Facility: HOSPITAL | Age: 33
End: 2023-11-14
Payer: MEDICAID

## 2023-11-14 ENCOUNTER — APPOINTMENT (OUTPATIENT)
Dept: CT IMAGING | Facility: HOSPITAL | Age: 33
End: 2023-11-14
Payer: MEDICAID

## 2023-11-14 LAB
ALBUMIN SERPL-MCNC: 4.1 G/DL (ref 3.5–5.2)
ALBUMIN/GLOB SERPL: 1.5 G/DL
ALP SERPL-CCNC: 115 U/L (ref 39–117)
ALT SERPL W P-5'-P-CCNC: 77 U/L (ref 1–33)
ANION GAP SERPL CALCULATED.3IONS-SCNC: 10 MMOL/L (ref 5–15)
AST SERPL-CCNC: 42 U/L (ref 1–32)
BILIRUB SERPL-MCNC: 1.3 MG/DL (ref 0–1.2)
BUN SERPL-MCNC: 5 MG/DL (ref 6–20)
BUN/CREAT SERPL: 7.4 (ref 7–25)
CALCIUM SPEC-SCNC: 9.2 MG/DL (ref 8.6–10.5)
CHLORIDE SERPL-SCNC: 102 MMOL/L (ref 98–107)
CO2 SERPL-SCNC: 26 MMOL/L (ref 22–29)
CREAT SERPL-MCNC: 0.68 MG/DL (ref 0.57–1)
DEPRECATED RDW RBC AUTO: 44.3 FL (ref 37–54)
EGFRCR SERPLBLD CKD-EPI 2021: 119.6 ML/MIN/1.73
ERYTHROCYTE [DISTWIDTH] IN BLOOD BY AUTOMATED COUNT: 13.1 % (ref 12.3–15.4)
GLOBULIN UR ELPH-MCNC: 2.8 GM/DL
GLUCOSE SERPL-MCNC: 124 MG/DL (ref 65–99)
HCT VFR BLD AUTO: 38 % (ref 34–46.6)
HGB BLD-MCNC: 12.8 G/DL (ref 12–15.9)
MCH RBC QN AUTO: 31.1 PG (ref 26.6–33)
MCHC RBC AUTO-ENTMCNC: 33.7 G/DL (ref 31.5–35.7)
MCV RBC AUTO: 92.2 FL (ref 79–97)
PLATELET # BLD AUTO: 318 10*3/MM3 (ref 140–450)
PMV BLD AUTO: 9.5 FL (ref 6–12)
POTASSIUM SERPL-SCNC: 4.1 MMOL/L (ref 3.5–5.2)
PROT SERPL-MCNC: 6.9 G/DL (ref 6–8.5)
RBC # BLD AUTO: 4.12 10*6/MM3 (ref 3.77–5.28)
SODIUM SERPL-SCNC: 138 MMOL/L (ref 136–145)
WBC NRBC COR # BLD: 10.57 10*3/MM3 (ref 3.4–10.8)

## 2023-11-14 PROCEDURE — 25010000002 HEPARIN (PORCINE) PER 1000 UNITS: Performed by: SURGERY

## 2023-11-14 PROCEDURE — C1769 GUIDE WIRE: HCPCS | Performed by: INTERNAL MEDICINE

## 2023-11-14 PROCEDURE — 25010000002 ESMOLOL 100 MG/10ML SOLUTION: Performed by: NURSE ANESTHETIST, CERTIFIED REGISTERED

## 2023-11-14 PROCEDURE — 25010000002 GLUCAGON (RDNA) PER 1 MG: Performed by: INTERNAL MEDICINE

## 2023-11-14 PROCEDURE — 25510000001 IOPAMIDOL 61 % SOLUTION: Performed by: INTERNAL MEDICINE

## 2023-11-14 PROCEDURE — 25510000001 IOPAMIDOL 61 % SOLUTION: Performed by: SURGERY

## 2023-11-14 PROCEDURE — 80053 COMPREHEN METABOLIC PANEL: CPT | Performed by: SURGERY

## 2023-11-14 PROCEDURE — 0F798DZ DILATION OF COMMON BILE DUCT WITH INTRALUMINAL DEVICE, VIA NATURAL OR ARTIFICIAL OPENING ENDOSCOPIC: ICD-10-PCS | Performed by: INTERNAL MEDICINE

## 2023-11-14 PROCEDURE — 25010000002 DEXAMETHASONE PER 1 MG: Performed by: NURSE ANESTHETIST, CERTIFIED REGISTERED

## 2023-11-14 PROCEDURE — 25010000002 PROPOFOL 10 MG/ML EMULSION: Performed by: NURSE ANESTHETIST, CERTIFIED REGISTERED

## 2023-11-14 PROCEDURE — 25010000002 PIPERACILLIN SOD-TAZOBACTAM PER 1 G: Performed by: SURGERY

## 2023-11-14 PROCEDURE — 25010000002 ONDANSETRON PER 1 MG: Performed by: NURSE ANESTHETIST, CERTIFIED REGISTERED

## 2023-11-14 PROCEDURE — 25810000003 LACTATED RINGERS PER 1000 ML: Performed by: ANESTHESIOLOGY

## 2023-11-14 PROCEDURE — 25010000002 MORPHINE PER 10 MG: Performed by: SURGERY

## 2023-11-14 PROCEDURE — 25010000002 KETOROLAC TROMETHAMINE PER 15 MG: Performed by: SURGERY

## 2023-11-14 PROCEDURE — G0378 HOSPITAL OBSERVATION PER HR: HCPCS

## 2023-11-14 PROCEDURE — C2617 STENT, NON-COR, TEM W/O DEL: HCPCS | Performed by: INTERNAL MEDICINE

## 2023-11-14 PROCEDURE — 74177 CT ABD & PELVIS W/CONTRAST: CPT

## 2023-11-14 PROCEDURE — 43274 ERCP DUCT STENT PLACEMENT: CPT | Performed by: INTERNAL MEDICINE

## 2023-11-14 PROCEDURE — 25010000002 SUGAMMADEX 200 MG/2ML SOLUTION: Performed by: NURSE ANESTHETIST, CERTIFIED REGISTERED

## 2023-11-14 PROCEDURE — 25010000002 ONDANSETRON PER 1 MG: Performed by: SURGERY

## 2023-11-14 PROCEDURE — 43273 ENDOSCOPIC PANCREATOSCOPY: CPT | Performed by: INTERNAL MEDICINE

## 2023-11-14 PROCEDURE — 74328 X-RAY BILE DUCT ENDOSCOPY: CPT | Performed by: INTERNAL MEDICINE

## 2023-11-14 PROCEDURE — 85027 COMPLETE CBC AUTOMATED: CPT | Performed by: SURGERY

## 2023-11-14 PROCEDURE — 74330 X-RAY BILE/PANC ENDOSCOPY: CPT

## 2023-11-14 DEVICE — BILIARY STENT
Type: IMPLANTABLE DEVICE | Site: BILE DUCT | Status: FUNCTIONAL
Brand: ADVANIX™ BILIARY

## 2023-11-14 RX ORDER — LIDOCAINE HYDROCHLORIDE 10 MG/ML
0.5 INJECTION, SOLUTION EPIDURAL; INFILTRATION; INTRACAUDAL; PERINEURAL ONCE AS NEEDED
Status: DISCONTINUED | OUTPATIENT
Start: 2023-11-14 | End: 2023-11-14 | Stop reason: HOSPADM

## 2023-11-14 RX ORDER — IBUPROFEN 600 MG/1
TABLET ORAL AS NEEDED
Status: DISCONTINUED | OUTPATIENT
Start: 2023-11-14 | End: 2023-11-14 | Stop reason: HOSPADM

## 2023-11-14 RX ORDER — SODIUM CHLORIDE, SODIUM LACTATE, POTASSIUM CHLORIDE, CALCIUM CHLORIDE 600; 310; 30; 20 MG/100ML; MG/100ML; MG/100ML; MG/100ML
9 INJECTION, SOLUTION INTRAVENOUS CONTINUOUS
Status: DISCONTINUED | OUTPATIENT
Start: 2023-11-14 | End: 2023-11-14

## 2023-11-14 RX ORDER — DEXAMETHASONE SODIUM PHOSPHATE 4 MG/ML
INJECTION, SOLUTION INTRA-ARTICULAR; INTRALESIONAL; INTRAMUSCULAR; INTRAVENOUS; SOFT TISSUE AS NEEDED
Status: DISCONTINUED | OUTPATIENT
Start: 2023-11-14 | End: 2023-11-14 | Stop reason: SURG

## 2023-11-14 RX ORDER — HYDROMORPHONE HYDROCHLORIDE 1 MG/ML
0.5 INJECTION, SOLUTION INTRAMUSCULAR; INTRAVENOUS; SUBCUTANEOUS
Status: DISCONTINUED | OUTPATIENT
Start: 2023-11-14 | End: 2023-11-14 | Stop reason: HOSPADM

## 2023-11-14 RX ORDER — ROCURONIUM BROMIDE 10 MG/ML
INJECTION, SOLUTION INTRAVENOUS AS NEEDED
Status: DISCONTINUED | OUTPATIENT
Start: 2023-11-14 | End: 2023-11-14 | Stop reason: SURG

## 2023-11-14 RX ORDER — KETOROLAC TROMETHAMINE 30 MG/ML
30 INJECTION, SOLUTION INTRAMUSCULAR; INTRAVENOUS EVERY 6 HOURS PRN
Status: DISCONTINUED | OUTPATIENT
Start: 2023-11-14 | End: 2023-11-14

## 2023-11-14 RX ORDER — SCOPOLAMINE 1 MG/3D
1 PATCH, EXTENDED RELEASE TRANSDERMAL ONCE
Status: DISCONTINUED | OUTPATIENT
Start: 2023-11-14 | End: 2023-11-16 | Stop reason: HOSPADM

## 2023-11-14 RX ORDER — PHENYLEPHRINE HCL IN 0.9% NACL 1 MG/10 ML
SYRINGE (ML) INTRAVENOUS AS NEEDED
Status: DISCONTINUED | OUTPATIENT
Start: 2023-11-14 | End: 2023-11-14 | Stop reason: SURG

## 2023-11-14 RX ORDER — SODIUM CHLORIDE 0.9 % (FLUSH) 0.9 %
10 SYRINGE (ML) INJECTION EVERY 12 HOURS SCHEDULED
Status: DISCONTINUED | OUTPATIENT
Start: 2023-11-14 | End: 2023-11-14 | Stop reason: HOSPADM

## 2023-11-14 RX ORDER — KETOROLAC TROMETHAMINE 30 MG/ML
30 INJECTION, SOLUTION INTRAMUSCULAR; INTRAVENOUS ONCE
Status: COMPLETED | OUTPATIENT
Start: 2023-11-14 | End: 2023-11-15

## 2023-11-14 RX ORDER — ESMOLOL HYDROCHLORIDE 10 MG/ML
INJECTION INTRAVENOUS AS NEEDED
Status: DISCONTINUED | OUTPATIENT
Start: 2023-11-14 | End: 2023-11-14 | Stop reason: SURG

## 2023-11-14 RX ORDER — INDOMETHACIN 50 MG/1
SUPPOSITORY RECTAL AS NEEDED
Status: DISCONTINUED | OUTPATIENT
Start: 2023-11-14 | End: 2023-11-14 | Stop reason: HOSPADM

## 2023-11-14 RX ORDER — IOPAMIDOL 612 MG/ML
INJECTION, SOLUTION INTRAVASCULAR AS NEEDED
Status: DISCONTINUED | OUTPATIENT
Start: 2023-11-14 | End: 2023-11-14 | Stop reason: HOSPADM

## 2023-11-14 RX ORDER — SODIUM CHLORIDE 9 MG/ML
40 INJECTION, SOLUTION INTRAVENOUS AS NEEDED
Status: DISCONTINUED | OUTPATIENT
Start: 2023-11-14 | End: 2023-11-14 | Stop reason: HOSPADM

## 2023-11-14 RX ORDER — FAMOTIDINE 10 MG/ML
20 INJECTION, SOLUTION INTRAVENOUS ONCE
Status: COMPLETED | OUTPATIENT
Start: 2023-11-14 | End: 2023-11-14

## 2023-11-14 RX ORDER — MIDAZOLAM HYDROCHLORIDE 1 MG/ML
1 INJECTION, SOLUTION INTRAMUSCULAR; INTRAVENOUS
Status: DISCONTINUED | OUTPATIENT
Start: 2023-11-14 | End: 2023-11-14 | Stop reason: HOSPADM

## 2023-11-14 RX ORDER — LIDOCAINE HYDROCHLORIDE 10 MG/ML
INJECTION, SOLUTION EPIDURAL; INFILTRATION; INTRACAUDAL; PERINEURAL AS NEEDED
Status: DISCONTINUED | OUTPATIENT
Start: 2023-11-14 | End: 2023-11-14 | Stop reason: SURG

## 2023-11-14 RX ORDER — IOPAMIDOL 612 MG/ML
100 INJECTION, SOLUTION INTRAVASCULAR
Status: COMPLETED | OUTPATIENT
Start: 2023-11-14 | End: 2023-11-14

## 2023-11-14 RX ORDER — FENTANYL CITRATE 50 UG/ML
50 INJECTION, SOLUTION INTRAMUSCULAR; INTRAVENOUS
Status: DISCONTINUED | OUTPATIENT
Start: 2023-11-14 | End: 2023-11-14 | Stop reason: HOSPADM

## 2023-11-14 RX ORDER — SODIUM CHLORIDE, SODIUM LACTATE, POTASSIUM CHLORIDE, CALCIUM CHLORIDE 600; 310; 30; 20 MG/100ML; MG/100ML; MG/100ML; MG/100ML
30 INJECTION, SOLUTION INTRAVENOUS CONTINUOUS PRN
Status: DISCONTINUED | OUTPATIENT
Start: 2023-11-14 | End: 2023-11-16 | Stop reason: HOSPADM

## 2023-11-14 RX ORDER — SODIUM CHLORIDE 0.9 % (FLUSH) 0.9 %
10 SYRINGE (ML) INJECTION AS NEEDED
Status: DISCONTINUED | OUTPATIENT
Start: 2023-11-14 | End: 2023-11-14 | Stop reason: HOSPADM

## 2023-11-14 RX ORDER — PROPOFOL 10 MG/ML
VIAL (ML) INTRAVENOUS AS NEEDED
Status: DISCONTINUED | OUTPATIENT
Start: 2023-11-14 | End: 2023-11-14 | Stop reason: SURG

## 2023-11-14 RX ORDER — ONDANSETRON 2 MG/ML
INJECTION INTRAMUSCULAR; INTRAVENOUS AS NEEDED
Status: DISCONTINUED | OUTPATIENT
Start: 2023-11-14 | End: 2023-11-14 | Stop reason: SURG

## 2023-11-14 RX ADMIN — DEXAMETHASONE SODIUM PHOSPHATE 4 MG: 4 INJECTION, SOLUTION INTRAMUSCULAR; INTRAVENOUS at 09:26

## 2023-11-14 RX ADMIN — MORPHINE SULFATE 2 MG: 2 INJECTION, SOLUTION INTRAMUSCULAR; INTRAVENOUS at 17:16

## 2023-11-14 RX ADMIN — HEPARIN SODIUM 5000 UNITS: 5000 INJECTION INTRAVENOUS; SUBCUTANEOUS at 13:13

## 2023-11-14 RX ADMIN — MORPHINE SULFATE 2 MG: 2 INJECTION, SOLUTION INTRAMUSCULAR; INTRAVENOUS at 08:17

## 2023-11-14 RX ADMIN — LIDOCAINE HYDROCHLORIDE 100 MG: 10 INJECTION, SOLUTION EPIDURAL; INFILTRATION; INTRACAUDAL; PERINEURAL at 09:21

## 2023-11-14 RX ADMIN — SODIUM CHLORIDE, POTASSIUM CHLORIDE, SODIUM LACTATE AND CALCIUM CHLORIDE: 600; 310; 30; 20 INJECTION, SOLUTION INTRAVENOUS at 10:02

## 2023-11-14 RX ADMIN — KETOROLAC TROMETHAMINE 30 MG: 30 INJECTION, SOLUTION INTRAMUSCULAR; INTRAVENOUS at 02:14

## 2023-11-14 RX ADMIN — MORPHINE SULFATE 2 MG: 2 INJECTION, SOLUTION INTRAMUSCULAR; INTRAVENOUS at 05:34

## 2023-11-14 RX ADMIN — ONDANSETRON 4 MG: 2 INJECTION INTRAMUSCULAR; INTRAVENOUS at 09:26

## 2023-11-14 RX ADMIN — PIPERACILLIN SODIUM AND TAZOBACTAM SODIUM 3.38 G: 3; .375 INJECTION, SOLUTION INTRAVENOUS at 13:13

## 2023-11-14 RX ADMIN — SODIUM CHLORIDE, POTASSIUM CHLORIDE, SODIUM LACTATE AND CALCIUM CHLORIDE: 600; 310; 30; 20 INJECTION, SOLUTION INTRAVENOUS at 09:18

## 2023-11-14 RX ADMIN — Medication 100 MCG: at 09:35

## 2023-11-14 RX ADMIN — FAMOTIDINE 20 MG: 10 INJECTION INTRAVENOUS at 08:14

## 2023-11-14 RX ADMIN — Medication 200 MCG: at 09:44

## 2023-11-14 RX ADMIN — SCOPOLAMINE 1 PATCH: 1.5 PATCH, EXTENDED RELEASE TRANSDERMAL at 08:32

## 2023-11-14 RX ADMIN — HEPARIN SODIUM 5000 UNITS: 5000 INJECTION INTRAVENOUS; SUBCUTANEOUS at 22:21

## 2023-11-14 RX ADMIN — SUGAMMADEX 150 MG: 100 INJECTION, SOLUTION INTRAVENOUS at 10:02

## 2023-11-14 RX ADMIN — ESMOLOL HYDROCHLORIDE 50 MG: 10 INJECTION, SOLUTION INTRAVENOUS at 09:21

## 2023-11-14 RX ADMIN — MORPHINE SULFATE 2 MG: 2 INJECTION, SOLUTION INTRAMUSCULAR; INTRAVENOUS at 11:39

## 2023-11-14 RX ADMIN — BISACODYL 10 MG: 10 SUPPOSITORY RECTAL at 11:39

## 2023-11-14 RX ADMIN — DOCUSATE SODIUM 100 MG: 100 CAPSULE, LIQUID FILLED ORAL at 22:21

## 2023-11-14 RX ADMIN — HEPARIN SODIUM 5000 UNITS: 5000 INJECTION INTRAVENOUS; SUBCUTANEOUS at 05:33

## 2023-11-14 RX ADMIN — IOPAMIDOL 85 ML: 612 INJECTION, SOLUTION INTRAVENOUS at 17:51

## 2023-11-14 RX ADMIN — PIPERACILLIN SODIUM AND TAZOBACTAM SODIUM 3.38 G: 3; .375 INJECTION, SOLUTION INTRAVENOUS at 05:34

## 2023-11-14 RX ADMIN — PROPOFOL 250 MG: 10 INJECTION, EMULSION INTRAVENOUS at 09:21

## 2023-11-14 RX ADMIN — OXYCODONE HYDROCHLORIDE AND ACETAMINOPHEN 2 TABLET: 5; 325 TABLET ORAL at 19:01

## 2023-11-14 RX ADMIN — ONDANSETRON 4 MG: 2 INJECTION INTRAMUSCULAR; INTRAVENOUS at 05:33

## 2023-11-14 RX ADMIN — OXYCODONE HYDROCHLORIDE AND ACETAMINOPHEN 2 TABLET: 5; 325 TABLET ORAL at 13:21

## 2023-11-14 RX ADMIN — ROCURONIUM BROMIDE 50 MG: 10 SOLUTION INTRAVENOUS at 09:21

## 2023-11-14 RX ADMIN — DOCUSATE SODIUM 100 MG: 100 CAPSULE, LIQUID FILLED ORAL at 11:39

## 2023-11-14 RX ADMIN — POLYETHYLENE GLYCOL 3350 17 G: 17 POWDER, FOR SOLUTION ORAL at 11:39

## 2023-11-14 RX ADMIN — PIPERACILLIN SODIUM AND TAZOBACTAM SODIUM 3.38 G: 3; .375 INJECTION, SOLUTION INTRAVENOUS at 22:21

## 2023-11-14 NOTE — PROGRESS NOTES
"Patient Name:  Martina Hopkins  YOB: 1990  [id number]    Surgery Progress Note    Date of visit: 11/14/2023      Subjective: No acute events overnight.  Still has epigastric and right upper quadrant pain.  Not significantly improved.  No nausea or vomiting.  No bowel movements overnight.          Objective:     /57 (BP Location: Left arm, Patient Position: Sitting)   Pulse 102   Temp 97.2 °F (36.2 °C) (Tympanic)   Resp 16   Ht 157.5 cm (62\")   Wt 82.1 kg (181 lb)   LMP 10/11/2023   SpO2 98%   BMI 33.11 kg/m²     Intake/Output Summary (Last 24 hours) at 11/14/2023 0822  Last data filed at 11/13/2023 2147  Gross per 24 hour   Intake 50 ml   Output --   Net 50 ml       GEN:   Awake, alert, in no acute distress, resting comfortably in wheelchair getting ready to go to Endo  CV:   Regular rate and rhythm  L:  Symmetric expansion, not labored on room air  Abd:  Soft, tender to palpation throughout the epigastrium and right upper quadrant, incisions are clean dry intact  Ext:  No cyanosis, clubbing, or edema    Recent labs that are back at this time have been reviewed.           Assessment/ Plan:    Mrs. Hopkins is a 33-year-old lady with history of PCOS who is postoperative day 3 following laparoscopic cholecystectomy who presents to the emergency department with recurrent abdominal pain.        #Bile leak status postcholecystectomy  -Postoperative day 5 following lap jeromy  -HIDA scan yesterday was concerning for bile leak  -LFTs are up slightly today congruent with bile leak.  White blood cell count is down slightly  -GI has been consulted and is planning for ERCP today  -Continue Zosyn  -Continue bowel regimen and pain control  -We will follow up results of ERCP.  May require laparoscopic washout         Malick Hackett MD  11/14/2023  08:22 EST      "

## 2023-11-14 NOTE — PLAN OF CARE
Problem: Adult Inpatient Plan of Care  Goal: Plan of Care Review  Outcome: Ongoing, Progressing  Goal: Patient-Specific Goal (Individualized)  Outcome: Ongoing, Progressing  Goal: Absence of Hospital-Acquired Illness or Injury  Outcome: Ongoing, Progressing  Intervention: Identify and Manage Fall Risk  Recent Flowsheet Documentation  Taken 11/14/2023 1600 by Galina Dawson RN  Safety Promotion/Fall Prevention:   activity supervised   assistive device/personal items within reach   clutter free environment maintained   room organization consistent   safety round/check completed  Taken 11/14/2023 1130 by Galina Dawson RN  Safety Promotion/Fall Prevention:   assistive device/personal items within reach   clutter free environment maintained   room organization consistent   safety round/check completed  Taken 11/14/2023 0731 by Galina Dawson RN  Safety Promotion/Fall Prevention:   activity supervised   assistive device/personal items within reach   clutter free environment maintained   room organization consistent   safety round/check completed  Intervention: Prevent Skin Injury  Recent Flowsheet Documentation  Taken 11/14/2023 1600 by Galina Dawson RN  Body Position: position changed independently  Skin Protection:   adhesive use limited   incontinence pads utilized   transparent dressing maintained   tubing/devices free from skin contact  Taken 11/14/2023 1130 by Galina Dawson RN  Body Position: position changed independently  Skin Protection:   adhesive use limited   incontinence pads utilized   tubing/devices free from skin contact   transparent dressing maintained  Taken 11/14/2023 0731 by Galina Dawson RN  Body Position: position changed independently  Skin Protection:   adhesive use limited   tubing/devices free from skin contact  Intervention: Prevent and Manage VTE (Venous Thromboembolism) Risk  Recent Flowsheet Documentation  Taken 11/14/2023 1600 by Galina Dawson RN  Activity Management:  up ad dayday  Taken 11/14/2023 1130 by Galina Dawson RN  Activity Management: up ad dayday  Taken 11/14/2023 0731 by Galina Dawson RN  Activity Management: activity encouraged  Intervention: Prevent Infection  Recent Flowsheet Documentation  Taken 11/14/2023 1600 by Galina Dawson RN  Infection Prevention: environmental surveillance performed  Taken 11/14/2023 1130 by Galina Dawson RN  Infection Prevention: environmental surveillance performed  Taken 11/14/2023 0731 by Galina Dawson RN  Infection Prevention: environmental surveillance performed  Goal: Optimal Comfort and Wellbeing  Outcome: Ongoing, Progressing  Intervention: Provide Person-Centered Care  Recent Flowsheet Documentation  Taken 11/14/2023 0731 by Galina Dawson RN  Trust Relationship/Rapport:   choices provided   care explained  Goal: Readiness for Transition of Care  Outcome: Ongoing, Progressing   Goal Outcome Evaluation:

## 2023-11-14 NOTE — ANESTHESIA POSTPROCEDURE EVALUATION
Patient: Martina Hopkins    Procedure Summary       Date: 11/14/23 Room / Location: [facility] ENDOSCOPY 3 /  [facility]    Anesthesia Start: 0917 Anesthesia Stop: 1012    Procedure: ENDOSCOPIC RETROGRADE CHOLANGIOPANCREATOGRAPHY Diagnosis:       Bile leak      (Bile leak [K83.9])    Surgeons: Jose Luis Woods MD Provider: Kushal Churchill MD    Anesthesia Type: general ASA Status: 2            Anesthesia Type: general    Vitals  No vitals data found for the desired time range.          Post Anesthesia Care and Evaluation    Patient location during evaluation: PACU  Patient participation: complete - patient participated  Level of consciousness: responsive to verbal stimuli  Pain score: 0  Pain management: adequate    Airway patency: patent  Anesthetic complications: No anesthetic complications  PONV Status: none  Cardiovascular status: hemodynamically stable and acceptable  Respiratory status: nonlabored ventilation, acceptable, nasal cannula and spontaneous ventilation  Hydration status: acceptable    Comments: VSS  No anesthesia care post op

## 2023-11-14 NOTE — ANESTHESIA PROCEDURE NOTES
Airway  Urgency: elective    Date/Time: 11/14/2023 9:22 AM  Airway not difficult    General Information and Staff    Patient location during procedure: OR  SRNA: Rhonda Clayton, MARTA  Indications and Patient Condition  Indications for airway management: airway protection    Preoxygenated: yes  MILS not maintained throughout  Mask difficulty assessment: 1 - vent by mask    Final Airway Details  Final airway type: endotracheal airway      Successful airway: ETT  Cuffed: yes   Successful intubation technique: direct laryngoscopy  Endotracheal tube insertion site: oral  Blade: Constantino  Blade size: 2  ETT size (mm): 7.5  Cormack-Lehane Classification: grade I - full view of glottis  Placement verified by: chest auscultation and capnometry   Measured from: lips  ETT/EBT  to lips (cm): 21  Number of attempts at approach: 1  Assessment: lips, teeth, and gum same as pre-op and atraumatic intubation    Additional Comments  Negative epigastric sounds, Breath sound equal bilaterally with symmetric chest rise and fall

## 2023-11-14 NOTE — ANESTHESIA PREPROCEDURE EVALUATION
Anesthesia Evaluation     Patient summary reviewed and Nursing notes reviewed                Airway   Mallampati: II  TM distance: >3 FB  Neck ROM: full  No difficulty expected  Dental - normal exam     Pulmonary - negative pulmonary ROS and normal exam   Cardiovascular - negative cardio ROS and normal exam        Neuro/Psych  (+) psychiatric history Anxiety  GI/Hepatic/Renal/Endo - negative ROS     Musculoskeletal (-) negative ROS    Abdominal  - normal exam    Bowel sounds: normal.   Substance History - negative use     OB/GYN negative ob/gyn ROS     Comment: PCOS      Other                    Anesthesia Plan    ASA 2     general     intravenous induction     Anesthetic plan, risks, benefits, and alternatives have been provided, discussed and informed consent has been obtained with: patient.    Plan discussed with CRNA.    CODE STATUS:    Level Of Support Discussed With: Patient  Code Status (Patient has no pulse and is not breathing): CPR (Attempt to Resuscitate)  Medical Interventions (Patient has pulse or is breathing): Full Support

## 2023-11-15 ENCOUNTER — ANESTHESIA EVENT (OUTPATIENT)
Dept: TELEMETRY | Facility: HOSPITAL | Age: 33
End: 2023-11-15

## 2023-11-15 ENCOUNTER — ANESTHESIA (OUTPATIENT)
Dept: TELEMETRY | Facility: HOSPITAL | Age: 33
End: 2023-11-15

## 2023-11-15 LAB
ALBUMIN SERPL-MCNC: 3.5 G/DL (ref 3.5–5.2)
ALBUMIN/GLOB SERPL: 1.4 G/DL
ALP SERPL-CCNC: 107 U/L (ref 39–117)
ALT SERPL W P-5'-P-CCNC: 59 U/L (ref 1–33)
ANION GAP SERPL CALCULATED.3IONS-SCNC: 9 MMOL/L (ref 5–15)
AST SERPL-CCNC: 26 U/L (ref 1–32)
BILIRUB SERPL-MCNC: 0.5 MG/DL (ref 0–1.2)
BUN SERPL-MCNC: 7 MG/DL (ref 6–20)
BUN/CREAT SERPL: 13.2 (ref 7–25)
CALCIUM SPEC-SCNC: 9 MG/DL (ref 8.6–10.5)
CHLORIDE SERPL-SCNC: 104 MMOL/L (ref 98–107)
CO2 SERPL-SCNC: 24 MMOL/L (ref 22–29)
CREAT SERPL-MCNC: 0.53 MG/DL (ref 0.57–1)
DEPRECATED RDW RBC AUTO: 44.6 FL (ref 37–54)
EGFRCR SERPLBLD CKD-EPI 2021: 127 ML/MIN/1.73
ERYTHROCYTE [DISTWIDTH] IN BLOOD BY AUTOMATED COUNT: 12.9 % (ref 12.3–15.4)
GLOBULIN UR ELPH-MCNC: 2.5 GM/DL
GLUCOSE SERPL-MCNC: 114 MG/DL (ref 65–99)
HCT VFR BLD AUTO: 33.7 % (ref 34–46.6)
HGB BLD-MCNC: 11 G/DL (ref 12–15.9)
MCH RBC QN AUTO: 30.5 PG (ref 26.6–33)
MCHC RBC AUTO-ENTMCNC: 32.6 G/DL (ref 31.5–35.7)
MCV RBC AUTO: 93.4 FL (ref 79–97)
PLATELET # BLD AUTO: 296 10*3/MM3 (ref 140–450)
PMV BLD AUTO: 9.6 FL (ref 6–12)
POTASSIUM SERPL-SCNC: 4.3 MMOL/L (ref 3.5–5.2)
PROT SERPL-MCNC: 6 G/DL (ref 6–8.5)
RBC # BLD AUTO: 3.61 10*6/MM3 (ref 3.77–5.28)
SODIUM SERPL-SCNC: 137 MMOL/L (ref 136–145)
WBC NRBC COR # BLD: 7.54 10*3/MM3 (ref 3.4–10.8)

## 2023-11-15 PROCEDURE — 85027 COMPLETE CBC AUTOMATED: CPT | Performed by: INTERNAL MEDICINE

## 2023-11-15 PROCEDURE — 25010000002 METHYLNALTREXONE 12 MG/0.6ML SOLUTION: Performed by: NURSE PRACTITIONER

## 2023-11-15 PROCEDURE — 25010000002 KETOROLAC TROMETHAMINE PER 15 MG: Performed by: SURGERY

## 2023-11-15 PROCEDURE — 25010000002 MORPHINE PER 10 MG: Performed by: SURGERY

## 2023-11-15 PROCEDURE — 25010000002 PIPERACILLIN SOD-TAZOBACTAM PER 1 G: Performed by: SURGERY

## 2023-11-15 PROCEDURE — 99232 SBSQ HOSP IP/OBS MODERATE 35: CPT | Performed by: INTERNAL MEDICINE

## 2023-11-15 PROCEDURE — 80053 COMPREHEN METABOLIC PANEL: CPT | Performed by: INTERNAL MEDICINE

## 2023-11-15 PROCEDURE — 25010000002 HEPARIN (PORCINE) PER 1000 UNITS: Performed by: SURGERY

## 2023-11-15 RX ORDER — ACETAMINOPHEN 325 MG/1
650 TABLET ORAL EVERY 6 HOURS PRN
Status: DISCONTINUED | OUTPATIENT
Start: 2023-11-15 | End: 2023-11-16 | Stop reason: HOSPADM

## 2023-11-15 RX ORDER — KETOROLAC TROMETHAMINE 30 MG/ML
15 INJECTION, SOLUTION INTRAMUSCULAR; INTRAVENOUS EVERY 6 HOURS PRN
Status: DISCONTINUED | OUTPATIENT
Start: 2023-11-15 | End: 2023-11-16 | Stop reason: HOSPADM

## 2023-11-15 RX ORDER — KETOROLAC TROMETHAMINE 30 MG/ML
30 INJECTION, SOLUTION INTRAMUSCULAR; INTRAVENOUS EVERY 6 HOURS PRN
Status: DISCONTINUED | OUTPATIENT
Start: 2023-11-15 | End: 2023-11-15

## 2023-11-15 RX ORDER — IBUPROFEN 600 MG/1
600 TABLET, FILM COATED ORAL EVERY 4 HOURS PRN
Status: DISCONTINUED | OUTPATIENT
Start: 2023-11-15 | End: 2023-11-16 | Stop reason: HOSPADM

## 2023-11-15 RX ORDER — CALCIUM CARBONATE 500 MG/1
2 TABLET, CHEWABLE ORAL 3 TIMES DAILY PRN
Status: DISCONTINUED | OUTPATIENT
Start: 2023-11-15 | End: 2023-11-16 | Stop reason: HOSPADM

## 2023-11-15 RX ADMIN — DOCUSATE SODIUM 100 MG: 100 CAPSULE, LIQUID FILLED ORAL at 21:52

## 2023-11-15 RX ADMIN — DOCUSATE SODIUM 100 MG: 100 CAPSULE, LIQUID FILLED ORAL at 09:09

## 2023-11-15 RX ADMIN — PIPERACILLIN SODIUM AND TAZOBACTAM SODIUM 3.38 G: 3; .375 INJECTION, SOLUTION INTRAVENOUS at 06:05

## 2023-11-15 RX ADMIN — HEPARIN SODIUM 5000 UNITS: 5000 INJECTION INTRAVENOUS; SUBCUTANEOUS at 21:51

## 2023-11-15 RX ADMIN — PIPERACILLIN SODIUM AND TAZOBACTAM SODIUM 3.38 G: 3; .375 INJECTION, SOLUTION INTRAVENOUS at 14:26

## 2023-11-15 RX ADMIN — IBUPROFEN 600 MG: 600 TABLET, FILM COATED ORAL at 11:26

## 2023-11-15 RX ADMIN — OXYCODONE HYDROCHLORIDE AND ACETAMINOPHEN 2 TABLET: 5; 325 TABLET ORAL at 22:22

## 2023-11-15 RX ADMIN — MORPHINE SULFATE 2 MG: 2 INJECTION, SOLUTION INTRAMUSCULAR; INTRAVENOUS at 19:51

## 2023-11-15 RX ADMIN — METHYLNALTREXONE BROMIDE 8 MG: 12 INJECTION, SOLUTION SUBCUTANEOUS at 09:09

## 2023-11-15 RX ADMIN — HEPARIN SODIUM 5000 UNITS: 5000 INJECTION INTRAVENOUS; SUBCUTANEOUS at 14:26

## 2023-11-15 RX ADMIN — KETOROLAC TROMETHAMINE 15 MG: 30 INJECTION, SOLUTION INTRAMUSCULAR; INTRAVENOUS at 17:49

## 2023-11-15 RX ADMIN — POLYETHYLENE GLYCOL 3350 17 G: 17 POWDER, FOR SOLUTION ORAL at 09:09

## 2023-11-15 RX ADMIN — KETOROLAC TROMETHAMINE 30 MG: 30 INJECTION, SOLUTION INTRAMUSCULAR; INTRAVENOUS at 01:07

## 2023-11-15 RX ADMIN — PIPERACILLIN SODIUM AND TAZOBACTAM SODIUM 3.38 G: 3; .375 INJECTION, SOLUTION INTRAVENOUS at 21:51

## 2023-11-15 RX ADMIN — CALCIUM CARBONATE (ANTACID) CHEW TAB 500 MG 2 TABLET: 500 CHEW TAB at 11:26

## 2023-11-15 NOTE — CASE MANAGEMENT/SOCIAL WORK
Continued Stay Note  [facility]     Patient Name: Martina Hopkins  MRN: [id number]  Today's Date: 11/15/2023    Admit Date: 11/12/2023    Plan: home   Discharge Plan       Row Name 11/15/23 0821       Plan    Plan home    Patient/Family in Agreement with Plan yes    Plan Comments CM spoke with this patient at the bedside. Surgery stating that she can be discharged after increased bowel function, probably in the next day or two. Her plan remains home with family to transport. CM to follow.    Final Discharge Disposition Code 01 - home or self-care                   Discharge Codes    No documentation.                 Expected Discharge Date and Time       Expected Discharge Date Expected Discharge Time    Nov 17, 2023               Lina Otto RN

## 2023-11-15 NOTE — PAYOR COMM NOTE
"Martina Quintero (33 y.o. Female)     [id number]     Lara Dahl, JUANA  Utilization Review  [phone]  [phone]        OBS to INPT      Inpatient Admission  Once     Completed     Level of Care: Telemetry  Diagnosis: Postoperative abdominal pain [753542]  Admitting Physician: BLACK HACKETT [[id number]]  Attending Physician: BLACK HACKETT [[id number]]  Certification: I Certify That Inpatient Hospital Services Are Medically Necessary For Greater Than 2 Midnights    11/15/23 1138             Date of Birth   1990    Social Security Number       Address   [address] [place] [address]    Home Phone   [phone]    [facility]   [id number]       Orthodoxy   None    Marital Status                               Admission Date   11/12/23    Admission Type   Emergency    Admitting Provider   Black Hackett MD    Attending Provider   Black Hackett MD    Department, Room/Bed   [address], S574/1       Discharge Date       Discharge Disposition       Discharge Destination                                 Attending Provider: Black Hackett MD    Allergies: Gildess 1.5-30 [Norethindrone-eth Estradiol], Lexapro [Escitalopram Oxalate]    Isolation: None   Infection: None   Code Status: CPR    Ht: 157.5 cm (62\")   Wt: 82.1 kg (181 lb)    Admission Cmt: None   Principal Problem: Postoperative abdominal pain [R10.9,G89.18]                   Active Insurance as of 11/12/2023       Primary Coverage       Payor Plan Insurance Group Employer/Plan Group    AN[place] MEDICAID [employer] MEDICAID [id number]       Payor Plan Address Payor Plan Phone Number Payor Plan Fax Number Effective Dates    [address] [address] [phone]  1/1/2017 - None Entered    [facility] [id number]         Subscriber Name Subscriber Birth Date Member ID       MARTINA QUINTERO 1990 [id number]                     Emergency Contacts        (Rel.) Home Phone Work Phone Mobile Phone "    Caleb Hopkins (Mother) [phone] -- --    GHASSAN CORBETT (Spouse) [phone] -- --                 History & Physical        Malick Hackett MD at 11/12/23 1818          General Surgery History and Physical Note    Patient Name:  Martina Hopkins  YOB: 1990  [id number]    Date of Service: 11/12/2023       Patient Care Team:  Taylor Ventura MD as PCP - General  Malick Hackett MD as Surgeon (General Surgery)      General Surgery History and Physical    Date: 11/12/23    Chief Complaint -abdominal pain status postcholecystectomy    Subjective     Patient is a 33 y.o. female who presents with abdominal pain status postcholecystectomy on November 9.  She is a 33-year-old lady with history of PCOS who is well-known to me after undergoing outpatient elective laparoscopic cholecystectomy 3 days ago on November 9.  She reports that she initially recovered as expected following surgery with abdominal soreness Thursday afternoon and Friday evening.  On postoperative day 2 she began to have some increased abdominal pain.  She describes this as pain mostly in the right upper quadrant but seems to radiate and move throughout her abdomen.  This is crampy.  The pain worsened throughout the day and led her to present to the [facility] emergency department last night.  There she underwent a CT scan as well as labs, these demonstrated only a small amount of fluid postsurgical within the cholecystectomy bed and she was discharged.  She reports that she initially felt better after ER discharge but her symptoms returned today and she had significant abdominal pain.  She describes pain in the right upper quadrant that radiates to the epigastrium and left upper quadrant.  The pain is crampy.  She had 1 episode of emesis this morning and has had some mild nausea.  She has passed flatus only once or twice since surgery and has not had a bowel movement.  She denies fevers or chills.  This is not similar to  the pain that she was having before surgery.    Problems Addressed this Visit    None  Visit Diagnoses       Acute post-operative pain    -  Primary    Ileus              Diagnoses         Codes Comments    Acute post-operative pain    -  Primary ICD-10-CM: G89.18  ICD-9-CM: 338.18     Ileus     ICD-10-CM: K56.7  ICD-9-CM: 560.1             PMHx:  Past Medical History:   Diagnosis Date    Constipation     Resolved    Generalized anxiety disorder     History of abnormal cervical Pap smear 07/2013    repeat normal per patient    History of colonoscopy 03/10/2014    normal except rectal irritation (bx neg)    History of dog bite 09/2014    left wrist    History of varicella     PCOS (polycystic ovarian syndrome)     Dx 2014.    PCOS (polycystic ovarian syndrome)     Vitamin D deficiency     Dx 4/17       Past Surgical History:  Past Surgical History:    NO PAST SURGERIES       Allergies:  Allergies   Allergen Reactions    Gildess 1.5-30 [Norethindrone-Eth Estradiol] Other (See Comments)     Excessive bleeding    Lexapro [Escitalopram Oxalate] Other (See Comments)     Body tingling and burning feeling  Vomiting excessive crying        Medications:  Current Facility-Administered Medications on File Prior to Encounter   Medication Dose Route Frequency Provider Last Rate Last Admin    [COMPLETED] iopamidol (ISOVUE-300) 61 % injection 100 mL  100 mL Intravenous Once in imaging Peter Burciaga MD   100 mL at 11/12/23 0252    [COMPLETED] ketorolac (TORADOL) injection 30 mg  30 mg Intravenous Once Peter Burciaga MD   30 mg at 11/12/23 0415    [COMPLETED] morphine injection 4 mg  4 mg Intravenous Once Peter Burciaga MD   4 mg at 11/12/23 0227    [COMPLETED] ondansetron (ZOFRAN) injection 4 mg  4 mg Intravenous Once Peter Burciaga MD   4 mg at 11/12/23 0227    [COMPLETED] sodium chloride 0.9 % bolus 1,000 mL  1,000 mL Intravenous Once Peter Burciaga MD   Stopped at 11/12/23 0258     [DISCONTINUED] sodium chloride 0.9 % flush 10 mL  10 mL Intravenous PRN Peter Burciaga MD         Current Outpatient Medications on File Prior to Encounter   Medication Sig Dispense Refill    benzonatate (Tessalon Perles) 100 MG capsule Take 1 capsule by mouth 3 (Three) Times a Day As Needed for Cough. 30 capsule 0    docusate sodium (Colace) 100 MG capsule Take 1 capsule by mouth 2 (Two) Times a Day. 30 capsule 0    famotidine (Pepcid) 20 MG tablet Take 1 tablet by mouth 2 (Two) Times a Day. 60 tablet 0    fluticasone (FLONASE) 50 MCG/ACT nasal spray 2 sprays into the nostril(s) as directed by provider Daily for 7 days. 9.9 mL 0    ketorolac (TORADOL) 10 MG tablet Take 1 tablet by mouth Every 6 (Six) Hours As Needed for Moderate Pain. 20 tablet 0    meclizine (ANTIVERT) 25 MG tablet Take 1 tablet by mouth 3 (Three) Times a Day As Needed for Dizziness. 21 tablet 0    methylPREDNISolone (MEDROL) 4 MG dose pack Take as directed on package instructions. 21 tablet 0    ondansetron (ZOFRAN) 4 MG tablet Take 1 tablet by mouth Every 6 (Six) Hours As Needed. 12 tablet 0    oxyCODONE-acetaminophen (Percocet) 5-325 MG per tablet Take 1 tablet by mouth Every 4 (Four) to 6 (Six) Hours As Needed for Severe Pain. 12 tablet 0    promethazine-dextromethorphan (PROMETHAZINE-DM) 6.25-15 MG/5ML syrup Take 5 mL by mouth 4 (Four) Times a Day As Needed for Cough. 60 mL 0    [DISCONTINUED] oxyCODONE-acetaminophen (Percocet) 5-325 MG per tablet Take 1 tablet by mouth Every 4 (Four) to 6 (Six) Hours As Needed for pain. 12 tablet 0         Current Facility-Administered Medications:     bisacodyl (DULCOLAX) EC tablet 10 mg, 10 mg, Oral, Daily, Malick Hackett MD    docusate sodium (COLACE) capsule 100 mg, 100 mg, Oral, BID, Malick Hackett MD    magnesium hydroxide (MILK OF MAGNESIA) suspension 10 mL, 10 mL, Oral, BID, Malick Hackett MD    Sodium Chloride (PF) 0.9 % 10 mL, 10 mL, Intravenous, PRN, Archie Calderon,  MD    Current Outpatient Medications:     benzonatate (Tessalon Perles) 100 MG capsule, Take 1 capsule by mouth 3 (Three) Times a Day As Needed for Cough., Disp: 30 capsule, Rfl: 0    docusate sodium (Colace) 100 MG capsule, Take 1 capsule by mouth 2 (Two) Times a Day., Disp: 30 capsule, Rfl: 0    famotidine (Pepcid) 20 MG tablet, Take 1 tablet by mouth 2 (Two) Times a Day., Disp: 60 tablet, Rfl: 0    fluticasone (FLONASE) 50 MCG/ACT nasal spray, 2 sprays into the nostril(s) as directed by provider Daily for 7 days., Disp: 9.9 mL, Rfl: 0    ketorolac (TORADOL) 10 MG tablet, Take 1 tablet by mouth Every 6 (Six) Hours As Needed for Moderate Pain., Disp: 20 tablet, Rfl: 0    meclizine (ANTIVERT) 25 MG tablet, Take 1 tablet by mouth 3 (Three) Times a Day As Needed for Dizziness., Disp: 21 tablet, Rfl: 0    methylPREDNISolone (MEDROL) 4 MG dose pack, Take as directed on package instructions., Disp: 21 tablet, Rfl: 0    ondansetron (ZOFRAN) 4 MG tablet, Take 1 tablet by mouth Every 6 (Six) Hours As Needed., Disp: 12 tablet, Rfl: 0    oxyCODONE-acetaminophen (Percocet) 5-325 MG per tablet, Take 1 tablet by mouth Every 4 (Four) to 6 (Six) Hours As Needed for Severe Pain., Disp: 12 tablet, Rfl: 0    promethazine-dextromethorphan (PROMETHAZINE-DM) 6.25-15 MG/5ML syrup, Take 5 mL by mouth 4 (Four) Times a Day As Needed for Cough., Disp: 60 mL, Rfl: 0      Family History:  Family History   Problem Relation Age of Onset    Hypothyroidism Sister         due to thyroidectomy    Thyroid cancer Sister     Diabetes Maternal Uncle     Diabetes Maternal Grandmother     Coronary artery disease Maternal Grandfather     Colon cancer Paternal Grandmother     Diabetes Paternal Grandfather        Social History: Pt lives in [facility].    Tobacco use: Denies     EtOH use : Occasional   Illicit drug use: Denies       Review of Systems:   Constitutional: No fevers, chills or malaise   Eyes: Denies visual changes    Cardiovascular:  "Denies chest pain, palpitations   Pulmonary: Denies cough or shortness of breath   Abdominal/ GI: See HPI    Genitourinary: Denies dysuria or hematuria   Musculoskeletal: Denies any but chronic joint aches, pains or deformities   Psychiatric: No recent mood changes   Neurologic: No paresthesias or loss of function    /81 (BP Location: Left arm, Patient Position: Sitting)   Pulse 63   Temp 97.9 °F (36.6 °C) (Oral)   Resp 14   Ht 157.5 cm (62\")   Wt 82.1 kg (181 lb)   LMP 10/11/2023   SpO2 99%   BMI 33.11 kg/m²   Body mass index is 33.11 kg/m².    Gen: Awake, alert, sitting up in bed, no obvious distress but appears generally uncomfortable  Head: Normocephalic, atraumatic.   Eyes: Pupils equal, round, react to light and accommodation.   Mouth: Oral mucosa without lesions,   Neck: No masses, lymphadenopathy or carotid bruits bilaterally   CV: Rhythm and rate regular, no murmurs, rubs or gallops  Lungs: Clear to auscultation bilaterally, not labored on room air   Abdomen: Soft, not obviously distended, incisions are clean dry and intact with only a small amount of ecchymosis surrounding incision sites, there is generalized tenderness to palpation throughout the upper abdomen that is somewhat worse in the right upper quadrant but there is no rebound tenderness or guarding  Groin : No obvious hernias bilaterally   Extremities:  No cyanosis, clubbing or edema bilaterally  Lymphatics: No abnormal lymphadenopathy appreciated   Neurologic: No gross deficits         CBC  Results from last 7 days   Lab Units 11/12/23  1603   WBC 10*3/mm3 14.84*   HEMOGLOBIN g/dL 14.4   HEMATOCRIT % 42.0   PLATELETS 10*3/mm3 371       CMP  Results from last 7 days   Lab Units 11/12/23  1603   SODIUM mmol/L 136   POTASSIUM mmol/L 3.6   CHLORIDE mmol/L 98   CO2 mmol/L 26.0   BUN mg/dL 5*   CREATININE mg/dL 0.75   CALCIUM mg/dL 11.1*   BILIRUBIN mg/dL 1.0   ALK PHOS U/L 63   ALT (SGPT) U/L 83*   AST (SGOT) U/L 88*   GLUCOSE mg/dL 163* "       Radiology  Imaging Results (Last 72 Hours)       ** No results found for the last 72 hours. **                Results Review: I have personally reviewed all of the recent lab and imaging results available at this time.     She is afebrile and vital signs are stable    Labs with only a very slight leukocytosis 14,000.  Hemoglobin is 14.  AST and ALT are very borderline elevated at 88 and 83.  Total bilirubin is normal at 1.0    I have personally reviewed a CT scan of the abdomen and pelvis that was completed last night at [facility].  This demonstrates a small amount of expected postsurgical fluid and air in the cholecystectomy bed with out a well-defined fluid collection.  There is some generalized constipation and a small amount of bowel distention    Assessment:  Mrs. Hopkins is a 33-year-old lady with history of PCOS who is postoperative day 3 following laparoscopic cholecystectomy who presents to the emergency department with recurrent abdominal pain.  Her CT scan from last night does demonstrate a small amount of fluid in the cholecystectomy bed, however this is not well-defined and this is quite a small amount and frankly I think that this is somewhat expected postoperative day 2.  Her labs do not demonstrate significant abnormalities of her liver function tests and notably her total bilirubin is normal at 1.0.  Regardless her abdominal pain is recurrent and has not been well controlled.  I think the best course of action is to plan for admission for pain control, fluid resuscitation, and further work-up.  We will plan to obtain an MRCP tonight and depending on these results and her clinical status can consider HIDA scan tomorrow    Plan:  -Admit to the floor  -N.p.o. for imaging studies  -IV fluid resuscitation  -As needed pain control and antiemetics  -We will start a bowel regimen  -Obtain MRCP tonight      I discussed the patient's findings and my recommendations with the patient and/or  family, as well as the primary team     Malick Hackett MD  11/12/23  18:18 EST        Electronically signed by Malick Hackett MD at 11/12/23 1825          Emergency Department Notes        Archie Calderon MD at 11/12/23 1940          Subjective   History of Present Illness    Pt presents for abdominal pain.  She had CCY three days ago.  She has had pain since but yesterday had increase. Diffuse pain, mostly dull but sometimes spikes sharp.  Radiates to the L shoulder. No fever.  She went to ED at [facility] last night/this morning for same and had CT imaging, labs and as given meds.  She was discharged home but the pain has returned and is not controlled so she came up here.  She says since that ED visit she has vomited twice.    History provided by:  Patient      Review of Systems   Constitutional:  Negative for fever.   Respiratory:  Negative for shortness of breath.    Gastrointestinal:  Positive for abdominal pain, constipation, nausea and vomiting. Negative for diarrhea.   All other systems reviewed and are negative.      Past Medical History:   Diagnosis Date    Constipation     Resolved    Generalized anxiety disorder     History of abnormal cervical Pap smear 07/2013    repeat normal per patient    History of colonoscopy 03/10/2014    normal except rectal irritation (bx neg)    History of dog bite 09/2014    left wrist    History of varicella     PCOS (polycystic ovarian syndrome)     Dx 2014.    PCOS (polycystic ovarian syndrome)     Vitamin D deficiency     Dx 4/17       Allergies   Allergen Reactions    Gildess 1.5-30 [Norethindrone-Eth Estradiol] Other (See Comments)     Excessive bleeding    Lexapro [Escitalopram Oxalate] Other (See Comments)     Body tingling and burning feeling  Vomiting excessive crying        Past Surgical History:   Procedure Laterality Date    NO PAST SURGERIES         Family History   Problem Relation Age of Onset    Hypothyroidism Sister         due to thyroidectomy     Thyroid cancer Sister     Diabetes Maternal Uncle     Diabetes Maternal Grandmother     Coronary artery disease Maternal Grandfather     Colon cancer Paternal Grandmother     Diabetes Paternal Grandfather        Social History     Socioeconomic History    Marital status:     Number of children: 0   Tobacco Use    Smoking status: Never    Smokeless tobacco: Never   Vaping Use    Vaping Use: Never used   Substance and Sexual Activity    Alcohol use: Yes     Comment: 1 cocktail 3-4 times per week    Drug use: Not Currently     Comment: experimented with multiple drugs age [age]    Sexual activity: Yes     Partners: Female     Birth control/protection: None           Objective   Physical Exam  Vitals and nursing note reviewed.   Constitutional:       General: She is not in acute distress.     Appearance: Normal appearance. She is not ill-appearing.   HENT:      Head: Normocephalic and atraumatic.      Mouth/Throat:      Mouth: Mucous membranes are moist.   Eyes:      General: No scleral icterus.        Right eye: No discharge.         Left eye: No discharge.      Conjunctiva/sclera: Conjunctivae normal.   Cardiovascular:      Rate and Rhythm: Normal rate and regular rhythm.      Heart sounds: No murmur heard.  Pulmonary:      Effort: Pulmonary effort is normal. No respiratory distress.      Breath sounds: Normal breath sounds. No wheezing.   Abdominal:      General: Bowel sounds are decreased. There is no distension.      Palpations: Abdomen is soft.      Tenderness: There is abdominal tenderness (mild diffuse). There is no guarding or rebound.   Musculoskeletal:         General: No swelling. Normal range of motion.      Cervical back: Normal range of motion and neck supple.   Skin:     General: Skin is warm and dry.      Findings: No rash.   Neurological:      General: No focal deficit present.      Mental Status: She is alert and oriented to person, place, and time. Mental status is at baseline.   Psychiatric:          Mood and Affect: Mood normal.         Behavior: Behavior normal.         Thought Content: Thought content normal.         Procedures          ED Course    I reviewed ED visit from [facility] this morning and I reviewed her CT scan same date.  The CT shows a mild ileus/dilated small bowel loops without SBO.  Her ED note indicates benign eval and improved pain with Toradol/opiates [facility] surgical consult placed.  She was discharged with pain scripts.    Bentyl given here without relief.  Toradol ordered.    I discussed with Dr Encinas who will admit her for symptom mgmt and further eval.    Patient stable on serial rechecks.  Discussed exam findings, test results so far and concerns in detail at the bedside.  Discussed need for admission for further evaluation and treatment.  I discussed the patient's presentation, test results and need for admission with the hospitalist.                                         Medical Decision Making  Problems Addressed:  Acute post-operative pain: complicated acute illness or injury  Ileus: complicated acute illness or injury    Amount and/or Complexity of Data Reviewed  External Data Reviewed: radiology and notes.  Labs: ordered. Decision-making details documented in ED Course.  Discussion of management or test interpretation with external provider(s): General surgery    Risk  Prescription drug management.  Decision regarding hospitalization.        Final diagnoses:   Acute post-operative pain   Ileus       ED Disposition  ED Disposition       ED Disposition   Decision to Admit    Condition   --    Comment   Level of Care: Med/Surg [1]   Diagnosis: Postoperative abdominal pain [[id number]]   Admitting Physician: BLACK ENCINAS [[id number]]                 No follow-up provider specified.       Medication List      No changes were made to your prescriptions during this visit.            Archie Calderon MD  11/12/23 1943      Electronically signed by Archie Calderon MD at 11/12/23  1943       Vital Signs (last 2 days)       Date/Time Temp Temp src Pulse Resp BP Patient Position SpO2    11/15/23 0900 97.3 (36.3) Oral 75 16 107/65 Sitting 97    11/14/23 2219 97.9 (36.6) Oral 88 20 119/76 Sitting 95    11/14/23 1130 97.9 (36.6) Oral 80 18 121/73 Lying 95    11/14/23 1020 -- -- 98 22 105/68 -- 100    11/14/23 1015 -- -- 103 18 104/68 -- 100    11/14/23 1013 97.8 (36.6) Temporal -- 18 120/70 Lying 100    11/14/23 0802 97.2 (36.2) Tympanic 102 16 124/57 Sitting --    11/14/23 0731 99.8 (37.7) Oral 113 19 123/65 Lying 98    11/14/23 0429 98 (36.7) Oral 86 18 102/57 Lying --    11/14/23 0011 97.6 (36.4) Oral 83 18 104/61 Lying --    11/13/23 2151 96.4 (35.8) Oral 86 20 122/88 Lying 98    11/13/23 0832 98 (36.7) Oral 69 18 108/79 Lying 99    11/13/23 0424 97.6 (36.4) Oral 76 18 113/69 Lying --    11/13/23 0012 97.5 (36.4) Oral 76 18 116/64 Lying --          Oxygen Therapy (last 2 days)       Date/Time SpO2 Device (Oxygen Therapy) Flow (L/min) Oxygen Concentration (%) ETCO2 (mmHg)    11/15/23 1000 -- room air -- -- --    11/15/23 0900 97 room air -- -- --    11/15/23 0800 -- room air -- -- --    11/14/23 2219 95 room air -- -- --    11/14/23 2000 -- room air -- -- --    11/14/23 1130 95 room air -- -- --    11/14/23 1020 100 -- -- -- 26    11/14/23 1015 100 room air 0 -- --    11/14/23 1013 100 nasal cannula 2 -- --    11/14/23 0731 98 room air -- -- --    11/13/23 2151 98 room air -- -- --    11/13/23 2000 -- room air -- -- --    11/13/23 1651 -- room air -- -- --    11/13/23 0832 99 room air -- -- --    11/13/23 0830 -- room air -- -- --          Lines, Drains & Airways       Active LDAs       Name Placement date Placement time Site Days    Peripheral IV 11/12/23 1606 Right Antecubital 11/12/23  1606  Antecubital  2                  Current Facility-Administered Medications   Medication Dose Route Frequency Provider Last Rate Last Admin    acetaminophen (TYLENOL) tablet 650 mg  650 mg Oral Q6H PRN  Malick Hackett MD        bisacodyl (DULCOLAX) suppository 10 mg  10 mg Rectal Daily Malick Hackett MD   10 mg at 11/14/23 1139    calcium carbonate (TUMS) chewable tablet 500 mg (200 mg elemental)  2 tablet Oral TID PRN Malick Hackett MD   2 tablet at 11/15/23 1126    docusate sodium (COLACE) capsule 100 mg  100 mg Oral BID Malick Hackett MD   100 mg at 11/15/23 0909    heparin (porcine) 5000 UNIT/ML injection 5,000 Units  5,000 Units Subcutaneous Q8H Malick Hackett MD   5,000 Units at 11/14/23 2221    ibuprofen (ADVIL,MOTRIN) tablet 600 mg  600 mg Oral Q4H PRN Malick Hackett MD   600 mg at 11/15/23 1126    ketorolac (TORADOL) injection 15 mg  15 mg Intravenous Q6H PRN Malick Hackett MD        lactated ringers infusion  30 mL/hr Intravenous Continuous PRN Donte Bird APRN        methylnaltrexone (RELISTOR) injection 8 mg  8 mg Subcutaneous Every Other Day Donte Bird APRN   8 mg at 11/15/23 0909    morphine injection 2 mg  2 mg Intravenous Q2H PRN Malick Hackett MD   2 mg at 11/14/23 1716    And    naloxone (NARCAN) injection 0.4 mg  0.4 mg Intravenous Q5 Min PRN Malick Hackett MD        ondansetron (ZOFRAN) tablet 4 mg  4 mg Oral Q6H PRN Malick Hackett MD   4 mg at 11/12/23 2107    Or    ondansetron (ZOFRAN) injection 4 mg  4 mg Intravenous Q6H PRN Malick Hackett MD   4 mg at 11/14/23 0533    oxyCODONE-acetaminophen (PERCOCET) 5-325 MG per tablet 2 tablet  2 tablet Oral Q4H PRN Malick Hackett MD   2 tablet at 11/14/23 1901    pantoprazole (PROTONIX) EC tablet 40 mg  40 mg Oral Q AM Malick Hackett MD   40 mg at 11/13/23 0615    piperacillin-tazobactam (ZOSYN) 3.375 g in iso-osmotic dextrose 50 ml (premix)  3.375 g Intravenous Q8H Malick Hackett MD   Stopped at 11/15/23 1109    polyethylene glycol (MIRALAX) packet 17 g  17 g Oral Daily Jose Luis Woods MD   17 g at 11/15/23 0909    scopolamine patch 1 mg/72 hr  1 patch Transdermal Once Kushal Churchill  MD YOBANI   1 patch at 11/14/23 0832    Sodium Chloride (PF) 0.9 % 10 mL  10 mL Intravenous PRN Archie Calderon MD         Lab Results (last 48 hours)       Procedure Component Value Units Date/Time    Comprehensive Metabolic Panel [[id number]]  (Abnormal) Collected: 11/15/23 0429    Specimen: Blood Updated: 11/15/23 0521     Glucose 114 mg/dL      BUN 7 mg/dL      Creatinine 0.53 mg/dL      Sodium 137 mmol/L      Potassium 4.3 mmol/L      Chloride 104 mmol/L      CO2 24.0 mmol/L      Calcium 9.0 mg/dL      Total Protein 6.0 g/dL      Albumin 3.5 g/dL      ALT (SGPT) 59 U/L      AST (SGOT) 26 U/L      Alkaline Phosphatase 107 U/L      Total Bilirubin 0.5 mg/dL      Globulin 2.5 gm/dL      Comment: Calculated Result        A/G Ratio 1.4 g/dL      BUN/Creatinine Ratio 13.2     Anion Gap 9.0 mmol/L      eGFR 127.0 mL/min/1.73     Narrative:      GFR Normal >60  Chronic Kidney Disease <60  Kidney Failure <15      CBC (No Diff) [[id number]]  (Abnormal) Collected: 11/15/23 0429    Specimen: Blood Updated: 11/15/23 0456     WBC 7.54 10*3/mm3      RBC 3.61 10*6/mm3      Hemoglobin 11.0 g/dL      Hematocrit 33.7 %      MCV 93.4 fL      MCH 30.5 pg      MCHC 32.6 g/dL      RDW 12.9 %      RDW-SD 44.6 fl      MPV 9.6 fL      Platelets 296 10*3/mm3     Comprehensive Metabolic Panel [[id number]]  (Abnormal) Collected: 11/14/23 0735    Specimen: Blood Updated: 11/14/23 0821     Glucose 124 mg/dL      BUN 5 mg/dL      Creatinine 0.68 mg/dL      Sodium 138 mmol/L      Potassium 4.1 mmol/L      Chloride 102 mmol/L      CO2 26.0 mmol/L      Calcium 9.2 mg/dL      Total Protein 6.9 g/dL      Albumin 4.1 g/dL      ALT (SGPT) 77 U/L      AST (SGOT) 42 U/L      Alkaline Phosphatase 115 U/L      Total Bilirubin 1.3 mg/dL      Globulin 2.8 gm/dL      Comment: Calculated Result        A/G Ratio 1.5 g/dL      BUN/Creatinine Ratio 7.4     Anion Gap 10.0 mmol/L      eGFR 119.6 mL/min/1.73     Narrative:      GFR Normal >60  Chronic Kidney  Disease <60  Kidney Failure <15      CBC (No Diff) [[id number]]  (Normal) Collected: 11/14/23 0735    Specimen: Blood Updated: 11/14/23 0758     WBC 10.57 10*3/mm3      RBC 4.12 10*6/mm3      Hemoglobin 12.8 g/dL      Hematocrit 38.0 %      MCV 92.2 fL      MCH 31.1 pg      MCHC 33.7 g/dL      RDW 13.1 %      RDW-SD 44.3 fl      MPV 9.5 fL      Platelets 318 10*3/mm3           Imaging Results (Last 48 Hours)       Procedure Component Value Units Date/Time    CT Abdomen Pelvis With Contrast [[id number]] Collected: 11/14/23 1816     Updated: 11/14/23 1829    Narrative:      CT ABDOMEN PELVIS W CONTRAST    Date of Exam: 11/14/2023 5:48 PM EST    Indication: Abdominal pain, post-op  postop day 5 lap jeromy with known bile leak s/p ERCP and stent placement today, assess for biloma for fluid collection that would require drainage.    Comparison: 11/12/2023    Technique: Axial CT images were obtained of the abdomen and pelvis following the uneventful intravenous administration of 85 cc Isovue-300. Reconstructed coronal and sagittal images were also obtained. Automated exposure control and iterative   construction methods were used.      Findings:    Lower Chest: Atelectasis in the lung bases    Organs: Status post cholecystectomy. 1.5 cm focal high density fluid collection in the gallbladder fossa, with some low density fluid along the liver margin of the gallbladder fossa. Interval placement of common bile duct stent in good position no   biliary duct dilatation. Pancreas, liver, spleen, kidneys, adrenal glands unremarkable    Gastrointestinal: No abnormality demonstrated.    Pelvis: Small amount of free fluid which has decreased since the prior study. 3 cm left ovarian cyst, likely functional in a patient of this age    Peritoneum/Retroperitoneum: Interval decrease in free fluid adjacent to the inferior tip of the liver. No new free fluid elsewhere in the abdomen. No pneumoperitoneum.    Bones/Soft Tissues: No acute bony       Impression:      There has been interval placement of a biliary stent. There is no biliary duct dilatation. There has been decrease free fluid adjacent to the inferior tip of the liver and in the pelvis, possibly related to known bile leak. There is a new 1.5 cm focal   high density fluid collection in the gallbladder fossa, which may be a small biloma although the density is greater then typical bile density, and a small hematoma is also considered          Electronically Signed: Ryan Fraser    11/14/2023 6:26 PM EST    Workstation ID: [id number]    FL ERCP pancreatic and biliary ducts [[id number]] Collected: 11/14/23 1248     Updated: 11/14/23 1253    Narrative:      FL ERCP PANCREATIC AND BILIARY DUCTS    Date of Exam: 11/14/2023 9:08 AM EST    Indication: ERCP.    Comparison: None available.    Technique:  A series of radiographic digital spot films were obtained in conjunction with a endoscopic catheterization of the biliary and pancreatic ductal system, performed by the gastroenterologist.    Fluoroscopic Time: 2 minutes 52 seconds          Number of Images: 13    Findings:  Dictation is to record 2 minutes and 52 seconds of fluoroscopy time. A total of 13 intraprocedural images were obtained showing endoscope and side cannula placement, contrast injection of the common duct, apparent balloon sweep, and subsequent plastic   stent placement with good drainage of contrast from the biliary ducts. Please see the procedure report for full details.          Impression:      Impression:  Fluoroscopy provided during ERCP.      Electronically Signed: Parish Hyde MD    11/14/2023 12:50 PM EST    Workstation ID: [id number]    NM HIDA SCAN WITHOUT PHARMACOLOGICAL INTERVENTION [[id number]] Collected: 11/13/23 1250     Updated: 11/13/23 1301    Narrative:      DATE OF EXAM: 11/13/2023 11:07 AM EST    PROCEDURE: NM HIDA SCAN WITHOUT PHARMACOLOGICAL INTERVENTION    INDICATIONS: Abdominal pain, upper, recurrent, post  cholecystectomy  postop day 4 lap jeromy, abdominal pain and small fluid collection at GB fossa    COMPARISON: No comparisons available.    TECHNIQUE: [Contrast Amount] mCi of technetium 99m tagged Choletec was administered intravenously. Imaging was obtained per protocol.   Gallbladder stimulation was not performed.     FINDINGS:  There is good hepatic extraction of the radiopharmaceutical. Activity is seen in the common bile duct by 10 minutes. There is a rounded collection of activity in the region of the gallbladder fossa which persist on all images and increases over time.   Additionally there is abnormal activity along the inferior margin of the right lobe of the liver extending towards the right paracolic gutter which is suspicious for bile leak. There is some activity seen within the small bowel and likely a small amount   of reflux of activity into the stomach.        Impression:        1. Abnormal activity seen along the inferior margin of the right lobe of the liver extending towards right paracolic gutter suspicious for bile leak. Additionally there is an increasing rounded amount of activity in the region of the gallbladder fossa   which could potentially be related to biloma.      Electronically Signed: Art Hinds MD    11/13/2023 12:58 PM EST    Workstation ID: [id number]          ECG/EMG Results (last 48 hours)       ** No results found for the last 48 hours. **             Operative/Procedure Notes (last 48 hours)        Procedures signed by Jose Luis Woods MD at 11/14/23 1057         Procedure Orders    1. ERCP [[id number]] ordered by Jose Luis Woods MD at 11/14/23 0855               [Media Unavailable] Scan on 11/14/2023 1057 by Jose Luis Woods MD: ERCP          Electronically signed by Jose Luis Woods MD at 11/14/23 1057          Physician Progress Notes (last 48 hours)        Jose Luis Woods MD at 11/15/23 0825          GI Daily Progress Note  Subjective:    Chief Complaint: Abdominal pain    No  "acute events overnight.  Passing gas and had BM that she reports was loose.  She reports her abdominal pain is markedly improved today following stent placement.  No nausea or emesis.  She would like to try to eat more substantial food today.    Objective:    /76 (BP Location: Left arm, Patient Position: Sitting)   Pulse 88   Temp 97.9 °F (36.6 °C) (Oral)   Resp 20   Ht 157.5 cm (62\")   Wt 82.1 kg (181 lb)   LMP 10/11/2023   SpO2 95%   BMI 33.11 kg/m²     Physical Exam     General: Patient awake, alert and cooperative   Cardiovascular: Regular rate, well-perfused extremities   Pulm: Equal expansion bilaterally, no increased WOB   Abdomen: Soft, nontender, nondistended;               Neuro: A&O, No obvious sign of focal deficit   Psychiatric: Normal mood and behavior; memory intact    Lab  Lab Results   Component Value Date    WBC 7.54 11/15/2023    HGB 11.0 (L) 11/15/2023    HGB 12.8 11/14/2023    HGB 13.3 11/13/2023    MCV 93.4 11/15/2023     11/15/2023       Lab Results   Component Value Date    GLUCOSE 114 (H) 11/15/2023    BUN 7 11/15/2023    CREATININE 0.53 (L) 11/15/2023    EGFRIFNONA 102 04/26/2018    BCR 13.2 11/15/2023     11/15/2023    K 4.3 11/15/2023    CO2 24.0 11/15/2023    CALCIUM 9.0 11/15/2023    ALBUMIN 3.5 11/15/2023    ALKPHOS 107 11/15/2023    BILITOT 0.5 11/15/2023    ALT 59 (H) 11/15/2023    AST 26 11/15/2023       Assessment:  Abdominal Pain  Nausea and vomiting  Status post cholecystectomy  Elevated LFTs  HIDA scan concerning for bile leak  Postop constipation    Plan:  -Status post ERCP 11/14 with no clear evidence of extravasation however given her elevated LFTs and symptoms along with HIDA scan findings decision was made to place plastic biliary stent following biliary sphincterotomy.  Perhaps she had small accessory duct low-grade leak that was improving by the time of ERCP.  -LFTs improved today, hemoglobin with decline today along with all other cell lines " "may have some dilutional component  -Complete course of antibiotics  -Advance diet as tolerated  -Continue bowel regimen, will repeat dose of Relistor today and she should continue on daily MiraLAX  -Repeat ERCP in 6 weeks to remove stent    I discussed findings recommendations with patient and bedside nurse this morning.    Jose Luis Woods MD  11/15/23  08:26 EST       Electronically signed by Jose Luis Woods MD at 11/15/23 0929       Malick Hackett MD at 11/15/23 0706          Patient Name:  Martina Hopkins  YOB: 1990  [id number]    Surgery Progress Note    Date of visit: 11/15/2023      Subjective: No acute events.  Feels much better since ERCP.  Reports significantly less pain and cramping in the upper abdomen.  Not require any pain meds overnight.  Passed flatus and had 1 small bowel movement.  Denies nausea or vomiting.  Tells me she has an appetite this morning.          Objective:     /76 (BP Location: Left arm, Patient Position: Sitting)   Pulse 88   Temp 97.9 °F (36.6 °C) (Oral)   Resp 20   Ht 157.5 cm (62\")   Wt 82.1 kg (181 lb)   LMP 10/11/2023   SpO2 95%   BMI 33.11 kg/m²     Intake/Output Summary (Last 24 hours) at 11/15/2023 0706  Last data filed at 11/14/2023 1002  Gross per 24 hour   Intake 1000 ml   Output --   Net 1000 ml       GEN:   Awake, alert, in no acute distress, resting comfortably in bed   CV:   Regular rate and rhythm  L:  Symmetric expansion, not labored on room air  Abd:  Soft, not obviously distended, incisions are clean dry intact with only small amount of ecchymosis surrounding incision sites, only very mild tenderness to deep palpation in the upper abdomen and this is improved  Ext:  No cyanosis, clubbing, or edema    Recent labs that are back at this time have been reviewed.           Assessment/ Plan:    Mrs. Hopkins is a 33-year-old lady with history of PCOS who is postoperative day 3 following laparoscopic cholecystectomy who presents to the " "emergency department with recurrent abdominal pain.         #Bile leak status postcholecystectomy  -Postoperative day 6 following lap jeromy  -Postop day 1 following ERCP and stent placement  -Labs show improvement.  White blood cell count is 7.  Bilirubin is 0.5.  Transaminases are down  -Clinically she is feeling much better  -I repeated a CT scan overnight to assess for a biloma or any fluid collection that would require drainage.  There is very minimal fluid and this actually shows improvement as compared to her scan from Saturday.  I have considered laparoscopic washout for her, however with her CT scan findings and the fact that she is clinically improving I do not think that this is warranted at this point.  -Continue antibiotics  -Advance to regular diet  -Hopefully be ready to go home on oral antibiotics after she has more reliable bowel function in the next 24 to 48 hours       Malick Hackett MD  11/15/2023  07:06 EST        Electronically signed by Malick Hackett MD at 11/15/23 0708       Malick Hackett MD at 11/14/23 0822          Patient Name:  Martina Hopkins  YOB: 1990  [id number]    Surgery Progress Note    Date of visit: 11/14/2023      Subjective: No acute events overnight.  Still has epigastric and right upper quadrant pain.  Not significantly improved.  No nausea or vomiting.  No bowel movements overnight.          Objective:     /57 (BP Location: Left arm, Patient Position: Sitting)   Pulse 102   Temp 97.2 °F (36.2 °C) (Tympanic)   Resp 16   Ht 157.5 cm (62\")   Wt 82.1 kg (181 lb)   LMP 10/11/2023   SpO2 98%   BMI 33.11 kg/m²     Intake/Output Summary (Last 24 hours) at 11/14/2023 0822  Last data filed at 11/13/2023 2147  Gross per 24 hour   Intake 50 ml   Output --   Net 50 ml       GEN:   Awake, alert, in no acute distress, resting comfortably in wheelchair getting ready to go to Endo  CV:   Regular rate and rhythm  L:  Symmetric expansion, not labored " "on room air  Abd:  Soft, tender to palpation throughout the epigastrium and right upper quadrant, incisions are clean dry intact  Ext:  No cyanosis, clubbing, or edema    Recent labs that are back at this time have been reviewed.           Assessment/ Plan:    Mrs. Hopkins is a 33-year-old lady with history of PCOS who is postoperative day 3 following laparoscopic cholecystectomy who presents to the emergency department with recurrent abdominal pain.        #Bile leak status postcholecystectomy  -Postoperative day 5 following lap jeromy  -HIDA scan yesterday was concerning for bile leak  -LFTs are up slightly today congruent with bile leak.  White blood cell count is down slightly  -GI has been consulted and is planning for ERCP today  -Continue Zosyn  -Continue bowel regimen and pain control  -We will follow up results of ERCP.  May require laparoscopic washout         Malick Hackett MD  11/14/2023  08:22 EST        Electronically signed by Malick Hackett MD at 11/14/23 0824       Malick Hackett MD at 11/13/23 1220          Patient Name:  Martina Hopkins  YOB: 1990  [id number]    Surgery Progress Note    Date of visit: 11/13/2023      Subjective: Persistent abdominal pain overnight.  She reports some nausea and one episode of dry heaves.  No fevers.  No flatus or bowel movement overnight despite taking milk of mag.  Reports the pain is located mostly in the right upper quadrant.          Objective:     /79 (BP Location: Left arm, Patient Position: Lying)   Pulse 69   Temp 98 °F (36.7 °C) (Oral)   Resp 18   Ht 157.5 cm (62\")   Wt 82.1 kg (181 lb)   LMP 10/11/2023   SpO2 99%   BMI 33.11 kg/m²     Intake/Output Summary (Last 24 hours) at 11/13/2023 1223  Last data filed at 11/13/2023 0600  Gross per 24 hour   Intake 2000 ml   Output --   Net 2000 ml       GEN:   Awake, alert, in no acute distress, resting comfortably in bed   CV:   Regular rate and rhythm  L:  Symmetric expansion, " not labored on room air  Abd:  Soft, generalized nonfocal tenderness palpation throughout the epigastrium and right upper quadrant, no rebound or guarding, not obviously distended, incisions are clean dry and intact with only a small amount of ecchymosis  Ext:  No cyanosis, clubbing, or edema    Recent labs that are back at this time have been reviewed.           Assessment/ Plan:    Mrs. Hopkins is a 33-year-old lady with history of PCOS who is postoperative day 3 following laparoscopic cholecystectomy who presents to the emergency department with recurrent abdominal pain.     #Intractable abdominal pain status postcholecystectomy  -Postoperative day 4 following lap jeromy  -Vital signs are stable  -Repeat labs without significant abnormality.  White blood cell count is 11.  Total bili is normal at 1.1.  Transaminases are very mildly elevated at 49/83  -MRCP was completed last night.  This demonstrates no obvious retained stone or other abnormality  -We will plan to obtain a HIDA scan today   -We will escalate bowel regimen as she has not had bowel movement since surgery and does describe quite a bit of bloating.  Suppository today      Malick Hcakett MD  11/13/2023  12:23 EST        Electronically signed by Malick Hackett MD at 11/13/23 1225          Consult Notes (last 48 hours)        Donte Bird APRN at 11/13/23 1725       Attestation signed by Jose Luis Woods MD at 11/13/23 1841    I have reviewed this documentation and agree.                    [facility] [place] [facility] Consult    Referring Provider: No ref. provider found    PCP: Taylor Ventura MD    Reason for Consultation: Bile leak, needs ERCP    Chief complaint: Abdominal pain    History of present illness:    Martina Hopkins is a 33 y.o. female who is admitted with worsening right upper quadrant abdominal pain with associated nausea.  Patient is 4 days postop from a laparoscopic cholecystectomy per Dr. Hackett; notes feeling okay initially but has  had worsening pain for the past 2 days with associated nausea and inability maintaining oral intake.  As symptoms persisted, patient presented to local ER with unremarkable findings prompting her to seek medical attention back here at this facility where her general surgeon is.    At time of exam, reports ongoing issues with abdominal pain requiring as needed narcotic medications for control.  Still with nausea but no vomiting.  Reports constipation following her surgical procedure.  No shortness of breath, chest pain, or fever/chills.  Work-up at time of admission reviewed; liver enzymes mildly elevated as would expect following a cholecystectomy-HIDA scan concerning for bile leak. Past medical, surgical, social, and family histories are reviewed for accuracy.  No documented alleviating or exacerbating factors.  Does not endorse pain at time of exam.    Allergies:  Gildess 1.5-30 [norethindrone-eth estradiol] and Lexapro [escitalopram oxalate]    Scheduled Meds:  bisacodyl, 10 mg, Rectal, Daily  docusate sodium, 100 mg, Oral, BID  heparin (porcine), 5,000 Units, Subcutaneous, Q8H  ketorolac, 30 mg, Intravenous, Q6H  pantoprazole, 40 mg, Oral, Q AM  piperacillin-tazobactam, 3.375 g, Intravenous, Q8H  polyethylene glycol, 17 g, Oral, Daily         Infusions:  lactated ringers, 125 mL/hr, Last Rate: 125 mL/hr (11/13/23 6625)        PRN Meds:    Morphine **AND** naloxone    ondansetron **OR** ondansetron    oxyCODONE-acetaminophen    Sodium Chloride (PF)    Home Meds:  Medications Prior to Admission   Medication Sig Dispense Refill Last Dose    benzonatate (Tessalon Perles) 100 MG capsule Take 1 capsule by mouth 3 (Three) Times a Day As Needed for Cough. 30 capsule 0     docusate sodium (Colace) 100 MG capsule Take 1 capsule by mouth 2 (Two) Times a Day. 30 capsule 0     famotidine (Pepcid) 20 MG tablet Take 1 tablet by mouth 2 (Two) Times a Day. 60 tablet 0     fluticasone (FLONASE) 50 MCG/ACT nasal spray 2 sprays  into the nostril(s) as directed by provider Daily for 7 days. 9.9 mL 0     ketorolac (TORADOL) 10 MG tablet Take 1 tablet by mouth Every 6 (Six) Hours As Needed for Moderate Pain. 20 tablet 0     meclizine (ANTIVERT) 25 MG tablet Take 1 tablet by mouth 3 (Three) Times a Day As Needed for Dizziness. 21 tablet 0     methylPREDNISolone (MEDROL) 4 MG dose pack Take as directed on package instructions. 21 tablet 0     ondansetron (ZOFRAN) 4 MG tablet Take 1 tablet by mouth Every 6 (Six) Hours As Needed. 12 tablet 0     oxyCODONE-acetaminophen (Percocet) 5-325 MG per tablet Take 1 tablet by mouth Every 4 (Four) to 6 (Six) Hours As Needed for Severe Pain. 12 tablet 0     promethazine-dextromethorphan (PROMETHAZINE-DM) 6.25-15 MG/5ML syrup Take 5 mL by mouth 4 (Four) Times a Day As Needed for Cough. 60 mL 0        ROS: Review of Systems   Constitutional:  Positive for activity change and appetite change. Negative for chills, diaphoresis, fatigue, fever and unexpected weight change.   HENT:  Negative for sore throat, trouble swallowing and voice change.    Eyes: Negative.    Respiratory:  Negative for apnea, cough, choking, chest tightness, shortness of breath, wheezing and stridor.    Cardiovascular:  Negative for chest pain, palpitations and leg swelling.   Gastrointestinal:  Positive for abdominal pain, nausea and vomiting. Negative for abdominal distention, anal bleeding, blood in stool, constipation, diarrhea and rectal pain.   Endocrine: Negative.    Genitourinary: Negative.    Musculoskeletal: Negative.    Skin: Negative.    Allergic/Immunologic: Negative.    Neurological: Negative.    Hematological: Negative.    Psychiatric/Behavioral: Negative.     All other systems reviewed and are negative.      PAST MED HX:  Past Medical History:   Diagnosis Date    Constipation     Resolved    Generalized anxiety disorder     History of abnormal cervical Pap smear 07/2013    repeat normal per patient    History of colonoscopy  "03/10/2014    normal except rectal irritation (bx neg)    History of dog bite 09/2014    left wrist    History of varicella     PCOS (polycystic ovarian syndrome)     Dx 2014.    PCOS (polycystic ovarian syndrome)     Vitamin D deficiency     Dx 4/17       PAST SURG HX:  Past Surgical History:   Procedure Laterality Date    NO PAST SURGERIES         FAM HX:  Family History   Problem Relation Age of Onset    Hypothyroidism Sister         due to thyroidectomy    Thyroid cancer Sister     Diabetes Maternal Uncle     Diabetes Maternal Grandmother     Coronary artery disease Maternal Grandfather     Colon cancer Paternal Grandmother     Diabetes Paternal Grandfather        SOC HX:  Social History     Socioeconomic History    Marital status:     Number of children: 0   Tobacco Use    Smoking status: Never    Smokeless tobacco: Never   Vaping Use    Vaping Use: Never used   Substance and Sexual Activity    Alcohol use: Yes     Comment: 1 cocktail 3-4 times per week    Drug use: Not Currently     Comment: experimented with multiple drugs age [age]    Sexual activity: Yes     Partners: Female     Birth control/protection: None       PHYSICAL EXAM  /79 (BP Location: Left arm, Patient Position: Lying)   Pulse 69   Temp 98 °F (36.7 °C) (Oral)   Resp 18   Ht 157.5 cm (62\")   Wt 82.1 kg (181 lb)   LMP 10/11/2023   SpO2 99%   BMI 33.11 kg/m²   Wt Readings from Last 3 Encounters:   11/12/23 82.1 kg (181 lb)   11/12/23 82.1 kg (181 lb)   09/13/23 82.1 kg (181 lb)   ,body mass index is 33.11 kg/m².  Physical Exam  Vitals and nursing note reviewed.   Constitutional:       General: She is not in acute distress.     Appearance: Normal appearance. She is normal weight. She is not ill-appearing or toxic-appearing.   HENT:      Head: Normocephalic and atraumatic.   Eyes:      General: No scleral icterus.     Extraocular Movements: Extraocular movements intact.      Conjunctiva/sclera: Conjunctivae normal.      Pupils: " "Pupils are equal, round, and reactive to light.   Cardiovascular:      Rate and Rhythm: Normal rate and regular rhythm.      Pulses: Normal pulses.      Heart sounds: Normal heart sounds.   Pulmonary:      Effort: Pulmonary effort is normal. No respiratory distress.      Breath sounds: Normal breath sounds.   Abdominal:      General: Abdomen is flat. Bowel sounds are normal. There is no distension.      Palpations: Abdomen is soft. There is no mass.      Tenderness: There is abdominal tenderness. There is no guarding or rebound.      Hernia: No hernia is present.      Comments: Laparoscopic incision appreciated on abdomen   Musculoskeletal:         General: Normal range of motion.      Right lower leg: No edema.      Left lower leg: No edema.   Skin:     General: Skin is warm and dry.      Capillary Refill: Capillary refill takes less than 2 seconds.      Coloration: Skin is pale. Skin is not jaundiced.   Neurological:      General: No focal deficit present.      Mental Status: She is alert and oriented to person, place, and time.   Psychiatric:         Mood and Affect: Mood normal.         Behavior: Behavior normal.         Thought Content: Thought content normal.         Judgment: Judgment normal.       Note, physical exam completed by MAGALIS Carter Student under my direct supervision.     Results Review:   I reviewed the patient's new clinical results.  I reviewed the patient's new imaging results and agree with the interpretation.  I reviewed the patient's other test results and agree with the interpretation  I personally viewed and interpreted the patient's EKG/Telemetry data    Lab Results   Component Value Date    WBC 11.01 (H) 11/13/2023    HGB 13.3 11/13/2023    HGB 14.4 11/12/2023    HGB 13.2 11/12/2023    HCT 39.1 11/13/2023    MCV 90.1 11/13/2023     11/13/2023       No results found for: \"INR\"    Lab Results   Component Value Date    GLUCOSE 120 (H) 11/13/2023    BUN 5 (L) 11/13/2023    " CREATININE 0.71 11/13/2023    EGFRIFNONA 102 04/26/2018    BCR 7.0 11/13/2023     11/13/2023    K 3.7 11/13/2023    CO2 29.0 11/13/2023    CALCIUM 10.5 11/13/2023    ALBUMIN 4.2 11/13/2023    ALKPHOS 66 11/13/2023    BILITOT 1.1 11/13/2023    ALT 83 (H) 11/13/2023    AST 49 (H) 11/13/2023       NM HIDA SCAN WITHOUT PHARMACOLOGICAL INTERVENTION    Result Date: 11/13/2023  DATE OF EXAM: 11/13/2023 11:07 AM EST PROCEDURE: NM HIDA SCAN WITHOUT PHARMACOLOGICAL INTERVENTION INDICATIONS: Abdominal pain, upper, recurrent, post cholecystectomy postop day 4 lap jeromy, abdominal pain and small fluid collection at GB fossa COMPARISON: No comparisons available. TECHNIQUE: [Contrast Amount] mCi of technetium 99m tagged Choletec was administered intravenously. Imaging was obtained per protocol. Gallbladder stimulation was not performed. FINDINGS: There is good hepatic extraction of the radiopharmaceutical. Activity is seen in the common bile duct by 10 minutes. There is a rounded collection of activity in the region of the gallbladder fossa which persist on all images and increases over time. Additionally there is abnormal activity along the inferior margin of the right lobe of the liver extending towards the right paracolic gutter which is suspicious for bile leak. There is some activity seen within the small bowel and likely a small amount of reflux of activity into the stomach.     1. Abnormal activity seen along the inferior margin of the right lobe of the liver extending towards right paracolic gutter suspicious for bile leak. Additionally there is an increasing rounded amount of activity in the region of the gallbladder fossa which could potentially be related to biloma. Electronically Signed: Art Hinds MD  11/13/2023 12:58 PM EST  Workstation ID: [id number]    MRI abdomen wo contrast mrcp    Result Date: 11/12/2023  MRI ABDOMEN WO CONTRAST MRCP Date of Exam: 11/12/2023 7:03 PM EST Indication: Biliary obstruction  suspected (Ped 0-17y) postop day 3 lap chlole, intractable abdominal pain, slight transaminase elevation, eval for retained stone.  Comparison: CT of the abdomen and pelvis dated 11/12/2023 Technique:  Routine multiplanar/multisequence images of the abdomen were obtained with MRCP sequences without contrast administration. Findings: The liver is unremarkable in morphology and signal intensity of the obtained sequences. No focal liver lesion is seen. No biliary dilation is seen. The CBD measures approximately 6 mm at the level of the phong hepatis. No intraductal filling defect is seen to suggest choledocholithiasis. The gallbladder is absent. Limited visualization of the pancreas is grossly unremarkable. The spleen, adrenal glands, and kidneys are grossly unremarkable. No hydronephrosis is seen.  There is mild perihepatic and perisplenic fluid. Mild fluid in the gallbladder fossa. Visualized spinal structures demonstrate no acute abnormality.     Impression: 1.Status post cholecystectomy. No biliary dilation or intraductal filling defects identified to suggest choledocholithiasis. Please correlate with serum bilirubin levels. 2.There is mild perihepatic and perisplenic fluid. Mild fluid in the gallbladder fossa. Electronically Signed: Dillon Figueredo MD  11/12/2023 7:29 PM EST  Workstation ID: [id number]    CT Abdomen Pelvis With Contrast    Result Date: 11/12/2023  FINAL REPORT TECHNIQUE: null CLINICAL HISTORY: lap jeromy 3 days ago, increaing RUQ pain, constipation, nausea COMPARISON: null FINDINGS: Exam: CT abdomen and pelvis with IV contrast. Comparison: None Findings: No free air. No solid liver mass. Cholecystectomy. Moderate volume fluid in the surgical bed. No defined collection to suggest abscess. Small volume abdominal and pelvic ascites. No clinically significant biliary ductal dilation. No splenomegaly or suspicious splenic lesions. No solid or cystic pancreatic mass. No pancreatic ductal dilation. No acute  inflammatory changes. Adrenal glands without nodule. No suspicious renal mass. No hydronephrosis. No significant stranding. Bladder without acute pathology. Dense colonic fecal retention without obstruction. Mild fluid distention of scattered small bowel loops without transition, likely indicative of mild postoperative ileus. No evidence for acute appendicitis. No adenopathy. No abdominal aortic aneurysm. No suspicious pelvic masses. No acute soft tissue pathology. No acute osseous pathology.     IMPRESSION: Moderate fluid in the cholecystectomy bed without defined collection. This finding is non-specific given only post-operative day three and fluid may simply be post-operative. Consider HIDA scan correlation to exclude biliary leak. Dense colonic fecal retention without obstruction. Mild fluid distention of scattered small bowel loops without transition, likely indicative of mild postoperative ileus. Authenticated and Electronically Signed by Xochilt Baer MD on 11/12/2023 03:34:24 AM    COVID19   Date Value Ref Range Status   12/02/2022 Not Detected  Final     ASSESSMENTS/PLANS  1.  Postop bile leak  2.  Intractable right upper quadrant abdominal pain, related to above  3.  Non-intractable nausea/vomiting, related to above  4.  S/p laparoscopic cholecystectomy, POD 4  5.  Constipation, drug-induced    Martina DEMARCUS Hopkins is a 33 y.o. female who presents to hospital with worsening abdominal pain with associated nausea; patient is 4 days postop from a laparoscopic cholecystectomy.  HIDA scan films reviewed and are concerning for postop bile leak.  Given this finding, recommend ERCP with stent deployment; unfortunately, no anesthesia available to accomplish this today-we will plan for first thing tomorrow morning.    >>> N.p.o. at midnight  >>> Obtain informed consent for endoscopic retrograde cholangiopancreatography with biliary stent appointment  >>> Continue antiemetics and pain control measures  >>> Okay for  liquids tonight and n.p.o. at midnight  >>> IV Zosyn 3.375 g for intra-abdominal infection prophylaxis  >>> GI prophylaxis PPI  >>> Given patient's constipation following her recent anesthesia exposure and ongoing use of narcotics, will give  Methylnaltrexone 8 mg subcu x1  Milk of mag x1  Continue daily docusate 100 mg p.o.    I discussed the patient's findings and my recommendations with patient, family, nursing staff, and consulting provider    Donte Bird, MAGALIS  11/13/23  17:25 EST      Electronically signed by Jose Luis Woods MD at 11/13/23 9847

## 2023-11-15 NOTE — PLAN OF CARE
Problem: Adult Inpatient Plan of Care  Goal: Plan of Care Review  Outcome: Ongoing, Progressing  Goal: Patient-Specific Goal (Individualized)  Outcome: Ongoing, Progressing  Goal: Absence of Hospital-Acquired Illness or Injury  Outcome: Ongoing, Progressing  Intervention: Identify and Manage Fall Risk  Recent Flowsheet Documentation  Taken 11/15/2023 1400 by Rubia Connell RN  Safety Promotion/Fall Prevention:   activity supervised   assistive device/personal items within reach   clutter free environment maintained   toileting scheduled   safety round/check completed   room organization consistent  Taken 11/15/2023 1200 by Rubia Connell RN  Safety Promotion/Fall Prevention:   activity supervised   assistive device/personal items within reach   clutter free environment maintained   toileting scheduled   safety round/check completed   room organization consistent  Taken 11/15/2023 1000 by Rubia Connell RN  Safety Promotion/Fall Prevention:   activity supervised   assistive device/personal items within reach   clutter free environment maintained   safety round/check completed   toileting scheduled   room organization consistent  Taken 11/15/2023 0800 by Rubia Connell RN  Safety Promotion/Fall Prevention:   activity supervised   clutter free environment maintained   assistive device/personal items within reach   toileting scheduled   safety round/check completed   room organization consistent  Intervention: Prevent Skin Injury  Recent Flowsheet Documentation  Taken 11/15/2023 1400 by Rubia Connell RN  Body Position: position changed independently  Skin Protection:   adhesive use limited   tubing/devices free from skin contact   transparent dressing maintained   skin-to-skin areas padded   skin-to-device areas padded  Taken 11/15/2023 1200 by Rubia Connell RN  Body Position: position changed independently  Skin Protection:   adhesive use limited   tubing/devices free from skin contact    transparent dressing maintained   skin-to-skin areas padded   skin-to-device areas padded  Taken 11/15/2023 1000 by Rubia Connell RN  Body Position: position changed independently  Skin Protection:   adhesive use limited   tubing/devices free from skin contact   transparent dressing maintained   skin-to-skin areas padded   skin-to-device areas padded  Taken 11/15/2023 0800 by Rubia Connell RN  Body Position: position changed independently  Skin Protection:   adhesive use limited   tubing/devices free from skin contact   transparent dressing maintained   skin-to-skin areas padded   skin-to-device areas padded  Intervention: Prevent and Manage VTE (Venous Thromboembolism) Risk  Recent Flowsheet Documentation  Taken 11/15/2023 1400 by Rubia Connell RN  Activity Management:   up ad dayday   ambulated to bathroom  Taken 11/15/2023 1200 by Rubia Connell RN  Activity Management:   up ad dayday   ambulated in room  Taken 11/15/2023 1000 by Rubia Connell RN  Activity Management:   up ad dayday   ambulated in room  Taken 11/15/2023 0800 by Rubia Connell RN  Activity Management:   up ad dayday   ambulated in room  Goal: Optimal Comfort and Wellbeing  Outcome: Ongoing, Progressing  Intervention: Provide Person-Centered Care  Recent Flowsheet Documentation  Taken 11/15/2023 0800 by Rubia Connell RN  Trust Relationship/Rapport: care explained  Goal: Readiness for Transition of Care  Outcome: Ongoing, Progressing     Problem: Pain Acute  Goal: Acceptable Pain Control and Functional Ability  Outcome: Ongoing, Progressing     Problem: Adjustment to Illness (Sepsis/Septic Shock)  Goal: Optimal Coping  Outcome: Ongoing, Progressing     Problem: Bleeding (Sepsis/Septic Shock)  Goal: Absence of Bleeding  Outcome: Ongoing, Progressing     Problem: Glycemic Control Impaired (Sepsis/Septic Shock)  Goal: Blood Glucose Level Within Desired Range  Outcome: Ongoing, Progressing     Problem: Infection Progression (Sepsis/Septic  Shock)  Goal: Absence of Infection Signs and Symptoms  Outcome: Ongoing, Progressing  Intervention: Promote Recovery  Recent Flowsheet Documentation  Taken 11/15/2023 1400 by Rubia Connell, RN  Activity Management:   up ad dayday   ambulated to bathroom  Taken 11/15/2023 1200 by Rubia Connell, RN  Activity Management:   up ad dayday   ambulated in room  Taken 11/15/2023 1000 by Rubia Connell, RN  Activity Management:   up ad dayday   ambulated in room  Taken 11/15/2023 0800 by Rubia Connell, RN  Activity Management:   up ad dayday   ambulated in room     Problem: Nutrition Impaired (Sepsis/Septic Shock)  Goal: Optimal Nutrition Intake  Outcome: Ongoing, Progressing   Goal Outcome Evaluation:

## 2023-11-15 NOTE — PROGRESS NOTES
"GI Daily Progress Note  Subjective:    Chief Complaint: Abdominal pain    No acute events overnight.  Passing gas and had BM that she reports was loose.  She reports her abdominal pain is markedly improved today following stent placement.  No nausea or emesis.  She would like to try to eat more substantial food today.    Objective:    /76 (BP Location: Left arm, Patient Position: Sitting)   Pulse 88   Temp 97.9 °F (36.6 °C) (Oral)   Resp 20   Ht 157.5 cm (62\")   Wt 82.1 kg (181 lb)   LMP 10/11/2023   SpO2 95%   BMI 33.11 kg/m²     Physical Exam     General: Patient awake, alert and cooperative   Cardiovascular: Regular rate, well-perfused extremities   Pulm: Equal expansion bilaterally, no increased WOB   Abdomen: Soft, nontender, nondistended;               Neuro: A&O, No obvious sign of focal deficit   Psychiatric: Normal mood and behavior; memory intact    Lab  Lab Results   Component Value Date    WBC 7.54 11/15/2023    HGB 11.0 (L) 11/15/2023    HGB 12.8 11/14/2023    HGB 13.3 11/13/2023    MCV 93.4 11/15/2023     11/15/2023       Lab Results   Component Value Date    GLUCOSE 114 (H) 11/15/2023    BUN 7 11/15/2023    CREATININE 0.53 (L) 11/15/2023    EGFRIFNONA 102 04/26/2018    BCR 13.2 11/15/2023     11/15/2023    K 4.3 11/15/2023    CO2 24.0 11/15/2023    CALCIUM 9.0 11/15/2023    ALBUMIN 3.5 11/15/2023    ALKPHOS 107 11/15/2023    BILITOT 0.5 11/15/2023    ALT 59 (H) 11/15/2023    AST 26 11/15/2023       Assessment:  Abdominal Pain  Nausea and vomiting  Status post cholecystectomy  Elevated LFTs  HIDA scan concerning for bile leak  Postop constipation    Plan:  -Status post ERCP 11/14 with no clear evidence of extravasation however given her elevated LFTs and symptoms along with HIDA scan findings decision was made to place plastic biliary stent following biliary sphincterotomy.  Perhaps she had small accessory duct low-grade leak that was improving by the time of ERCP.  -LFTs " improved today, hemoglobin with decline today along with all other cell lines may have some dilutional component  -Complete course of antibiotics  -Advance diet as tolerated  -Continue bowel regimen, will repeat dose of Relistor today and she should continue on daily MiraLAX  -Repeat ERCP in 6 weeks to remove stent    I discussed findings recommendations with patient and bedside nurse this morning.    Jose Luis Woods MD  11/15/23  08:26 EST

## 2023-11-15 NOTE — PROGRESS NOTES
"Patient Name:  Martina Hopkins  YOB: 1990  [id number]    Surgery Progress Note    Date of visit: 11/15/2023      Subjective: No acute events.  Feels much better since ERCP.  Reports significantly less pain and cramping in the upper abdomen.  Not require any pain meds overnight.  Passed flatus and had 1 small bowel movement.  Denies nausea or vomiting.  Tells me she has an appetite this morning.          Objective:     /76 (BP Location: Left arm, Patient Position: Sitting)   Pulse 88   Temp 97.9 °F (36.6 °C) (Oral)   Resp 20   Ht 157.5 cm (62\")   Wt 82.1 kg (181 lb)   LMP 10/11/2023   SpO2 95%   BMI 33.11 kg/m²     Intake/Output Summary (Last 24 hours) at 11/15/2023 0706  Last data filed at 11/14/2023 1002  Gross per 24 hour   Intake 1000 ml   Output --   Net 1000 ml       GEN:   Awake, alert, in no acute distress, resting comfortably in bed   CV:   Regular rate and rhythm  L:  Symmetric expansion, not labored on room air  Abd:  Soft, not obviously distended, incisions are clean dry intact with only small amount of ecchymosis surrounding incision sites, only very mild tenderness to deep palpation in the upper abdomen and this is improved  Ext:  No cyanosis, clubbing, or edema    Recent labs that are back at this time have been reviewed.           Assessment/ Plan:    Mrs. Hopkins is a 33-year-old lady with history of PCOS who is postoperative day 3 following laparoscopic cholecystectomy who presents to the emergency department with recurrent abdominal pain.         #Bile leak status postcholecystectomy  -Postoperative day 6 following lap jeromy  -Postop day 1 following ERCP and stent placement  -Labs show improvement.  White blood cell count is 7.  Bilirubin is 0.5.  Transaminases are down  -Clinically she is feeling much better  -I repeated a CT scan overnight to assess for a biloma or any fluid collection that would require drainage.  There is very minimal fluid and this actually shows " improvement as compared to her scan from Saturday.  I have considered laparoscopic washout for her, however with her CT scan findings and the fact that she is clinically improving I do not think that this is warranted at this point.  -Continue antibiotics  -Advance to regular diet  -Hopefully be ready to go home on oral antibiotics after she has more reliable bowel function in the next 24 to 48 hours       Malick Hackett MD  11/15/2023  07:06 EST

## 2023-11-16 ENCOUNTER — READMISSION MANAGEMENT (OUTPATIENT)
Dept: CALL CENTER | Facility: HOSPITAL | Age: 33
End: 2023-11-16
Payer: MEDICAID

## 2023-11-16 VITALS
OXYGEN SATURATION: 96 % | BODY MASS INDEX: 33.31 KG/M2 | WEIGHT: 181 LBS | SYSTOLIC BLOOD PRESSURE: 103 MMHG | TEMPERATURE: 97.6 F | HEART RATE: 76 BPM | RESPIRATION RATE: 18 BRPM | HEIGHT: 62 IN | DIASTOLIC BLOOD PRESSURE: 62 MMHG

## 2023-11-16 LAB
ALBUMIN SERPL-MCNC: 3.6 G/DL (ref 3.5–5.2)
ALBUMIN/GLOB SERPL: 2 G/DL
ALP SERPL-CCNC: 92 U/L (ref 39–117)
ALT SERPL W P-5'-P-CCNC: 44 U/L (ref 1–33)
ANION GAP SERPL CALCULATED.3IONS-SCNC: 9 MMOL/L (ref 5–15)
AST SERPL-CCNC: 14 U/L (ref 1–32)
BASOPHILS # BLD AUTO: 0.03 10*3/MM3 (ref 0–0.2)
BASOPHILS NFR BLD AUTO: 0.5 % (ref 0–1.5)
BILIRUB SERPL-MCNC: 0.4 MG/DL (ref 0–1.2)
BUN SERPL-MCNC: 8 MG/DL (ref 6–20)
BUN/CREAT SERPL: 12.7 (ref 7–25)
CALCIUM SPEC-SCNC: 8.8 MG/DL (ref 8.6–10.5)
CHLORIDE SERPL-SCNC: 104 MMOL/L (ref 98–107)
CO2 SERPL-SCNC: 25 MMOL/L (ref 22–29)
CREAT SERPL-MCNC: 0.63 MG/DL (ref 0.57–1)
DEPRECATED RDW RBC AUTO: 44.6 FL (ref 37–54)
EGFRCR SERPLBLD CKD-EPI 2021: 121.8 ML/MIN/1.73
EOSINOPHIL # BLD AUTO: 0.25 10*3/MM3 (ref 0–0.4)
EOSINOPHIL NFR BLD AUTO: 4.5 % (ref 0.3–6.2)
ERYTHROCYTE [DISTWIDTH] IN BLOOD BY AUTOMATED COUNT: 13 % (ref 12.3–15.4)
GLOBULIN UR ELPH-MCNC: 1.8 GM/DL
GLUCOSE SERPL-MCNC: 97 MG/DL (ref 65–99)
HCT VFR BLD AUTO: 31.7 % (ref 34–46.6)
HGB BLD-MCNC: 10.3 G/DL (ref 12–15.9)
IMM GRANULOCYTES # BLD AUTO: 0.02 10*3/MM3 (ref 0–0.05)
IMM GRANULOCYTES NFR BLD AUTO: 0.4 % (ref 0–0.5)
LYMPHOCYTES # BLD AUTO: 2.47 10*3/MM3 (ref 0.7–3.1)
LYMPHOCYTES NFR BLD AUTO: 44.2 % (ref 19.6–45.3)
MCH RBC QN AUTO: 30.4 PG (ref 26.6–33)
MCHC RBC AUTO-ENTMCNC: 32.5 G/DL (ref 31.5–35.7)
MCV RBC AUTO: 93.5 FL (ref 79–97)
MONOCYTES # BLD AUTO: 0.41 10*3/MM3 (ref 0.1–0.9)
MONOCYTES NFR BLD AUTO: 7.3 % (ref 5–12)
NEUTROPHILS NFR BLD AUTO: 2.41 10*3/MM3 (ref 1.7–7)
NEUTROPHILS NFR BLD AUTO: 43.1 % (ref 42.7–76)
NRBC BLD AUTO-RTO: 0 /100 WBC (ref 0–0.2)
PLATELET # BLD AUTO: 297 10*3/MM3 (ref 140–450)
PMV BLD AUTO: 9.5 FL (ref 6–12)
POTASSIUM SERPL-SCNC: 3.8 MMOL/L (ref 3.5–5.2)
PROT SERPL-MCNC: 5.4 G/DL (ref 6–8.5)
RBC # BLD AUTO: 3.39 10*6/MM3 (ref 3.77–5.28)
SODIUM SERPL-SCNC: 138 MMOL/L (ref 136–145)
WBC NRBC COR # BLD: 5.59 10*3/MM3 (ref 3.4–10.8)

## 2023-11-16 PROCEDURE — 25010000002 HEPARIN (PORCINE) PER 1000 UNITS: Performed by: SURGERY

## 2023-11-16 PROCEDURE — 25010000002 PIPERACILLIN SOD-TAZOBACTAM PER 1 G: Performed by: SURGERY

## 2023-11-16 PROCEDURE — 85025 COMPLETE CBC W/AUTO DIFF WBC: CPT | Performed by: SURGERY

## 2023-11-16 PROCEDURE — 80053 COMPREHEN METABOLIC PANEL: CPT | Performed by: SURGERY

## 2023-11-16 PROCEDURE — 25010000002 KETOROLAC TROMETHAMINE PER 15 MG: Performed by: SURGERY

## 2023-11-16 RX ORDER — ONDANSETRON 4 MG/1
4 TABLET, FILM COATED ORAL EVERY 6 HOURS PRN
Qty: 12 TABLET | Refills: 0 | Status: SHIPPED | OUTPATIENT
Start: 2023-11-16 | End: 2023-12-20

## 2023-11-16 RX ORDER — PANTOPRAZOLE SODIUM 40 MG/1
40 TABLET, DELAYED RELEASE ORAL
Qty: 30 TABLET | Refills: 0 | Status: SHIPPED | OUTPATIENT
Start: 2023-11-17 | End: 2023-12-18 | Stop reason: SDUPTHER

## 2023-11-16 RX ADMIN — HEPARIN SODIUM 5000 UNITS: 5000 INJECTION INTRAVENOUS; SUBCUTANEOUS at 05:35

## 2023-11-16 RX ADMIN — POLYETHYLENE GLYCOL 3350 17 G: 17 POWDER, FOR SOLUTION ORAL at 09:10

## 2023-11-16 RX ADMIN — OXYCODONE HYDROCHLORIDE AND ACETAMINOPHEN 2 TABLET: 5; 325 TABLET ORAL at 13:31

## 2023-11-16 RX ADMIN — HEPARIN SODIUM 5000 UNITS: 5000 INJECTION INTRAVENOUS; SUBCUTANEOUS at 15:01

## 2023-11-16 RX ADMIN — KETOROLAC TROMETHAMINE 15 MG: 30 INJECTION, SOLUTION INTRAMUSCULAR; INTRAVENOUS at 10:19

## 2023-11-16 RX ADMIN — SODIUM CHLORIDE 10 ML: 9 INJECTION, SOLUTION INTRAMUSCULAR; INTRAVENOUS; SUBCUTANEOUS at 09:10

## 2023-11-16 RX ADMIN — DOCUSATE SODIUM 100 MG: 100 CAPSULE, LIQUID FILLED ORAL at 09:09

## 2023-11-16 RX ADMIN — PIPERACILLIN SODIUM AND TAZOBACTAM SODIUM 3.38 G: 3; .375 INJECTION, SOLUTION INTRAVENOUS at 05:35

## 2023-11-16 RX ADMIN — AMOXICILLIN AND CLAVULANATE POTASSIUM 1 TABLET: 875; 125 TABLET, FILM COATED ORAL at 09:10

## 2023-11-16 RX ADMIN — PANTOPRAZOLE SODIUM 40 MG: 40 TABLET, DELAYED RELEASE ORAL at 05:35

## 2023-11-16 NOTE — PLAN OF CARE
Goal Outcome Evaluation:                        Problem: Adult Inpatient Plan of Care  Goal: Plan of Care Review  Outcome: Ongoing, Progressing  Flowsheets (Taken 11/14/2023 0810 by Tanika Chang, RN)  Plan of Care Reviewed With: patient  Goal: Patient-Specific Goal (Individualized)  Outcome: Ongoing, Progressing  Goal: Absence of Hospital-Acquired Illness or Injury  Outcome: Ongoing, Progressing  Intervention: Identify and Manage Fall Risk  Recent Flowsheet Documentation  Taken 11/15/2023 2000 by Zaynab Russ RN  Safety Promotion/Fall Prevention:   activity supervised   assistive device/personal items within reach  Intervention: Prevent Skin Injury  Recent Flowsheet Documentation  Taken 11/15/2023 2000 by Zaynab Russ RN  Body Position: position changed independently  Skin Protection:   tubing/devices free from skin contact   transparent dressing maintained   skin-to-skin areas padded   skin-to-device areas padded  Intervention: Prevent and Manage VTE (Venous Thromboembolism) Risk  Recent Flowsheet Documentation  Taken 11/15/2023 2000 by Zaynab Russ RN  Activity Management: ambulated in room  Goal: Optimal Comfort and Wellbeing  Outcome: Ongoing, Progressing  Intervention: Provide Person-Centered Care  Recent Flowsheet Documentation  Taken 11/15/2023 2000 by Zaynab Russ RN  Trust Relationship/Rapport:   care explained   choices provided   emotional support provided   empathic listening provided  Goal: Readiness for Transition of Care  Outcome: Ongoing, Progressing  Intervention: Mutually Develop Transition Plan  Recent Flowsheet Documentation  Taken 11/16/2023 0013 by Zaynab Russ, RN  Equipment Currently Used at Home: none  Taken 11/16/2023 0003 by Zaynab Russ RN  Transportation Anticipated: family or friend will provide  Patient/Family Anticipated Services at Transition: none  Patient/Family Anticipates Transition to:   home   home with family     Problem: Adjustment to  Illness (Sepsis/Septic Shock)  Goal: Optimal Coping  Outcome: Ongoing, Progressing  Intervention: Optimize Psychosocial Adjustment to Illness  Recent Flowsheet Documentation  Taken 11/15/2023 2000 by Zaynab Russ, RN  Family/Support System Care:   support provided   self-care encouraged   involvement promoted     Problem: Pain Acute  Goal: Acceptable Pain Control and Functional Ability  Outcome: Ongoing, Progressing  Intervention: Prevent or Manage Pain  Recent Flowsheet Documentation  Taken 11/15/2023 2000 by Zaynab Russ, RN  Sensory Stimulation Regulation:   lighting decreased   care clustered  Intervention: Optimize Psychosocial Wellbeing  Recent Flowsheet Documentation  Taken 11/15/2023 2000 by Zaynab Russ, RN  Diversional Activities:   smartphone   television     Problem: Infection Progression (Sepsis/Septic Shock)  Goal: Absence of Infection Signs and Symptoms  Outcome: Ongoing, Progressing  Intervention: Promote Recovery  Recent Flowsheet Documentation  Taken 11/15/2023 2000 by Zaynab Russ, RN  Activity Management: ambulated in room

## 2023-11-16 NOTE — DISCHARGE INSTRUCTIONS
Follow up with Dr. Hackett in 2 weeks’ time    Follow-up with GI in 6 weeks for repeat ERCP and stent removal    Refrain from lifting more than 10 pounds    If you have worsening problems with fever, chills, nausea or vomiting, contact Dr. Hackett's office    It is okay to shower.  Let warm soapy water run over the incisions. Pat dry do not scrub. No tub baths for the next 2 weeks

## 2023-11-16 NOTE — PROGRESS NOTES
"Patient Name:  Martina Hopkins  YOB: 1990  [id number]    Surgery Progress Note    Date of visit: 11/16/2023      Subjective: No acute events.  Feeling better daily.  Reports only some very mild epigastric pain.  Had several small liquid bowel movements yesterday.  Already small amounts of regular diet.          Objective:     /62 (BP Location: Left arm, Patient Position: Lying)   Pulse 76   Temp 97.6 °F (36.4 °C) (Oral)   Resp 18   Ht 157.5 cm (62\")   Wt 82.1 kg (181 lb)   LMP 10/11/2023   SpO2 96%   BMI 33.11 kg/m²     Intake/Output Summary (Last 24 hours) at 11/16/2023 0935  Last data filed at 11/16/2023 0901  Gross per 24 hour   Intake 840 ml   Output --   Net 840 ml       GEN:   Awake, alert, in no acute distress, resting comfortably in bed   CV:   Regular rate and rhythm  L:  Symmetric expansion, not labored on room air  Abd:  Soft, only very mild tenderness to deep palpation in the epigastrium, no rebound or guarding, incisions are clean dry intact  Ext:  No cyanosis, clubbing, or edema    Recent labs that are back at this time have been reviewed.           Assessment/ Plan:    Mrs. Hopkins is a 33-year-old lady with history of PCOS who is postoperative day 3 following laparoscopic cholecystectomy who presents to the emergency department with recurrent abdominal pain.         #Bile leak status postcholecystectomy  -Postoperative day 7 following lap jeromy  -Postop day 2 following ERCP and stent placement  -Labs without significant change  -Clinically feeling better.  Having bowel function  -Continue regular diet  -Transition to oral antibiotics  -Hopefully home later today if she continues to tolerate a diet        Malick Hackett MD  11/16/2023  09:35 EST      "

## 2023-11-16 NOTE — DISCHARGE SUMMARY
Discharge Summary    Patient Name:  Martina Hopkins  YOB: 1990  [id number]      DATE OF ADMISSION: 11/12/2023    DATE OF DISCHARGE: 11/16/2023       FINAL DIAGNOSES:     Postoperative abdominal pain    Bile leak       CONSULTING SERVICES: Gastroenterology     PROCEDURES PERFORMED:   1.  ERCP on November 14, 2023    HISTORY: The patient is a very pleasant 33 y.o. female with a history of recent laparoscopic cholecystectomy on November 9, 2023 who was admitted on November 12 due to increasing abdominal pain.      HOSPITAL COURSE: Martina Hopkins was admitted to the hospital on November 12.  MRCP was completed on the night of admission and this was without any obvious pathology.  A HIDA scan was completed and this was concerning for a bile leak.  Gastroenterology was consulted and she underwent ERCP with stent placement on November 14, 2023.  She recovered well following this procedure.  A repeat CT scan was performed that night to assess for any fluid collection or biloma, and this did not demonstrate any significant fluid collection that would require drainage or repeat procedure.  She felt better following her ERCP procedure and improved.  She was able to tolerate a diet.  She had bowel function.  She was transitioned to oral antibiotics and tolerated this without difficulty. On 11/16/2023 they were deemed appropriate for discharge in stable condition after having achieved maximal benefit of their hospital stay.      CONDITION: At the time of discharge, the patient is tolerating a regular diet without difficulty. she is having normal bowel and bladder function. her incisions are clean, dry and intact. Pain is well controlled.       DISCHARGE MEDICATIONS:   1. All previous home medications.   2. Augmentin 875 MG BID for 7 days   3. Colace 100mg PO BID  4. Ondansetron 4 mg Q6h PRN nausea  5. Pantoprazole 40 MG QD      DISCHARGE INSTRUCTIONS:  1. The patient is instructed to follow up with Dr. Hackett in  2 weeks’ time.  2. she is instructed to refrain from lifting more than 10 pounds   3. If the patient has worsening problems with fever, chills, nausea or vomiting she is to contact Dr. Hackett's office and a contact card has been provided.  4. They have been instructed that it is okay to shower.  Let warm soapy water run over the incisions.  Pat dry do not scrub.  No tub baths for the next 2 weeks.  5. Follow-up with GI in 6 weeks for repeat ERCP and stent removal      Malick Hackett MD  11/16/2023  16:11 EST

## 2023-11-17 ENCOUNTER — TRANSITIONAL CARE MANAGEMENT TELEPHONE ENCOUNTER (OUTPATIENT)
Dept: CALL CENTER | Facility: HOSPITAL | Age: 33
End: 2023-11-17
Payer: MEDICAID

## 2023-11-17 DIAGNOSIS — R10.13 EPIGASTRIC PAIN: ICD-10-CM

## 2023-11-17 RX ORDER — FAMOTIDINE 20 MG/1
TABLET, FILM COATED ORAL
Qty: 90 TABLET | Refills: 1 | OUTPATIENT
Start: 2023-11-17

## 2023-11-17 NOTE — OUTREACH NOTE
Call Center TCM Note      Flowsheet Row Responses   [facility] patient discharged from? [place]   Does the patient have one of the following disease processes/diagnoses(primary or secondary)? Other   TCM attempt successful? Yes   Call start time 0906   Call end time 0908   Discharge diagnosis Postoperative abdominal pain, lap jeromy on 11/9/23, bile leak, ERCP with stent placement   Meds reviewed with patient/caregiver? Yes   Is the patient having any side effects they believe may be caused by any medication additions or changes? No   Does the patient have all medications ordered at discharge? Yes   Is the patient taking all medications as directed (includes completed medication regime)? Yes   Comments scheduled hospital f/u appt with PCP for 12/1   Does the patient have an appointment with their PCP within 7-14 days of discharge? Yes   Has home health visited the patient within 72 hours of discharge? N/A   Psychosocial issues? No   Did the patient receive a copy of their discharge instructions? Yes   Nursing interventions Reviewed instructions with patient   What is the patient's perception of their health status since discharge? Improving   Is the patient/caregiver able to teach back signs and symptoms related to disease process for when to call PCP? Yes   Is the patient/caregiver able to teach back signs and symptoms related to disease process for when to call 911? Yes   Is the patient/caregiver able to teach back the hierarchy of who to call/visit for symptoms/problems? PCP, Specialist, Home health nurse, Urgent Care, ED, 911 Yes   TCM call completed? Yes   Wrap up additional comments Doing well, no qeustions, scheduled hospital f/u appt with PCP for 12/1.   Call end time 0908   Would this patient benefit from a Referral to Amb Social Work? No   Is the patient interested in additional calls from an ambulatory ? No            Melina Longo RN    11/17/2023, 09:08 EST

## 2023-11-17 NOTE — OUTREACH NOTE
Prep Survey      Flowsheet Row Responses   [facility] facility patient discharged from? [place]   Is LACE score < 7 ? No   Eligibility [facility]   Date of Admission 11/12/23   Date of Discharge 11/16/23   Discharge Disposition Home or Self Care   Discharge diagnosis Postoperative abdominal pain, lap jeromy on 11/9/23, bile leak, ERCP with stent placement   Does the patient have one of the following disease processes/diagnoses(primary or secondary)? Other   Does the patient have Home health ordered? No   Is there a DME ordered? No   Prep survey completed? Yes            Licha Monahan Registered Nurse

## 2023-11-17 NOTE — PAYOR COMM NOTE
"Martina Quintero (33 y.o. Female)     [id number]     Lara Dahl, RN  Utilization Review  [phone]  [phone]        Date of Birth   1990    Social Security Number       Address   [address] [place] [address]  [place] [address]    Home Phone   [phone]    MRN   [id number]       Zoroastrian   None    Marital Status                               Admission Date   11/12/23    Admission Type   Emergency    Admitting Provider   Malick Hackett MD    Attending Provider       Department, Room/Bed   [address], S574/1       Discharge Date   11/16/2023    Discharge Disposition   Home or Self Care    Discharge Destination                                 Attending Provider: (none)   Allergies: Gildess 1.5-30 [Norethindrone-eth Estradiol], Lexapro [Escitalopram Oxalate]    Isolation: None   Infection: None   Code Status: Prior    Ht: 157.5 cm (62\")   Wt: 82.1 kg (181 lb)    Admission Cmt: None   Principal Problem: Postoperative abdominal pain [R10.9,G89.18]                   Active Insurance as of 11/12/2023       Primary Coverage       Payor Plan Insurance Group Employer/Plan Group    [employer]HE[employer] MEDICAID [place] MEDICAID [id number]       Payor Plan Address Payor Plan Phone Number Payor Plan Fax Number Effective Dates    [address] [address] [phone]  1/1/2017 - None Entered    [facility] [id number]         Subscriber Name Subscriber Birth Date Member ID       MARTINA QUINTERO 1990 [id number]                     Emergency Contacts        (Rel.) Home Phone Work Phone Mobile Phone    Caleb Quintero (Mother) [phone] -- --    GHASSAN CORBETT (Spouse) [phone] -- --                 Discharge Summary        Malick Hackett MD at 11/16/23 1611          Discharge Summary    Patient Name:  Martina Quintero  YOB: 1990  [id number]      DATE OF ADMISSION: 11/12/2023    DATE OF DISCHARGE: 11/16/2023       FINAL DIAGNOSES:     Postoperative abdominal " pain    Bile leak       CONSULTING SERVICES: Gastroenterology     PROCEDURES PERFORMED:   1.  ERCP on November 14, 2023    HISTORY: The patient is a very pleasant 33 y.o. female with a history of recent laparoscopic cholecystectomy on November 9, 2023 who was admitted on November 12 due to increasing abdominal pain.      HOSPITAL COURSE: Martina Hopkins was admitted to the hospital on November 12.  MRCP was completed on the night of admission and this was without any obvious pathology.  A HIDA scan was completed and this was concerning for a bile leak.  Gastroenterology was consulted and she underwent ERCP with stent placement on November 14, 2023.  She recovered well following this procedure.  A repeat CT scan was performed that night to assess for any fluid collection or biloma, and this did not demonstrate any significant fluid collection that would require drainage or repeat procedure.  She felt better following her ERCP procedure and improved.  She was able to tolerate a diet.  She had bowel function.  She was transitioned to oral antibiotics and tolerated this without difficulty. On 11/16/2023 they were deemed appropriate for discharge in stable condition after having achieved maximal benefit of their hospital stay.      CONDITION: At the time of discharge, the patient is tolerating a regular diet without difficulty. she is having normal bowel and bladder function. her incisions are clean, dry and intact. Pain is well controlled.       DISCHARGE MEDICATIONS:   1. All previous home medications.   2. Augmentin 875 MG BID for 7 days   3. Colace 100mg PO BID  4. Ondansetron 4 mg Q6h PRN nausea  5. Pantoprazole 40 MG QD      DISCHARGE INSTRUCTIONS:  1. The patient is instructed to follow up with Dr. Hackett in 2 weeks’ time.  2. she is instructed to refrain from lifting more than 10 pounds   3. If the patient has worsening problems with fever, chills, nausea or vomiting she is to contact Dr. Hackett's office and a  contact card has been provided.  4. They have been instructed that it is okay to shower.  Let warm soapy water run over the incisions.  Pat dry do not scrub.  No tub baths for the next 2 weeks.  5. Follow-up with GI in 6 weeks for repeat ERCP and stent removal      Malick Hackett MD  11/16/2023  16:11 EST             Electronically signed by Malick Hackett MD at 11/16/23 8192

## 2023-11-28 ENCOUNTER — LAB (OUTPATIENT)
Dept: LAB | Facility: HOSPITAL | Age: 33
End: 2023-11-28
Payer: MEDICAID

## 2023-11-28 ENCOUNTER — TRANSCRIBE ORDERS (OUTPATIENT)
Dept: LAB | Facility: HOSPITAL | Age: 33
End: 2023-11-28
Payer: MEDICAID

## 2023-11-28 DIAGNOSIS — K80.80 BILIARY CALCULUS OF OTHER SITE WITHOUT OBSTRUCTION: Primary | ICD-10-CM

## 2023-11-28 LAB
ALBUMIN SERPL-MCNC: 5.1 G/DL (ref 3.5–5.2)
ALBUMIN/GLOB SERPL: 2 G/DL
ALP SERPL-CCNC: 70 U/L (ref 39–117)
ALT SERPL W P-5'-P-CCNC: 13 U/L (ref 1–33)
ANION GAP SERPL CALCULATED.3IONS-SCNC: 10 MMOL/L (ref 5–15)
AST SERPL-CCNC: 8 U/L (ref 1–32)
BASOPHILS # BLD AUTO: 0.03 10*3/MM3 (ref 0–0.2)
BASOPHILS NFR BLD AUTO: 0.4 % (ref 0–1.5)
BILIRUB SERPL-MCNC: 0.5 MG/DL (ref 0–1.2)
BUN SERPL-MCNC: 8 MG/DL (ref 6–20)
BUN/CREAT SERPL: 9.6 (ref 7–25)
CALCIUM SPEC-SCNC: 10.5 MG/DL (ref 8.6–10.5)
CHLORIDE SERPL-SCNC: 101 MMOL/L (ref 98–107)
CO2 SERPL-SCNC: 27 MMOL/L (ref 22–29)
CREAT SERPL-MCNC: 0.83 MG/DL (ref 0.57–1)
DEPRECATED RDW RBC AUTO: 39.5 FL (ref 37–54)
EGFRCR SERPLBLD CKD-EPI 2021: 96.8 ML/MIN/1.73
EOSINOPHIL # BLD AUTO: 0.22 10*3/MM3 (ref 0–0.4)
EOSINOPHIL NFR BLD AUTO: 3.1 % (ref 0.3–6.2)
ERYTHROCYTE [DISTWIDTH] IN BLOOD BY AUTOMATED COUNT: 12.3 % (ref 12.3–15.4)
GLOBULIN UR ELPH-MCNC: 2.5 GM/DL
GLUCOSE SERPL-MCNC: 96 MG/DL (ref 65–99)
HCT VFR BLD AUTO: 42.4 % (ref 34–46.6)
HGB BLD-MCNC: 14 G/DL (ref 12–15.9)
IMM GRANULOCYTES # BLD AUTO: 0.03 10*3/MM3 (ref 0–0.05)
IMM GRANULOCYTES NFR BLD AUTO: 0.4 % (ref 0–0.5)
LIPASE SERPL-CCNC: 74 U/L (ref 13–60)
LYMPHOCYTES # BLD AUTO: 1.75 10*3/MM3 (ref 0.7–3.1)
LYMPHOCYTES NFR BLD AUTO: 24.5 % (ref 19.6–45.3)
MCH RBC QN AUTO: 28.9 PG (ref 26.6–33)
MCHC RBC AUTO-ENTMCNC: 33 G/DL (ref 31.5–35.7)
MCV RBC AUTO: 87.6 FL (ref 79–97)
MONOCYTES # BLD AUTO: 0.52 10*3/MM3 (ref 0.1–0.9)
MONOCYTES NFR BLD AUTO: 7.3 % (ref 5–12)
NEUTROPHILS NFR BLD AUTO: 4.58 10*3/MM3 (ref 1.7–7)
NEUTROPHILS NFR BLD AUTO: 64.3 % (ref 42.7–76)
NRBC BLD AUTO-RTO: 0 /100 WBC (ref 0–0.2)
PLATELET # BLD AUTO: 383 10*3/MM3 (ref 140–450)
PMV BLD AUTO: 10 FL (ref 6–12)
POTASSIUM SERPL-SCNC: 4.1 MMOL/L (ref 3.5–5.2)
PROT SERPL-MCNC: 7.6 G/DL (ref 6–8.5)
RBC # BLD AUTO: 4.84 10*6/MM3 (ref 3.77–5.28)
SODIUM SERPL-SCNC: 138 MMOL/L (ref 136–145)
WBC NRBC COR # BLD AUTO: 7.13 10*3/MM3 (ref 3.4–10.8)

## 2023-11-28 PROCEDURE — 85025 COMPLETE CBC W/AUTO DIFF WBC: CPT

## 2023-11-28 PROCEDURE — 80053 COMPREHEN METABOLIC PANEL: CPT

## 2023-11-28 PROCEDURE — 36415 COLL VENOUS BLD VENIPUNCTURE: CPT

## 2023-11-28 PROCEDURE — 83690 ASSAY OF LIPASE: CPT

## 2023-12-01 ENCOUNTER — OFFICE VISIT (OUTPATIENT)
Dept: INTERNAL MEDICINE | Facility: CLINIC | Age: 33
End: 2023-12-01
Payer: MEDICAID

## 2023-12-01 VITALS
DIASTOLIC BLOOD PRESSURE: 72 MMHG | BODY MASS INDEX: 31.71 KG/M2 | WEIGHT: 173.38 LBS | SYSTOLIC BLOOD PRESSURE: 118 MMHG | TEMPERATURE: 97.8 F | RESPIRATION RATE: 16 BRPM | HEART RATE: 68 BPM

## 2023-12-01 DIAGNOSIS — G89.18 POSTOPERATIVE ABDOMINAL PAIN: Primary | ICD-10-CM

## 2023-12-01 DIAGNOSIS — J30.2 SEASONAL ALLERGIC RHINITIS, UNSPECIFIED TRIGGER: ICD-10-CM

## 2023-12-01 DIAGNOSIS — R10.9 POSTOPERATIVE ABDOMINAL PAIN: Primary | ICD-10-CM

## 2023-12-01 DIAGNOSIS — K83.9 BILE LEAK: ICD-10-CM

## 2023-12-01 DIAGNOSIS — Z23 NEED FOR IMMUNIZATION AGAINST INFLUENZA: ICD-10-CM

## 2023-12-01 LAB — LIPASE SERPL-CCNC: 79 U/L (ref 13–60)

## 2023-12-01 PROCEDURE — 83690 ASSAY OF LIPASE: CPT | Performed by: INTERNAL MEDICINE

## 2023-12-01 RX ORDER — FAMOTIDINE 40 MG/1
40 TABLET, FILM COATED ORAL NIGHTLY
Qty: 30 TABLET | Refills: 2 | Status: SHIPPED | OUTPATIENT
Start: 2023-12-01

## 2023-12-01 RX ORDER — FLUTICASONE PROPIONATE 50 MCG
2 SPRAY, SUSPENSION (ML) NASAL DAILY
Qty: 9.9 ML | Refills: 0 | Status: SHIPPED | OUTPATIENT
Start: 2023-12-01 | End: 2023-12-08

## 2023-12-01 NOTE — PROGRESS NOTES
Transitional Care Follow Up Visit  Subjective     Martina Hopkins is a 33 y.o. female who presents for a transitional care management visit.    Within 48 business hours after discharge our office contacted her via telephone to coordinate her care and needs.      I reviewed and discussed the details of that call along with the discharge summary, hospital problems, inpatient lab results, inpatient diagnostic studies, and consultation reports with Martina.     Current outpatient and discharge medications have been reconciled for the patient.    Reviewed by: Courtney Hardwick MA  Chief Complaint   Patient presents with    Transitional Care Management            11/16/2023     9:15 PM   Date of TCM Phone Call   [facility]   Date of Admission 11/12/2023   Date of Discharge 11/16/2023   Discharge Disposition Home or Self Care     Risk for Readmission (LACE) Score: 8 (11/16/2023  6:00 AM)      History of Present Illness     Course During Hospital Stay: The patient is here for a hospital follow-up.  She was admitted to [facility] on 11/12/2023 and discharged on 11/16/2023.  Records have been received and reviewed.  Admitting diagnosis was Postoperative Abdominal Pain, and she was found to have a bile leak, as a complication from her laparoscopic cholecystectomy on 11/9/2023 performed per Dr. Hackett (pathology: chronic cholecystitis and cholelithiasis, benign cystic duct lymph node).  She had been to the ED earlier in the day with the abdominal pain.  Labs were significant for an elevated white blood cell count (14.99).  CMP was normal with exception of an elevated non-fasting glucose (139).  Lipase was normal (18).  CT abdomen/pelvis with contrast showed fluid in the surgical bed which was nonspecific.  After discussion with Dr. Hackett, the patient was discharged home since her pain was well-controlled.  She returned later that day and was admitted.  MRI abdomen without contrast/MRCP was negative with the exception  of mild perihepatic and perisplenic fluid and mild fluid in the gallbladder fossa.  A HIDA scan was completed on 11/13/2023 and this was concerning for a bile leak.  Gastroenterology was consulted and she underwent ERCP with stent placement on November 14, 2023.  She recovered well following this procedure.  A repeat CT scan was performed that night to assess for any fluid collection or biloma, and this did not demonstrate any significant fluid collection that would require drainage or repeat procedure.  She felt better following her ERCP procedure and improved.  She was able to tolerate a diet.  She had bowel function.  She was transitioned to oral antibiotics (Augmentin) and tolerated this without difficulty. On 11/16/2023,  she was deemed appropriate for discharge in stable condition after having achieved maximal benefit of her hospital stay.      Labs: On admission (second ED visit), CBC was significant for an elevated white blood cell count (14.84).  CMP was significant for an elevated nonfasting glucose (163), and elevated AST (88), and elevated ALT (83), and a low lipase (12).  Urinalysis was negative with exception of ketones.  Urine pregnancy test was negative.  White blood cell count trended down throughout the hospitalization to discharge level of 5.59 on 11/16/2023.  Liver enzymes trended down throughout the hospitalization to a mildly elevated ALT (44) and a normal AST (14).  Lipase level was not rechecked.    Imaging: On 11/12/2023, CT abdomen/pelvis with contrast showed: Moderate fluid in the cholecystectomy bed without defined collection. This finding is non-specific given only post-operative day three and fluid may simply be post-operative. Consider HIDA scan correlation to exclude biliary leak. Dense colonic fecal retention without obstruction. Mild fluid distention of scattered small bowel loops without transition, likely indicative of mild postoperative ileus.  On 11/12/2023, MRI abdomen without  contrast MRCP showed: 1.Status post cholecystectomy. No biliary dilation or intraductal filling defects identified to suggest choledocholithiasis. Please correlate with serum bilirubin levels.  2.There is mild perihepatic and perisplenic fluid. Mild fluid in the gallbladder fossa.  On 11/13/2023, HIDA scan showed:1. Abnormal activity seen along the inferior margin of the right lobe of the liver extending towards right paracolic gutter suspicious for bile leak. Additionally there is an increasing rounded amount of activity in the region of the gallbladder fossa which could potentially be related to biloma.  On 11/14/2023, CT abdomen pelvis showed: There has been interval placement of a biliary stent. There is no biliary duct dilatation. There has been decrease free fluid adjacent to the inferior tip of the liver and in the pelvis, possibly related to known bile leak. There is a new 1.5 cm focal high density fluid collection in the gallbladder fossa, which may be a small biloma although the density is greater then typical bile density, and a small hematoma is also considered.    Since discharge, the patient continues to have epigastric abdominal pain radiating to the mid back.  Weight is decreased 8 pounds since 7/21/2023.  She feels the pain is improving, now 4/10.  She is off Narcotics and NSAIDs.  She felt this caused stomach upset.  There is no nausea or vomiting, but she has had some diarrhea.  She denies heartburn, acid reflux, constipation, and melena.  She does have chronic intermittent blood in her stool due to a fissure.  Last Colonoscopy on 3/10/2014 was normal with the exception of rectal irritation.  She continues to take Pantoprazole daily and Colace twice daily.  In addition, she has been taking 6-7 Tums daily for the epigastric pain, with some relief.  She is drinking minimal caffeine and has stopped drinking alcohol.     The patient was seen for follow-up by her surgeon, , on 11/20/2023.  CMP  and CBC were normal.  Lipase was elevated (74).    Of note, the patient is also complaining of a headache for the past 4 days.  She states she feels like she is dehydrated.  She feels thirsty but has trouble drinking water.  She denies other allergy and sinus symptoms.  She is using saline spray and humidifier with some relief.     The following portions of the patient's history were reviewed and updated as appropriate: allergies, current medications, past family history, past medical history, past social history, past surgical history, and problem list.    Review of Systems   Constitutional:  Positive for unexpected weight change. Negative for chills and fever.   Respiratory:  Negative for cough and shortness of breath.    Cardiovascular:  Negative for chest pain.   Gastrointestinal:  Positive for abdominal pain, blood in stool and diarrhea. Negative for constipation, nausea and vomiting.   Genitourinary:  Negative for dysuria, frequency, hematuria and urgency.       Objective   Physical Exam  Vitals and nursing note reviewed.   Constitutional:       Appearance: She is well-developed.      Comments: BMI greater than 30.   Cardiovascular:      Rate and Rhythm: Normal rate and regular rhythm.      Heart sounds: Normal heart sounds. No murmur heard.  Pulmonary:      Effort: Pulmonary effort is normal.      Breath sounds: Normal breath sounds.   Abdominal:      General: Bowel sounds are normal. There is no distension.      Palpations: Abdomen is soft. There is no hepatomegaly, splenomegaly or mass.      Tenderness: There is no abdominal tenderness. There is no right CVA tenderness or left CVA tenderness.   Skin:     Findings: Bruising (abdominal) present. No rash.   Neurological:      Mental Status: She is alert.   Psychiatric:         Mood and Affect: Mood normal.         Assessment & Plan   Diagnoses and all orders for this visit:    1. Postoperative abdominal pain (Primary)  -     famotidine (Pepcid) 40 MG tablet;  Take 1 tablet by mouth Every Night.  Dispense: 30 tablet; Refill: 2- NEW   Continue Pantoprazole.   May continue Tums, but hopefully will be able to stop.   Follow-up per General surgery.    2. Bile leak  -     Lipase    3. Seasonal allergic rhinitis, unspecified trigger  -     fluticasone (FLONASE) 50 MCG/ACT nasal spray; 2 sprays into the nostril(s) as directed by provider Daily for 7 days.  Dispense: 9.9 mL; Refill: 0- REFILL   May continue saline spray and humidifier.    4. Need for immunization against influenza  -     Fluzone (or Fluarix & Flulaval for VFC) >6mos      Transitional Care Management Certification  I certify that the following are true:  Communication was made within 2 business days of discharge.  Complexity of Medical Decision Making is moderate.  Face to face visit occurred within 15 days.    *Note: 38353 is for high complexity patients with a face to face visit within 7 days of discharge.  98226 is for high complexity patients with a face to face on days 8-14 post discharge or medium complexity with face to face visit within 14 days post discharge.        Return in about 6 months (around 6/1/2024) for Annual physical, fasting.

## 2023-12-05 ENCOUNTER — TELEPHONE (OUTPATIENT)
Dept: INTERNAL MEDICINE | Facility: CLINIC | Age: 33
End: 2023-12-05
Payer: MEDICAID

## 2023-12-05 NOTE — TELEPHONE ENCOUNTER
Caller: Martina Hopkins    Relationship: Self    Best call back number: [phone]     Caller requesting test results: LAB RESULTS    What test was performed: LIPASE LABS    When was the test performed: 11/02/23    Where was the test performed: [facility]    Additional notes: PATIENT WAS CALLING FOR THE RESULTS FOR HER LABS.     PLEASE CALL  PATIENT TO DISCUSS THESE CONCERNS ASAP

## 2023-12-05 NOTE — TELEPHONE ENCOUNTER
Her lipase level is still elevated, which probably indicates some resolving inflammation of the pancreas.  Since her symptoms are improving, I would not worry about it at this time.  I see she has a follow-up appointment with Dr. Rodriguez (GI) on 12/29/2023.  He should be able to repeat the lipase level at that time.

## 2023-12-18 RX ORDER — PANTOPRAZOLE SODIUM 40 MG/1
40 TABLET, DELAYED RELEASE ORAL
Qty: 30 TABLET | Refills: 0 | Status: SHIPPED | OUTPATIENT
Start: 2023-12-18

## 2023-12-18 RX ORDER — DOCUSATE SODIUM 100 MG/1
100 CAPSULE, LIQUID FILLED ORAL 2 TIMES DAILY
Qty: 30 CAPSULE | Refills: 0 | Status: SHIPPED | OUTPATIENT
Start: 2023-12-18

## 2023-12-19 ENCOUNTER — TELEPHONE (OUTPATIENT)
Dept: GASTROENTEROLOGY | Facility: CLINIC | Age: 33
End: 2023-12-19
Payer: MEDICAID

## 2023-12-19 NOTE — TELEPHONE ENCOUNTER
Pt called and stated that she would like to have her Lipase checked before her procedure.   Her Primary is out of town.   Ok to put in order?

## 2023-12-20 ENCOUNTER — APPOINTMENT (OUTPATIENT)
Dept: CT IMAGING | Facility: HOSPITAL | Age: 33
End: 2023-12-20
Payer: MEDICAID

## 2023-12-20 ENCOUNTER — HOSPITAL ENCOUNTER (EMERGENCY)
Facility: HOSPITAL | Age: 33
Discharge: HOME | End: 2023-12-20
Attending: EMERGENCY MEDICINE | Admitting: EMERGENCY MEDICINE
Payer: MEDICAID

## 2023-12-20 VITALS
RESPIRATION RATE: 14 BRPM | OXYGEN SATURATION: 98 % | BODY MASS INDEX: 32.2 KG/M2 | DIASTOLIC BLOOD PRESSURE: 72 MMHG | HEART RATE: 80 BPM | TEMPERATURE: 98.4 F | SYSTOLIC BLOOD PRESSURE: 95 MMHG | HEIGHT: 62 IN | WEIGHT: 175 LBS

## 2023-12-20 DIAGNOSIS — K83.9 BILE LEAK: Primary | ICD-10-CM

## 2023-12-20 DIAGNOSIS — Z98.890 RECENT MAJOR SURGERY: ICD-10-CM

## 2023-12-20 DIAGNOSIS — R10.13 EPIGASTRIC PAIN: Primary | ICD-10-CM

## 2023-12-20 LAB
ALBUMIN SERPL-MCNC: 4.7 G/DL (ref 3.5–5.2)
ALBUMIN/GLOB SERPL: 1.6 G/DL
ALP SERPL-CCNC: 58 U/L (ref 39–117)
ALT SERPL W P-5'-P-CCNC: 16 U/L (ref 1–33)
ANION GAP SERPL CALCULATED.3IONS-SCNC: 11 MMOL/L (ref 5–15)
AST SERPL-CCNC: 12 U/L (ref 1–32)
B-HCG UR QL: NEGATIVE
BASOPHILS # BLD AUTO: 0.02 10*3/MM3 (ref 0–0.2)
BASOPHILS NFR BLD AUTO: 0.3 % (ref 0–1.5)
BILIRUB SERPL-MCNC: 0.4 MG/DL (ref 0–1.2)
BILIRUB UR QL STRIP: NEGATIVE
BUN SERPL-MCNC: 8 MG/DL (ref 6–20)
BUN/CREAT SERPL: 11.4 (ref 7–25)
CALCIUM SPEC-SCNC: 10.2 MG/DL (ref 8.6–10.5)
CHLORIDE SERPL-SCNC: 103 MMOL/L (ref 98–107)
CLARITY UR: CLEAR
CO2 SERPL-SCNC: 24 MMOL/L (ref 22–29)
COLOR UR: YELLOW
CREAT SERPL-MCNC: 0.7 MG/DL (ref 0.57–1)
D-LACTATE SERPL-SCNC: 1.2 MMOL/L (ref 0.5–2)
DEPRECATED RDW RBC AUTO: 42.9 FL (ref 37–54)
EGFRCR SERPLBLD CKD-EPI 2021: 118.7 ML/MIN/1.73
EOSINOPHIL # BLD AUTO: 0.05 10*3/MM3 (ref 0–0.4)
EOSINOPHIL NFR BLD AUTO: 0.7 % (ref 0.3–6.2)
ERYTHROCYTE [DISTWIDTH] IN BLOOD BY AUTOMATED COUNT: 12.7 % (ref 12.3–15.4)
EXPIRATION DATE: NORMAL
GLOBULIN UR ELPH-MCNC: 3 GM/DL
GLUCOSE SERPL-MCNC: 94 MG/DL (ref 65–99)
GLUCOSE UR STRIP-MCNC: NEGATIVE MG/DL
HCT VFR BLD AUTO: 40.9 % (ref 34–46.6)
HGB BLD-MCNC: 13.7 G/DL (ref 12–15.9)
HGB UR QL STRIP.AUTO: NEGATIVE
HOLD SPECIMEN: NORMAL
IMM GRANULOCYTES # BLD AUTO: 0.02 10*3/MM3 (ref 0–0.05)
IMM GRANULOCYTES NFR BLD AUTO: 0.3 % (ref 0–0.5)
INTERNAL NEGATIVE CONTROL: NEGATIVE
INTERNAL POSITIVE CONTROL: POSITIVE
KETONES UR QL STRIP: ABNORMAL
LEUKOCYTE ESTERASE UR QL STRIP.AUTO: NEGATIVE
LIPASE SERPL-CCNC: 43 U/L (ref 13–60)
LYMPHOCYTES # BLD AUTO: 2.1 10*3/MM3 (ref 0.7–3.1)
LYMPHOCYTES NFR BLD AUTO: 29.5 % (ref 19.6–45.3)
Lab: NORMAL
MCH RBC QN AUTO: 30.8 PG (ref 26.6–33)
MCHC RBC AUTO-ENTMCNC: 33.5 G/DL (ref 31.5–35.7)
MCV RBC AUTO: 91.9 FL (ref 79–97)
MONOCYTES # BLD AUTO: 0.48 10*3/MM3 (ref 0.1–0.9)
MONOCYTES NFR BLD AUTO: 6.7 % (ref 5–12)
NEUTROPHILS NFR BLD AUTO: 4.45 10*3/MM3 (ref 1.7–7)
NEUTROPHILS NFR BLD AUTO: 62.5 % (ref 42.7–76)
NITRITE UR QL STRIP: NEGATIVE
NRBC BLD AUTO-RTO: 0 /100 WBC (ref 0–0.2)
PH UR STRIP.AUTO: 6 [PH] (ref 5–8)
PLATELET # BLD AUTO: 293 10*3/MM3 (ref 140–450)
PMV BLD AUTO: 9.7 FL (ref 6–12)
POTASSIUM SERPL-SCNC: 4.1 MMOL/L (ref 3.5–5.2)
PROT SERPL-MCNC: 7.7 G/DL (ref 6–8.5)
PROT UR QL STRIP: NEGATIVE
RBC # BLD AUTO: 4.45 10*6/MM3 (ref 3.77–5.28)
SODIUM SERPL-SCNC: 138 MMOL/L (ref 136–145)
SP GR UR STRIP: 1.02 (ref 1–1.03)
UROBILINOGEN UR QL STRIP: ABNORMAL
WBC NRBC COR # BLD AUTO: 7.12 10*3/MM3 (ref 3.4–10.8)
WHOLE BLOOD HOLD COAG: NORMAL
WHOLE BLOOD HOLD SPECIMEN: NORMAL

## 2023-12-20 PROCEDURE — 83690 ASSAY OF LIPASE: CPT | Performed by: EMERGENCY MEDICINE

## 2023-12-20 PROCEDURE — 83605 ASSAY OF LACTIC ACID: CPT | Performed by: EMERGENCY MEDICINE

## 2023-12-20 PROCEDURE — 99285 EMERGENCY DEPT VISIT HI MDM: CPT

## 2023-12-20 PROCEDURE — 96374 THER/PROPH/DIAG INJ IV PUSH: CPT

## 2023-12-20 PROCEDURE — 85025 COMPLETE CBC W/AUTO DIFF WBC: CPT | Performed by: EMERGENCY MEDICINE

## 2023-12-20 PROCEDURE — 80053 COMPREHEN METABOLIC PANEL: CPT | Performed by: EMERGENCY MEDICINE

## 2023-12-20 PROCEDURE — 81025 URINE PREGNANCY TEST: CPT | Performed by: EMERGENCY MEDICINE

## 2023-12-20 PROCEDURE — 96375 TX/PRO/DX INJ NEW DRUG ADDON: CPT

## 2023-12-20 PROCEDURE — 25810000003 SODIUM CHLORIDE 0.9 % SOLUTION: Performed by: EMERGENCY MEDICINE

## 2023-12-20 PROCEDURE — 74177 CT ABD & PELVIS W/CONTRAST: CPT

## 2023-12-20 PROCEDURE — 25010000002 ONDANSETRON PER 1 MG: Performed by: EMERGENCY MEDICINE

## 2023-12-20 PROCEDURE — 93005 ELECTROCARDIOGRAM TRACING: CPT | Performed by: EMERGENCY MEDICINE

## 2023-12-20 PROCEDURE — 25510000001 IOPAMIDOL 61 % SOLUTION: Performed by: EMERGENCY MEDICINE

## 2023-12-20 PROCEDURE — 25010000002 HYDROMORPHONE PER 4 MG: Performed by: EMERGENCY MEDICINE

## 2023-12-20 PROCEDURE — 81003 URINALYSIS AUTO W/O SCOPE: CPT | Performed by: EMERGENCY MEDICINE

## 2023-12-20 RX ORDER — ONDANSETRON 4 MG/1
4 TABLET, FILM COATED ORAL EVERY 6 HOURS PRN
Qty: 8 TABLET | Refills: 0 | Status: SHIPPED | OUTPATIENT
Start: 2023-12-20

## 2023-12-20 RX ORDER — FAMOTIDINE 10 MG/ML
20 INJECTION, SOLUTION INTRAVENOUS ONCE
Status: COMPLETED | OUTPATIENT
Start: 2023-12-20 | End: 2023-12-20

## 2023-12-20 RX ORDER — SUCRALFATE 1 G/1
1 TABLET ORAL 4 TIMES DAILY
Qty: 20 TABLET | Refills: 0 | Status: SHIPPED | OUTPATIENT
Start: 2023-12-20

## 2023-12-20 RX ORDER — ONDANSETRON 2 MG/ML
4 INJECTION INTRAMUSCULAR; INTRAVENOUS ONCE
Status: COMPLETED | OUTPATIENT
Start: 2023-12-20 | End: 2023-12-20

## 2023-12-20 RX ORDER — PANTOPRAZOLE SODIUM 40 MG/1
40 TABLET, DELAYED RELEASE ORAL ONCE
Status: COMPLETED | OUTPATIENT
Start: 2023-12-20 | End: 2023-12-20

## 2023-12-20 RX ORDER — HYDROMORPHONE HYDROCHLORIDE 1 MG/ML
0.5 INJECTION, SOLUTION INTRAMUSCULAR; INTRAVENOUS; SUBCUTANEOUS
Status: DISCONTINUED | OUTPATIENT
Start: 2023-12-20 | End: 2023-12-20 | Stop reason: HOSPADM

## 2023-12-20 RX ORDER — SODIUM CHLORIDE 0.9 % (FLUSH) 0.9 %
10 SYRINGE (ML) INJECTION AS NEEDED
Status: DISCONTINUED | OUTPATIENT
Start: 2023-12-20 | End: 2023-12-20 | Stop reason: HOSPADM

## 2023-12-20 RX ORDER — SUCRALFATE 1 G/1
1 TABLET ORAL ONCE
Status: COMPLETED | OUTPATIENT
Start: 2023-12-20 | End: 2023-12-20

## 2023-12-20 RX ADMIN — PANTOPRAZOLE SODIUM 40 MG: 40 TABLET, DELAYED RELEASE ORAL at 17:50

## 2023-12-20 RX ADMIN — HYDROMORPHONE HYDROCHLORIDE 0.5 MG: 1 INJECTION, SOLUTION INTRAMUSCULAR; INTRAVENOUS; SUBCUTANEOUS at 17:50

## 2023-12-20 RX ADMIN — ONDANSETRON 4 MG: 2 INJECTION INTRAMUSCULAR; INTRAVENOUS at 16:40

## 2023-12-20 RX ADMIN — IOPAMIDOL 85 ML: 612 INJECTION, SOLUTION INTRAVENOUS at 17:25

## 2023-12-20 RX ADMIN — FAMOTIDINE 20 MG: 10 INJECTION, SOLUTION INTRAVENOUS at 17:50

## 2023-12-20 RX ADMIN — SUCRALFATE 1 G: 1 TABLET ORAL at 17:50

## 2023-12-20 RX ADMIN — SODIUM CHLORIDE 1000 ML: 9 INJECTION, SOLUTION INTRAVENOUS at 17:51

## 2023-12-20 NOTE — ED PROVIDER NOTES
Subjective   History of Present Illness  Patient is a pleasant 33-year-old female who presents to the emergency department with epigastric pain.  She states that on November 9 of this year she had a cholecystectomy performed by Dr. Hackett, general surgery.  Procedure initially went well but on November 12 he was noted to have a bile leak.  She had stents placed has been doing relatively well.  The stent is scheduled to be removed on the 29th of this month, in approximately 9 days.  She states over the past 2 days she developed a sharp epigastric abdominal pain.  Describes it as a sharp epigastric pain radiating to her back.  She has had some mild nausea and has been trying to be gentle on her stomach including mainly liquids yesterday and an Ensure drink today.  Unfortunately has not resolved the pain.  She also took some oxycodone and Toradol at home which did slightly improve the pain but was not adequate.  Denies fevers, chills, chest pain, shortness of breath, vomiting, diarrhea, or other acute complaints.      Review of Systems   All other systems reviewed and are negative.      Past Medical History:   Diagnosis Date    Constipation     Resolved    Generalized anxiety disorder     History of abnormal cervical Pap smear 07/2013    repeat normal per patient    History of colonoscopy 03/10/2014    normal except rectal irritation (bx neg)    History of dog bite 09/2014    left wrist    History of varicella     PCOS (polycystic ovarian syndrome)     Dx 2014.    PCOS (polycystic ovarian syndrome)     S/P cholecystectomy     Vitamin D deficiency     Dx 4/17       Allergies   Allergen Reactions    Gildess 1.5-30 [Norethindrone-Eth Estradiol] Other (See Comments)     Excessive bleeding    Lexapro [Escitalopram Oxalate] Other (See Comments)     Body tingling and burning feeling  Vomiting excessive crying        Past Surgical History:   Procedure Laterality Date    ERCP N/A 11/14/2023    Procedure: ENDOSCOPIC RETROGRADE  CHOLANGIOPANCREATOGRAPHY WITH STENT PLACEMENT;  Surgeon: Jose Luis Woods MD;  Location: [facility] ENDOSCOPY;  Service: Gastroenterology;  Laterality: N/A;  spinterectomy done, balloon sweep with 9-12 mm balloon, 10F/7cm bilary stent implanted.    LAPAROSCOPIC CHOLECYSTECTOMY  11/09/2023       Family History   Problem Relation Age of Onset    Hypothyroidism Sister         due to thyroidectomy    Thyroid cancer Sister     Diabetes Maternal Uncle     Diabetes Maternal Grandmother     Coronary artery disease Maternal Grandfather     Colon cancer Paternal Grandmother     Diabetes Paternal Grandfather        Social History     Socioeconomic History    Marital status:     Number of children: 0   Tobacco Use    Smoking status: Never    Smokeless tobacco: Never   Vaping Use    Vaping Use: Never used   Substance and Sexual Activity    Alcohol use: Yes     Comment: 1 cocktail 3-4 times per week    Drug use: Not Currently     Comment: experimented with multiple drugs age [age]    Sexual activity: Yes     Partners: Female     Birth control/protection: None           Objective   Physical Exam  Vitals and nursing note reviewed.   Constitutional:       General: She is not in acute distress.  HENT:      Head: Normocephalic and atraumatic.   Eyes:      Conjunctiva/sclera: Conjunctivae normal.      Pupils: Pupils are equal, round, and reactive to light.   Neck:      Thyroid: No thyromegaly.   Cardiovascular:      Rate and Rhythm: Normal rate and regular rhythm.      Heart sounds: Normal heart sounds. No murmur heard.     No friction rub. No gallop.   Pulmonary:      Effort: Pulmonary effort is normal. No respiratory distress.      Breath sounds: Normal breath sounds.   Abdominal:      General: Bowel sounds are normal.      Palpations: Abdomen is soft.      Tenderness: There is abdominal tenderness (Mild tenderness to palpation epigastric) in the epigastric area.   Genitourinary:     Rectum: Normal.   Musculoskeletal:          General: Normal range of motion.      Cervical back: Normal range of motion and neck supple.   Lymphadenopathy:      Cervical: No cervical adenopathy.   Skin:     General: Skin is warm and dry.      Capillary Refill: Capillary refill takes less than 2 seconds.   Neurological:      General: No focal deficit present.      Mental Status: She is alert and oriented to person, place, and time.   Psychiatric:         Mood and Affect: Mood normal.         Behavior: Behavior normal.         Procedures           ED Course  ED Course as of 12/23/23 0726   Wed Dec 20, 2023   1856 Patient did have slight improvement with the antacids and Carafate.  She did not feel like she had any improvement with the Dilaudid.  I will prescribe her the antacids and Carafate to take at home.  She can also supplement this with Tums as needed.  Fortunately, she already has an upper endoscopy as scheduled with the doctor who wears later this month, on the 29th.  We will treat possible gastritis/ulcer source at this time.  Patient understands to monitor symptoms closely and have a low threshold to return to the emergency department if symptoms worsen or other concerns arise. [CP]      ED Course User Index  [CP] Jovanni Vazquez DO       Latest Reference Range & Units 12/20/23 16:35 12/20/23 16:43 12/20/23 16:44   Sodium 136 - 145 mmol/L 138     Potassium 3.5 - 5.2 mmol/L 4.1     Chloride 98 - 107 mmol/L 103     CO2 22.0 - 29.0 mmol/L 24.0     Anion Gap 5.0 - 15.0 mmol/L 11.0     BUN 6 - 20 mg/dL 8     Creatinine 0.57 - 1.00 mg/dL 0.70     BUN/Creatinine Ratio 7.0 - 25.0  11.4     eGFR >60.0 mL/min/1.73 118.7     Glucose 65 - 99 mg/dL 94     Calcium 8.6 - 10.5 mg/dL 10.2     Alkaline Phosphatase 39 - 117 U/L 58     Total Protein 6.0 - 8.5 g/dL 7.7     Albumin 3.5 - 5.2 g/dL 4.7     Globulin gm/dL 3.0     A/G Ratio g/dL 1.6     AST (SGOT) 1 - 32 U/L 12     ALT (SGPT) 1 - 33 U/L 16     Total Bilirubin 0.0 - 1.2 mg/dL 0.4     Lactate 0.5 - 2.0 mmol/L 1.2      Lipase 13 - 60 U/L 43     WBC 3.40 - 10.80 10*3/mm3 7.12     RBC 3.77 - 5.28 10*6/mm3 4.45     Hemoglobin 12.0 - 15.9 g/dL 13.7     Hematocrit 34.0 - 46.6 % 40.9     Platelets 140 - 450 10*3/mm3 293     RDW 12.3 - 15.4 % 12.7     MCV 79.0 - 97.0 fL 91.9     MCH 26.6 - 33.0 pg 30.8     MCHC 31.5 - 35.7 g/dL 33.5     MPV 6.0 - 12.0 fL 9.7     RDW-SD 37.0 - 54.0 fl 42.9     Neutrophil Rel % 42.7 - 76.0 % 62.5     Lymphocyte Rel % 19.6 - 45.3 % 29.5     Monocyte Rel % 5.0 - 12.0 % 6.7     Eosinophil Rel % 0.3 - 6.2 % 0.7     Basophil Rel % 0.0 - 1.5 % 0.3     Immature Granulocyte Rel % 0.0 - 0.5 % 0.3     Neutrophils Absolute 1.70 - 7.00 10*3/mm3 4.45     Lymphocytes Absolute 0.70 - 3.10 10*3/mm3 2.10     Monocytes Absolute 0.10 - 0.90 10*3/mm3 0.48     Eosinophils Absolute 0.00 - 0.40 10*3/mm3 0.05     Basophils Absolute 0.00 - 0.20 10*3/mm3 0.02     Immature Grans, Absolute 0.00 - 0.05 10*3/mm3 0.02     nRBC 0.0 - 0.2 /100 WBC 0.0     Color, UA Yellow, Straw   Yellow    Appearance, UA Clear   Clear    Specific Gravity, UA 1.001 - 1.030   1.019    pH, UA 5.0 - 8.0   6.0    Glucose Negative   Negative    Ketones, UA Negative   15 mg/dL (1+) !    Blood, UA Negative   Negative    Nitrite, UA Negative   Negative    Leukocytes, UA Negative   Negative    Protein, UA Negative   Negative    Bilirubin, UA Negative   Negative    Urobilinogen, UA 0.2 - 1.0 E.U./dL   0.2 E.U./dL    HCG, Urine QL Negative    Negative   !: Data is abnormal                                   CT Abdomen Pelvis With Contrast   Final Result   Impression:      1. Previously noted small fluid collection in the gallbladder fossa has decreased in density and appears stable or decreased in size from 11/14/2023.      2. Interval resolution of right upper quadrant fat stranding and trace free fluid. No new upper abdominal disease is seen.      4. Biliary stent remains in place. No evidence of biliary ductal dilatation.      5. New, 2.2 cm simple appearing  "right ovarian cyst.            Electronically Signed: Parish Hyde MD     12/20/2023 5:56 PM EST     Workstation ID: [id number]        Vitals:    12/20/23 1611 12/20/23 1911   BP: 120/74 95/72   BP Location: Left arm    Patient Position: Sitting    Pulse: 77 80   Resp: 14    Temp: 98.4 °F (36.9 °C)    TempSrc: Oral    SpO2: 100% 98%   Weight: 79.4 kg (175 lb)    Height: 157.5 cm (62\")      Medications   ondansetron (ZOFRAN) injection 4 mg (4 mg Intravenous Given 12/20/23 1640)   sodium chloride 0.9 % bolus 1,000 mL (0 mL Intravenous Stopped 12/20/23 1900)   famotidine (PEPCID) injection 20 mg (20 mg Intravenous Given 12/20/23 1750)   pantoprazole (PROTONIX) EC tablet 40 mg (40 mg Oral Given 12/20/23 1750)   sucralfate (CARAFATE) tablet 1 g (1 g Oral Given 12/20/23 1750)   iopamidol (ISOVUE-300) 61 % injection 100 mL (85 mL Intravenous Given 12/20/23 1725)     ECG/EMG Results (last 24 hours)       ** No results found for the last 24 hours. **          ECG 12 Lead Other; epigastric pain   Preliminary Result   Test Reason : Other~   Blood Pressure :   */*   mmHG   Vent. Rate :  73 BPM     Atrial Rate :  73 BPM      P-R Int : 142 ms          QRS Dur :  92 ms       QT Int : 402 ms       P-R-T Axes :  23  30  21 degrees      QTc Int : 442 ms      Normal sinus rhythm   Normal ECG   No previous ECGs available      Referred By: STEPHANIE           Confirmed By:                       Medical Decision Making  Workup reassuring.  No acute pathology on imaging.  Pt feels moderately better following treatment.  She has reliable outpatient follow up and understands to have a low threshold to return to the ED if symptoms persist, worsen, or other concerns arise.    Problems Addressed:  Epigastric pain: complicated acute illness or injury  Recent major surgery: complicated acute illness or injury    Amount and/or Complexity of Data Reviewed  Independent Historian: EMS  External Data Reviewed: notes.  Labs: ordered. Decision-making details " documented in ED Course.  Radiology: ordered and independent interpretation performed. Decision-making details documented in ED Course.  ECG/medicine tests: ordered and independent interpretation performed. Decision-making details documented in ED Course.    Risk  Prescription drug management.        Final diagnoses:   Epigastric pain   Recent major surgery       ED Disposition  ED Disposition       ED Disposition   Discharge    Condition   Stable    Comment   --           DISCHARGE    Patient discharged in stable condition.    Reviewed implications of results, diagnosis, meds, responsibility to follow up, warning signs and symptoms of possible worsening, potential complications and reasons to return to ER.    Patient/Family voiced understanding of above instructions.    Discussed plan for discharge, as there is no emergent indication for admission.  Pt/family is agreeable and understands need for follow up and possible repeat testing.  Pt/family is aware that discharge does not mean that nothing is wrong but that it indicates no emergency is currently present that requires admission and they must continue care with follow-up as given below or with a physician of their choice.     FOLLOW-UP  Taylor Ventura MD  [address]  [facility] [id number] [phone]    Schedule an appointment as soon as possible for a visit       [facility] EMERGENCY DEPARTMENT  [address] [facility] [address] [phone]    If symptoms worsen    Malick Hackett MD  [address] [facility] [address]  [place] [id number]  [phone]    Schedule an appointment as soon as possible for a visit            Medication List        New Prescriptions      sucralfate 1 g tablet  Commonly known as: CARAFATE  Take 1 tablet by mouth 4 (Four) Times a Day.               Where to Get Your Medications        These medications were sent to [facility] [id number] [address] AT  [facility]. - [phone] [facility] [phone] FX  [address], [facility] [address]      Phone: [phone]   ondansetron 4 MG tablet  sucralfate 1 g tablet            Jovanni Vazquez DO  12/23/23 0727

## 2023-12-21 LAB
QT INTERVAL: 402 MS
QTC INTERVAL: 442 MS

## 2023-12-21 NOTE — TELEPHONE ENCOUNTER
Caller: Martina Hopkins    Relationship: Self    Best call back number: [phone]     Requested Prescriptions:   Requested Prescriptions     Pending Prescriptions Disp Refills    docusate sodium (Colace) 100 MG capsule 30 capsule 0     Sig: Take 1 capsule by mouth 2 (Two) Times a Day.    pantoprazole (PROTONIX) 40 MG EC tablet 30 tablet 0     Sig: Take 1 tablet by mouth Every Morning.        Pharmacy where request should be sent: [facility] [id number] [address] AT [facility] [phone] [facility] [phone]      Last office visit with prescribing clinician: 12/1/2023   Last telemedicine visit with prescribing clinician: 10/4/2023   Next office visit with prescribing clinician: 6/6/2024     Additional details provided by patient: PATIENT HAS CALLED REQUESTING REFILLS ON ABOVE MEDICATIONS    Does the patient have less than a 3 day supply:  [x] Yes  [] No    Would you like a call back once the refill request has been completed: [] Yes [x] No    If the office needs to give you a call back, can they leave a voicemail: [] Yes [x] No    Maddy Soto   12/18/23 10:01 EST         
[email or web address] message sent.   
PATIENT HAS CALLED REQUESTING IF LAB ORDERS CAN BE PUT IN BY PCP IN PREPARATION FOR AN UPCOMING PATIENT IS HAVING ON 12/29/23. PATIENT IS NEEDING A LAB ORDER FOR LIPASE.  PATIENT IS REQUESTING A CALL BACK ONCE ORDER HAS BEEN PLACED.    CALL BACK NUMBER [phone]   
Refills provided. Please notify patient that PCP is out of clinic, and appears her appointment is with Gastroenterology on 12/29/23, recommend contacting Dr. Rodriguez of GI's clinic for any orders they would want done.    Dr. Esparza  
Tried to reach patient no answer left voicemail to return call    RELAY:  Refills provided. Please notify patient that PCP is out of clinic, and appears her appointment is with Gastroenterology on 12/29/23, recommend contacting Dr. Rodriguez of GI's clinic for any orders they would want done.     Dr. Esparza  
Statement Selected

## 2023-12-29 ENCOUNTER — OUTSIDE FACILITY SERVICE (OUTPATIENT)
Dept: GASTROENTEROLOGY | Facility: CLINIC | Age: 33
End: 2023-12-29
Payer: MEDICAID

## 2023-12-29 LAB
QT INTERVAL: 402 MS
QTC INTERVAL: 442 MS

## 2023-12-29 PROCEDURE — 43247 EGD REMOVE FOREIGN BODY: CPT | Performed by: INTERNAL MEDICINE

## 2024-04-01 ENCOUNTER — TELEPHONE (OUTPATIENT)
Dept: INTERNAL MEDICINE | Facility: CLINIC | Age: 34
End: 2024-04-01
Payer: MEDICAID

## 2024-04-01 DIAGNOSIS — F41.1 GENERALIZED ANXIETY DISORDER: Primary | ICD-10-CM

## 2024-04-01 RX ORDER — HYDROXYZINE HYDROCHLORIDE 25 MG/1
TABLET, FILM COATED ORAL
Qty: 50 TABLET | Refills: 2 | Status: SHIPPED | OUTPATIENT
Start: 2024-04-01

## 2024-04-01 NOTE — TELEPHONE ENCOUNTER
Caller: Martina Hopkins    Relationship: Self    Best call back number: [phone]     Requested Prescriptions:     hydrOXYzine (ATARAX) 25 MG tablet [Pharmacy Med Name: hydrOXYzine HCL 25 MG TABLET] [[id number]      Pharmacy where request should be sent: [facility] [id number] [address] AT [facility] [phone] [facility] [phone]      Last office visit with prescribing clinician: 12/1/2023   Last telemedicine visit with prescribing clinician: 10/4/2023   Next office visit with prescribing clinician: 6/6/2024     Additional details provided by patient: PATIENT STATES SHE HAS BEEN WAITING FOR OVER 2 WEEKS TO HAVE THIS REFILL AND THE PHARMACY HAS NOT RECEIVED THIS PRESCRIPTION. PLEASE RESEND. PATIENT STATES DR BALLARD IS THE PRESCRIBING PROVIDER. STATES SHE HAS BEEN ON THIS MEDICATION FOR 3-4 YEARS    Does the patient have less than a 3 day supply:  [x] Yes  [] No HAS BEEN TAKING SPARINGLY- HAS BEEN OUT FOR A FEW MONTHS.    Would you like a call back once the refill request has been completed: [] Yes [x] No    If the office needs to give you a call back, can they leave a voicemail: [x] Yes [] No    Violette Peguero Rep   04/01/24 11:57 EDT

## 2024-06-06 ENCOUNTER — OFFICE VISIT (OUTPATIENT)
Dept: INTERNAL MEDICINE | Facility: CLINIC | Age: 34
End: 2024-06-06
Payer: MEDICAID

## 2024-06-06 VITALS
HEIGHT: 62 IN | HEART RATE: 78 BPM | BODY MASS INDEX: 32.57 KG/M2 | RESPIRATION RATE: 16 BRPM | WEIGHT: 177 LBS | SYSTOLIC BLOOD PRESSURE: 98 MMHG | TEMPERATURE: 98 F | DIASTOLIC BLOOD PRESSURE: 68 MMHG

## 2024-06-06 DIAGNOSIS — M25.50 PAIN IN JOINT, MULTIPLE SITES: ICD-10-CM

## 2024-06-06 DIAGNOSIS — Z13.6 SCREENING FOR CARDIOVASCULAR CONDITION: ICD-10-CM

## 2024-06-06 DIAGNOSIS — Z23 NEED FOR TDAP VACCINATION: ICD-10-CM

## 2024-06-06 DIAGNOSIS — F41.1 GENERALIZED ANXIETY DISORDER: ICD-10-CM

## 2024-06-06 DIAGNOSIS — B36.0 TINEA VERSICOLOR: ICD-10-CM

## 2024-06-06 DIAGNOSIS — E28.2 PCOS (POLYCYSTIC OVARIAN SYNDROME): ICD-10-CM

## 2024-06-06 DIAGNOSIS — E55.9 VITAMIN D DEFICIENCY: ICD-10-CM

## 2024-06-06 DIAGNOSIS — Z00.00 ENCOUNTER FOR HEALTH MAINTENANCE EXAMINATION IN ADULT: Primary | ICD-10-CM

## 2024-06-06 PROBLEM — G89.18 POSTOPERATIVE ABDOMINAL PAIN: Status: RESOLVED | Noted: 2023-11-12 | Resolved: 2024-06-06

## 2024-06-06 PROBLEM — R10.9 POSTOPERATIVE ABDOMINAL PAIN: Status: RESOLVED | Noted: 2023-11-12 | Resolved: 2024-06-06

## 2024-06-06 PROBLEM — K83.9 BILE LEAK: Status: RESOLVED | Noted: 2023-11-12 | Resolved: 2024-06-06

## 2024-06-06 LAB
25(OH)D3 SERPL-MCNC: 29.9 NG/ML (ref 30–100)
ALBUMIN SERPL-MCNC: 4.7 G/DL (ref 3.5–5.2)
ALBUMIN/GLOB SERPL: 1.9 G/DL
ALP SERPL-CCNC: 48 U/L (ref 39–117)
ALT SERPL W P-5'-P-CCNC: 18 U/L (ref 1–33)
ANION GAP SERPL CALCULATED.3IONS-SCNC: 9 MMOL/L (ref 5–15)
AST SERPL-CCNC: 10 U/L (ref 1–32)
BASOPHILS # BLD AUTO: 0.02 10*3/MM3 (ref 0–0.2)
BASOPHILS NFR BLD AUTO: 0.4 % (ref 0–1.5)
BILIRUB BLD-MCNC: NEGATIVE MG/DL
BILIRUB SERPL-MCNC: 0.5 MG/DL (ref 0–1.2)
BUN SERPL-MCNC: 10 MG/DL (ref 6–20)
BUN/CREAT SERPL: 14.3 (ref 7–25)
CALCIUM SPEC-SCNC: 9.5 MG/DL (ref 8.6–10.5)
CHLORIDE SERPL-SCNC: 104 MMOL/L (ref 98–107)
CHOLEST SERPL-MCNC: 211 MG/DL (ref 0–200)
CLARITY, POC: CLEAR
CO2 SERPL-SCNC: 26 MMOL/L (ref 22–29)
COLOR UR: YELLOW
CREAT SERPL-MCNC: 0.7 MG/DL (ref 0.57–1)
DEPRECATED RDW RBC AUTO: 39.1 FL (ref 37–54)
EGFRCR SERPLBLD CKD-EPI 2021: 118 ML/MIN/1.73
EOSINOPHIL # BLD AUTO: 0.05 10*3/MM3 (ref 0–0.4)
EOSINOPHIL NFR BLD AUTO: 1.1 % (ref 0.3–6.2)
ERYTHROCYTE [DISTWIDTH] IN BLOOD BY AUTOMATED COUNT: 12 % (ref 12.3–15.4)
EXPIRATION DATE: NORMAL
GLOBULIN UR ELPH-MCNC: 2.5 GM/DL
GLUCOSE SERPL-MCNC: 87 MG/DL (ref 65–99)
GLUCOSE UR STRIP-MCNC: NEGATIVE MG/DL
HCT VFR BLD AUTO: 41.4 % (ref 34–46.6)
HDLC SERPL-MCNC: 71 MG/DL (ref 40–60)
HGB BLD-MCNC: 14.3 G/DL (ref 12–15.9)
IMM GRANULOCYTES # BLD AUTO: 0.01 10*3/MM3 (ref 0–0.05)
IMM GRANULOCYTES NFR BLD AUTO: 0.2 % (ref 0–0.5)
KETONES UR QL: NEGATIVE
LDLC SERPL CALC-MCNC: 127 MG/DL (ref 0–100)
LDLC/HDLC SERPL: 1.77 {RATIO}
LEUKOCYTE EST, POC: NEGATIVE
LYMPHOCYTES # BLD AUTO: 1.66 10*3/MM3 (ref 0.7–3.1)
LYMPHOCYTES NFR BLD AUTO: 36.6 % (ref 19.6–45.3)
Lab: NORMAL
MCH RBC QN AUTO: 30.8 PG (ref 26.6–33)
MCHC RBC AUTO-ENTMCNC: 34.5 G/DL (ref 31.5–35.7)
MCV RBC AUTO: 89 FL (ref 79–97)
MONOCYTES # BLD AUTO: 0.31 10*3/MM3 (ref 0.1–0.9)
MONOCYTES NFR BLD AUTO: 6.8 % (ref 5–12)
NEUTROPHILS NFR BLD AUTO: 2.49 10*3/MM3 (ref 1.7–7)
NEUTROPHILS NFR BLD AUTO: 54.9 % (ref 42.7–76)
NITRITE UR-MCNC: NEGATIVE MG/ML
NRBC BLD AUTO-RTO: 0 /100 WBC (ref 0–0.2)
PH UR: 7 [PH] (ref 5–8)
PLATELET # BLD AUTO: 292 10*3/MM3 (ref 140–450)
PMV BLD AUTO: 9.7 FL (ref 6–12)
POTASSIUM SERPL-SCNC: 4 MMOL/L (ref 3.5–5.2)
PROT SERPL-MCNC: 7.2 G/DL (ref 6–8.5)
PROT UR STRIP-MCNC: NEGATIVE MG/DL
RBC # BLD AUTO: 4.65 10*6/MM3 (ref 3.77–5.28)
RBC # UR STRIP: NEGATIVE /UL
SODIUM SERPL-SCNC: 139 MMOL/L (ref 136–145)
SP GR UR: 1 (ref 1–1.03)
TRIGL SERPL-MCNC: 73 MG/DL (ref 0–150)
TSH SERPL DL<=0.05 MIU/L-ACNC: 1.14 UIU/ML (ref 0.27–4.2)
UROBILINOGEN UR QL: NORMAL
VLDLC SERPL-MCNC: 13 MG/DL (ref 5–40)
WBC NRBC COR # BLD AUTO: 4.54 10*3/MM3 (ref 3.4–10.8)

## 2024-06-06 PROCEDURE — 80050 GENERAL HEALTH PANEL: CPT | Performed by: INTERNAL MEDICINE

## 2024-06-06 PROCEDURE — 82306 VITAMIN D 25 HYDROXY: CPT | Performed by: INTERNAL MEDICINE

## 2024-06-06 PROCEDURE — 90471 IMMUNIZATION ADMIN: CPT | Performed by: INTERNAL MEDICINE

## 2024-06-06 PROCEDURE — 80061 LIPID PANEL: CPT | Performed by: INTERNAL MEDICINE

## 2024-06-06 PROCEDURE — 2014F MENTAL STATUS ASSESS: CPT | Performed by: INTERNAL MEDICINE

## 2024-06-06 PROCEDURE — 90715 TDAP VACCINE 7 YRS/> IM: CPT | Performed by: INTERNAL MEDICINE

## 2024-06-06 PROCEDURE — 99395 PREV VISIT EST AGE 18-39: CPT | Performed by: INTERNAL MEDICINE

## 2024-06-06 PROCEDURE — 1159F MED LIST DOCD IN RCRD: CPT | Performed by: INTERNAL MEDICINE

## 2024-06-06 PROCEDURE — 1126F AMNT PAIN NOTED NONE PRSNT: CPT | Performed by: INTERNAL MEDICINE

## 2024-06-06 PROCEDURE — 1160F RVW MEDS BY RX/DR IN RCRD: CPT | Performed by: INTERNAL MEDICINE

## 2024-06-06 RX ORDER — KETOCONAZOLE 20 MG/G
1 CREAM TOPICAL DAILY
Qty: 60 G | Refills: 0 | Status: SHIPPED | OUTPATIENT
Start: 2024-06-06 | End: 2024-06-20

## 2024-06-06 RX ORDER — MELOXICAM 15 MG/1
15 TABLET ORAL DAILY PRN
Qty: 30 TABLET | Refills: 2 | Status: SHIPPED | OUTPATIENT
Start: 2024-06-06

## 2024-06-06 RX ORDER — BUSPIRONE HYDROCHLORIDE 5 MG/1
5 TABLET ORAL 3 TIMES DAILY PRN
Qty: 50 TABLET | Refills: 2 | Status: SHIPPED | OUTPATIENT
Start: 2024-06-06

## 2024-06-06 NOTE — PATIENT INSTRUCTIONS
I recommend the COVID-19 bivalent vaccine at the pharmacy, Fall 2024, as we discussed.    Health Maintenance, Female  Adopting a healthy lifestyle and getting preventive care are important in promoting health and wellness. Ask your health care provider about:  The right schedule for you to have regular tests and exams.  Things you can do on your own to prevent diseases and keep yourself healthy.  What should I know about diet, weight, and exercise?  Eat a healthy diet    Eat a diet that includes plenty of vegetables, fruits, low-fat dairy products, and lean protein.  Do not eat a lot of foods that are high in solid fats, added sugars, or sodium.  Maintain a healthy weight  Body mass index (BMI) is used to identify weight problems. It estimates body fat based on height and weight. Your health care provider can help determine your BMI and help you achieve or maintain a healthy weight.  Get regular exercise  Get regular exercise. This is one of the most important things you can do for your health. Most adults should:  Exercise for at least 150 minutes each week. The exercise should increase your heart rate and make you sweat (moderate-intensity exercise).  Do strengthening exercises at least twice a week. This is in addition to the moderate-intensity exercise.  Spend less time sitting. Even light physical activity can be beneficial.  Watch cholesterol and blood lipids  Have your blood tested for lipids and cholesterol at 20 years of age, then have this test every 5 years.  Have your cholesterol levels checked more often if:  Your lipid or cholesterol levels are high.  You are older than 40 years of age.  You are at high risk for heart disease.  What should I know about cancer screening?  Depending on your health history and family history, you may need to have cancer screening at various ages. This may include screening for:  Breast cancer.  Cervical cancer.  Colorectal cancer.  Skin cancer.  Lung cancer.  What should  I know about heart disease, diabetes, and high blood pressure?  Blood pressure and heart disease  High blood pressure causes heart disease and increases the risk of stroke. This is more likely to develop in people who have high blood pressure readings or are overweight.  Have your blood pressure checked:  Every 3-5 years if you are 18-39 years of age.  Every year if you are 40 years old or older.  Diabetes  Have regular diabetes screenings. This checks your fasting blood sugar level. Have the screening done:  Once every three years after age 40 if you are at a normal weight and have a low risk for diabetes.  More often and at a younger age if you are overweight or have a high risk for diabetes.  What should I know about preventing infection?  Hepatitis B  If you have a higher risk for hepatitis B, you should be screened for this virus. Talk with your health care provider to find out if you are at risk for hepatitis B infection.  Hepatitis C  Testing is recommended for:  Everyone born from 1945 through 1965.  Anyone with known risk factors for hepatitis C.  Sexually transmitted infections (STIs)  Get screened for STIs, including gonorrhea and chlamydia, if:  You are sexually active and are younger than 24 years of age.  You are older than 24 years of age and your health care provider tells you that you are at risk for this type of infection.  Your sexual activity has changed since you were last screened, and you are at increased risk for chlamydia or gonorrhea. Ask your health care provider if you are at risk.  Ask your health care provider about whether you are at high risk for HIV. Your health care provider may recommend a prescription medicine to help prevent HIV infection. If you choose to take medicine to prevent HIV, you should first get tested for HIV. You should then be tested every 3 months for as long as you are taking the medicine.  Pregnancy  If you are about to stop having your period (premenopausal) and  you may become pregnant, seek counseling before you get pregnant.  Take 400 to 800 micrograms (mcg) of folic acid every day if you become pregnant.  Ask for birth control (contraception) if you want to prevent pregnancy.  Osteoporosis and menopause  Osteoporosis is a disease in which the bones lose minerals and strength with aging. This can result in bone fractures. If you are 65 years old or older, or if you are at risk for osteoporosis and fractures, ask your health care provider if you should:  Be screened for bone loss.  Take a calcium or vitamin D supplement to lower your risk of fractures.  Be given hormone replacement therapy (HRT) to treat symptoms of menopause.  Follow these instructions at home:  Alcohol use  Do not drink alcohol if:  Your health care provider tells you not to drink.  You are pregnant, may be pregnant, or are planning to become pregnant.  If you drink alcohol:  Limit how much you have to:  0-1 drink a day.  Know how much alcohol is in your drink. In the [place] one drink equals one 12 oz bottle of beer (355 mL), one 5 oz glass of wine (148 mL), or one 1½ oz glass of hard liquor (44 mL).  Lifestyle  Do not use any products that contain nicotine or tobacco. These products include cigarettes, chewing tobacco, and vaping devices, such as e-cigarettes. If you need help quitting, ask your health care provider.  Do not use street drugs.  Do not share needles.  Ask your health care provider for help if you need support or information about quitting drugs.  General instructions  Schedule regular health, dental, and eye exams.  Stay current with your vaccines.  Tell your health care provider if:  You often feel depressed.  You have ever been abused or do not feel safe at home.  Summary  Adopting a healthy lifestyle and getting preventive care are important in promoting health and wellness.  Follow your health care provider's instructions about healthy diet, exercising, and getting tested or screened  for diseases.  Follow your health care provider's instructions on monitoring your cholesterol and blood pressure.  This information is not intended to replace advice given to you by your health care provider. Make sure you discuss any questions you have with your health care provider.  Document Revised: 05/09/2022 Document Reviewed: 05/09/2022  [employer] Patient Education © 2024 [employer]      MyPlate from [employer]    MyPlate is an outline of a general healthy diet based on the Dietary Guidelines for Americans, 1993-6458, from the U[place]. Department of Agriculture (USDA). It sets guidelines for how much food you should eat from each food group based on your age, sex, and level of physical activity.  What are tips for following MyPlate?  To follow MyPlate recommendations:  Eat a wide variety of fruits and vegetables, grains, and protein foods.  Serve smaller portions and eat less food throughout the day.  Limit portion sizes to avoid overeating.  Enjoy your food.  Get at least 150 minutes of exercise every week. This is about 30 minutes each day, 5 or more days per week.  It can be difficult to have every meal look like MyPlate. Think about MyPlate as eating guidelines for an entire day, rather than each individual meal.  Fruits and vegetables  Make one half of your plate fruits and vegetables.  Eat many different colors of fruits and vegetables each day.  For a 2,000-calorie daily food plan, eat:  2½ cups of vegetables every day.  2 cups of fruit every day.  1 cup is equal to:  1 cup raw or cooked vegetables.  1 cup raw fruit.  1 medium-sized orange, apple, or banana.  1 cup 100% fruit or vegetable juice.  2 cups raw leafy greens, such as lettuce, spinach, or kale.  ½ cup dried fruit.  Grains  One fourth of your plate should be grains.  Make at least half of the grains you eat each day whole grains.  For a 2,000-calorie daily food plan, eat 6 oz of grains every day.  1 oz is equal to:  1 slice bread.  1 cup cereal.  ½  cup cooked rice, cereal, or pasta.  Protein  One fourth of your plate should be protein.  Eat a wide variety of protein foods, including meat, poultry, fish, eggs, beans, nuts, and tofu.  For a 2,000-calorie daily food plan, eat 5½ oz of protein every day.  1 oz is equal to:  1 oz meat, poultry, or fish.  ¼ cup cooked beans.  1 egg.  ½ oz nuts or seeds.  1 Tbsp peanut butter.  Dairy  Drink fat-free or low-fat (1%) milk.  Eat or drink dairy as a side to meals.  For a 2,000-calorie daily food plan, eat or drink 3 cups of dairy every day.  1 cup is equal to:  1 cup milk, yogurt, cottage cheese, or soy milk (soy beverage).  2 oz processed cheese.  1½ oz natural cheese.  Fats, oils, salt, and sugars  Only small amounts of oils are recommended.  Avoid foods that are high in calories and low in nutritional value (empty calories), like foods high in fat or added sugars.  Choose foods that are low in salt (sodium). Choose foods that have less than 140 milligrams (mg) of sodium per serving.  Drink water instead of sugary drinks. Drink enough fluid to keep your urine pale yellow.  Where to find support  Work with your health care provider or a dietitian to develop a customized eating plan that is right for you.  Download an jacinta (mobile application) to help you track your daily food intake.  Where to find more information  USDA: ChooseMyPlate.gov  Summary  MyPlate is a general guideline for healthy eating from the USDA. It is based on the Dietary Guidelines for Americans, 8560-3206.  In general, fruits and vegetables should take up one half of your plate, grains should take up one fourth of your plate, and protein should take up one fourth of your plate.  This information is not intended to replace advice given to you by your health care provider. Make sure you discuss any questions you have with your health care provider.  Document Revised: 11/08/2021 Document Reviewed: 11/08/2021  [employer] Patient Education © 2024 [employer]      Exercising to Stay Healthy  To become healthy and stay healthy, it is recommended that you do moderate-intensity and vigorous-intensity exercise. You can tell that you are exercising at a moderate intensity if your heart starts beating faster and you start breathing faster but can still hold a conversation. You can tell that you are exercising at a vigorous intensity if you are breathing much harder and faster and cannot hold a conversation while exercising.  How can exercise benefit me?  Exercising regularly is important. It has many health benefits, such as:  Improving overall fitness, flexibility, and endurance.  Increasing bone density.  Helping with weight control.  Decreasing body fat.  Increasing muscle strength and endurance.  Reducing stress and tension, anxiety, depression, or anger.  Improving overall health.  What guidelines should I follow while exercising?  Before you start a new exercise program, talk with your health care provider.  Do not exercise so much that you hurt yourself, feel dizzy, or get very short of breath.  Wear comfortable clothes and wear shoes with good support.  Drink plenty of water while you exercise to prevent dehydration or heat stroke.  Work out until your breathing and your heartbeat get faster (moderate intensity).  How often should I exercise?  Choose an activity that you enjoy, and set realistic goals. Your health care provider can help you make an activity plan that is individually designed and works best for you.  Exercise regularly as told by your health care provider. This may include:  Doing strength training two times a week, such as:  Lifting weights.  Using resistance bands.  Push-ups.  Sit-ups.  Yoga.  Doing a certain intensity of exercise for a given amount of time. Choose from these options:  A total of 150 minutes of moderate-intensity exercise every week.  A total of 75 minutes of vigorous-intensity exercise every week.  A mix of moderate-intensity  and vigorous-intensity exercise every week.  Children, pregnant women, people who have not exercised regularly, people who are overweight, and older adults may need to talk with a health care provider about what activities are safe to perform. If you have a medical condition, be sure to talk with your health care provider before you start a new exercise program.  What are some exercise ideas?  Moderate-intensity exercise ideas include:  Walking 1 mile (1.6 km) in about 15 minutes.  Biking.  Hiking.  Golfing.  Dancing.  Water aerobics.  Vigorous-intensity exercise ideas include:  Walking 4.5 miles (7.2 km) or more in about 1 hour.  Jogging or running 5 miles (8 km) in about 1 hour.  Biking 10 miles (16.1 km) or more in about 1 hour.  Lap swimming.  Roller-skating or in-line skating.  Cross-country skiing.  Vigorous competitive sports, such as football, basketball, and soccer.  Jumping rope.  Aerobic dancing.  What are some everyday activities that can help me get exercise?  Yard work, such as:  Pushing a .  Raking and bagging leaves.  Washing your car.  Pushing a stroller.  Shoveling snow.  Gardening.  Washing windows or floors.  How can I be more active in my day-to-day activities?  Use stairs instead of an elevator.  Take a walk during your lunch break.  If you drive, park your car farther away from your work or school.  If you take public transportation, get off one stop early and walk the rest of the way.  Stand up or walk around during all of your indoor phone calls.  Get up, stretch, and walk around every 30 minutes throughout the day.  Enjoy exercise with a friend. Support to continue exercising will help you keep a regular routine of activity.  Where to find more information  You can find more information about exercising to stay healthy from:  [place] Department of Health and Human Services: www.hhs.gov  Centers for Disease Control and Prevention (CDC): www.cdc.gov  Summary  Exercising regularly is  important. It will improve your overall fitness, flexibility, and endurance.  Regular exercise will also improve your overall health. It can help you control your weight, reduce stress, and improve your bone density.  Do not exercise so much that you hurt yourself, feel dizzy, or get very short of breath.  Before you start a new exercise program, talk with your health care provider.  This information is not intended to replace advice given to you by your health care provider. Make sure you discuss any questions you have with your health care provider.  Document Revised: 04/15/2022 Document Reviewed: 04/15/2022  [employer] Patient Education © 2024 [employer]      Breast Self-Awareness  Breast self-awareness is knowing how your breasts look and feel. You need to:  Check your breasts on a regular basis.  Tell your doctor about any changes.  Become familiar with the look and feel of your breasts. This can help you catch a breast problem while it is still small and can be treated. You should do breast self-exams even if you have breast implants.  What you need:  A mirror.  A well-lit room.  A pillow or other soft object.  How to do a breast self-exam  Follow these steps to do a breast self-exam:  Look for changes    Take off all the clothes above your waist.   front of a mirror in a room with good lighting.  Put your hands down at your sides.  Compare your breasts in the mirror. Look for any difference between them, such as:  A difference in shape.  A difference in size.  Wrinkles, dips, and bumps in one breast and not the other.  Look at each breast for changes in the skin, such as:  Redness.  Scaly areas.  Skin that has gotten thicker.  Dimpling.  Open sores (ulcers).  Look for changes in your nipples, such as:  Fluid coming out of a nipple.  Fluid around a nipple.  Bleeding.  Dimpling.  Redness.  A nipple that looks pushed in (retracted), or that has changed position.  Feel for changes  Lie on your  back.  Feel each breast. To do this:  Pick a breast to feel.  Place a pillow under the shoulder closest to that breast. Put the arm closest to that breast behind your head.  Feel the nipple area of that breast using the hand of your other arm. Feel the area with the pads of your three middle fingers by making small circles with your fingers. Use light, medium, and firm pressure.  Continue the overlapping circles, moving downward over the breast. Keep making circles with your fingers. Stop when you feel your ribs.  Start making circles with your fingers again, this time going upward until you reach your collarbone.  Then, make circles outward across your breast and into your armpit area.  Squeeze your nipple. Check for discharge and lumps.  Repeat these steps to check your other breast.  Sit or  the tub or shower.  With soapy water on your skin, feel each breast the same way you did when you were lying down.  Write down what you find  Writing down what you find can help you remember what to tell your doctor. Write down:  What is normal for each breast.  Any changes you find in each breast. These include:  The kind of changes you find.  A tender or painful breast.  Any lump you find. Write down its size and where it is.  When you last had your monthly period (menstrual cycle).  General tips  If you are breastfeeding, the best time to check your breasts is after you feed your baby or after you use a breast pump.  If you get monthly bleeding, the best time to check your breasts is 5-7 days after your monthly cycle ends.  With time, you will become comfortable with the self-exam. You will also start to know if there are changes in your breasts.  Contact a doctor if:  You see a change in the shape or size of your breasts or nipples.  You see a change in the skin of your breast or nipples, such as red or scaly skin.  You have fluid coming from your nipples that is not normal.  You find a new lump or thick  area.  You have breast pain.  You have any concerns about your breast health.  Summary  Breast self-awareness includes looking for changes in your breasts and feeling for changes within your breasts.  You should do breast self-awareness in front of a mirror in a well-lit room.  If you get monthly periods (menstrual cycles), the best time to check your breasts is 5-7 days after your period ends.  Tell your doctor about any changes you see in your breasts. Changes include changes in size, changes on the skin, painful or tender breasts, or fluid from your nipples that is not normal.  This information is not intended to replace advice given to you by your health care provider. Make sure you discuss any questions you have with your health care provider.  Document Revised: 05/25/2023 Document Reviewed: 10/20/2022  [employer] Patient Education © 2024 [employer]

## 2024-06-06 NOTE — LETTER
[facility]  Vaccine Consent Form    Patient Name:  Martina Hopkins  Patient :  1990     Vaccine(s) Ordered    Tdap Vaccine Greater Than or Equal To 6yo IM        Screening Checklist  The following questions should be completed prior to vaccination. If you answer “yes” to any question, it does not necessarily mean you should not be vaccinated. It just means we may need to clarify or ask more questions. If a question is unclear, please ask your healthcare provider to explain it.    Yes No   Any fever or moderate to severe illness today (mild illness and/or antibiotic treatment are not contraindications)?     Do you have a history of a serious reaction to any previous vaccinations, such as anaphylaxis, encephalopathy within 7 days, Guillain-Sparland syndrome within 6 weeks, seizure?     Have you received any live vaccine(s) (e.g MMR, VICTOR M) or any other vaccines in the last month (to ensure duplicate doses aren't given)?     Do you have an anaphylactic allergy to latex (DTaP, DTaP-IPV, Hep A, Hep B, MenB, RV, Td, Tdap), baker’s yeast (Hep B, HPV), polysorbates (RSV, nirsevimab, PCV 20, Rotavirrus, Tdap, Shingrix), or gelatin (VICTOR M, MMR)?     Do you have an anaphylactic allergy to neomycin (Rabies, VICTOR M, MMR, IPV, Hep A), polymyxin B (IPV), or streptomycin (IPV)?      Any cancer, leukemia, AIDS, or other immune system disorder? (VICTOR M, MMR, RV)     Do you have a parent, brother, or sister with an immune system problem (if immune competence of vaccine recipient clinically verified, can proceed)? (MMR, VICTOR M)     Any recent steroid treatments for >2 weeks, chemotherapy, or radiation treatment? (VICTOR M, MMR)     Have you received antibody-containing blood transfusions or IVIG in the past 11 months (recommended interval is dependent on product)? (MMR, VICTOR M)     Have you taken antiviral drugs (acyclovir, famciclovir, valacyclovir for VICTOR M) in the last 24 or 48 hours, respectively?      Are you pregnant or planning to become  "pregnant within 1 month? (VICTOR M, MMR, HPV, IPV, MenB, Abrexvy; For Hep B- refer to Engerix-B; For RSV - Abrysvo is indicated for 32-36 weeks of pregnancy from September to January)     For infants, have you ever been told your child has had intussusception or a medical emergency involving obstruction of the intestine (Rotavirus)? If not for an infant, can skip this question.         *Ordering Physicians/APC should be consulted if \"yes\" is checked by the patient or guardian above.  I have received, read, and understand the Vaccine Information Statement (VIS) for each vaccine ordered.  I have considered my or my child's health status as well as the health status of my close contacts.  I have taken the opportunity to discuss my vaccine questions with my or my child's health care provider.   I have requested that the ordered vaccine(s) be given to me or my child.  I understand the benefits and risks of the vaccines.  I understand that I should remain in the clinic for 15 minutes after receiving the vaccine(s).  _________________________________________________________  Signature of Patient or Parent/Legal Guardian ____________________  Date     "

## 2024-06-06 NOTE — PROGRESS NOTES
Subjective     Chief Complaint:  Physical Exam.    History of Present Illness    History obtained from the patient.    She is requesting a prescription for Meloxicam for her low back pain, right knee pain, bilateral hip pain, and left shoulder pain.  She reports occasional swelling and stiffness of her right knee.  She denies myalgias and paresthesias.  She works on a farm daily.    She is reporting a recurrence of her Tinea Versicolor on her shoulders and upper back.    The patient has a history of PCOS, currently off medication.  In the past, Metformin caused GI issues.  She states her menstrual issues have resolved.     The patient has Chronic Allergic Rhinitis, current symptoms mild chest congestion.  She reports relief with Zyrtec and Flonase.     Vitamin D Deficiency Follow-Up: The patient is here for follow-up of Vitamin D Deficiency, which has been stable.    Interval Events: Vitamin D level on 7/21/2023 was 27.1.    Symptoms: Reports chronic and arthralgias as above.  Denies fatigue, myalgias, paresthesias, loss of balance, and gait instability.    Medication: None.     Anxiety Disorder Follow-Up: The patient is here for follow-up of Generalized Anxiety Disorder, which has been stable overall.    Interval Events: She reports her symptoms are manageable because she is seeing a counselor twice per week.    Symptoms: Stable anxiety and insomnia.  Denies depression, panic attacks, anhedonia, memory loss, and concentration issues.    Associated Symptoms: Denies suicidal ideation or thoughts of self-harm.    Medication: Hydroxyzine as needed (usually once every 1 to 2 months) and Melatonin as needed.  Side Effects: She feels like she is more angry the day after she takes a Hydroxyzine.    Martina Hopkins is a 34 y.o. female who presents for an Annual Physical.      PMH, PSH, SocHx, FamHx, Allergies, and Medications: Reviewed and updated.    Outpatient Medications Prior to Visit   Medication Sig Dispense  Refill    famotidine (Pepcid) 40 MG tablet Take 1 tablet by mouth Every Night. 30 tablet 2    fluticasone (FLONASE) 50 MCG/ACT nasal spray 2 sprays into the nostril(s) as directed by provider Daily for 7 days. 9.9 mL 0    hydrOXYzine (ATARAX) 25 MG tablet 1/2-1 po every 6 hours prn anxiety 50 tablet 2    ondansetron (ZOFRAN) 4 MG tablet Take 1 tablet by mouth Every 6 (Six) Hours As Needed for Nausea or Vomiting. 8 tablet 0    docusate sodium (Colace) 100 MG capsule Take 1 capsule by mouth 2 (Two) Times a Day. (Patient not taking: Reported on 6/6/2024) 30 capsule 0    pantoprazole (PROTONIX) 40 MG EC tablet Take 1 tablet by mouth Every Morning. (Patient not taking: Reported on 6/6/2024) 30 tablet 0    sucralfate (CARAFATE) 1 g tablet Take 1 tablet by mouth 4 (Four) Times a Day. (Patient not taking: Reported on 6/6/2024) 20 tablet 0     No facility-administered medications prior to visit.       Immunization History   Administered Date(s) Administered    COVID-19 (PFIZER) Purple Cap Monovalent 02/25/2021, 08/09/2021    Fluzone (or Fluarix & Flulaval for VFC) >6mos 12/01/2023    HPV Quadrivalent 08/18/2009, 10/27/2009, 03/01/2010    Hepatitis A 08/18/2009, 05/10/2018    Hepatitis B Adult/Adolescent IM 08/15/2003, 10/10/2003, 04/23/2004    MMR 02/27/1993, 07/29/1997    Meningococcal Conjugate 08/18/2009    Td (TDVAX) 09/16/2014    Tdap 10/27/2009         Patient Active Problem List   Diagnosis    PCOS (polycystic ovarian syndrome)    Generalized anxiety disorder    Vitamin D deficiency       Health Habits:  Dental Exam. up to date  Eye Exam. up to date  Hearing Loss:  No  Exercise: 7 times/week.  Current exercise activities include: walking and horseback riding  Diet: Healthy  Multivitamin: Yes    Safe Driving:  Yes  Seat Belt:  Yes  Bike Helmet:  No  Skin Screening:  Yes  Sunscreen: No  SBE / MEGAN: Yes  Sexual Activity:  Yes  Birth Control:  N/A  STD Prevention:  N/A    Last Pap: Approximately December 2023, normal per  patient report (GYN).  Last Mammogram:  N/A  Last DEXA Scan: N/A  Last Colonoscopy: N/A  Last PSA: N/A    Social:    Social History     Socioeconomic History    Marital status:     Number of children: 0   Tobacco Use    Smoking status: Never     Passive exposure: Never    Smokeless tobacco: Never   Vaping Use    Vaping status: Never Used   Substance and Sexual Activity    Alcohol use: Yes     Comment: 2 cocktail daily    Drug use: Not Currently     Comment: experimented with multiple drugs age [age]    Sexual activity: Yes     Partners: Female     Birth control/protection: None         Current Medical Providers:    Taylor Ventura MD (Internal Medicine / Pediatrics)    The [facility] providers who are involved in the care of this patient are listed above.         Review of Systems   Constitutional:  Negative for chills, fatigue, fever and unexpected weight change.        No night sweats.    HENT:  Positive for congestion. Negative for ear discharge, ear pain, facial swelling, hearing loss, nosebleeds, postnasal drip, rhinorrhea, sinus pressure, sinus pain, sneezing, sore throat, tinnitus and voice change.         Denies snoring.   Eyes:  Negative for photophobia, pain, discharge, redness, itching and visual disturbance.   Respiratory:  Negative for cough, chest tightness, shortness of breath and wheezing.         No chest congestion.  No hemoptysis.   Cardiovascular:  Negative for chest pain, palpitations and leg swelling.        No orthopnea, WELLER, or PND.  No claudication or syncope.   Gastrointestinal:  Positive for abdominal pain (occasional twinge in the epigastric area). Negative for blood in stool, constipation, diarrhea, nausea, rectal pain and vomiting.        No melena.  No hematemesis.  No heartburn, dysphagia or odynophagia.  No early satiety, belching, or bloating.    Endocrine: Negative for cold intolerance, heat intolerance, polydipsia, polyphagia and polyuria.        No hair loss or dry  "skin.  No hot flashes.     Genitourinary:  Negative for difficulty urinating, dyspareunia, dysuria, flank pain, frequency, hematuria, menstrual problem, pelvic pain, urgency, vaginal bleeding and vaginal discharge.        No nocturia, incomplete emptying, or incontinence.   Musculoskeletal:  Positive for arthralgias, back pain and joint swelling. Negative for gait problem, myalgias, neck pain and neck stiffness.        No joint stiffness.   Skin:  Positive for rash.        No new skin lesions or changes in skin lesions. No breast pain or masses.  No nipple discharge or nipple inversion.   Allergic/Immunologic: Positive for environmental allergies.   Neurological:  Negative for dizziness, tremors, syncope, speech difficulty, weakness, light-headedness, numbness and headaches.        No tingling.  No memory loss.  No decreased concentration.   Hematological:  Negative for adenopathy. Does not bruise/bleed easily.   Psychiatric/Behavioral:  Positive for sleep disturbance. Negative for confusion, self-injury and suicidal ideas. The patient is nervous/anxious.         No depression.           Objective     Vitals:    06/06/24 0859   BP: 98/68   BP Location: Right arm   Patient Position: Sitting   Cuff Size: Adult   Pulse: 78   Resp: 16   Temp: 98 °F (36.7 °C)   TempSrc: Infrared   Weight: 80.3 kg (177 lb)   Height: 156.5 cm (61.61\")   PainSc: 0-No pain       Body mass index is 32.78 kg/m².    Physical Exam  Vitals and nursing note reviewed.   Constitutional:       Appearance: Normal appearance. She is well-developed.      Comments: BMI greater than 30   HENT:      Head: Normocephalic and atraumatic.      Right Ear: Tympanic membrane, ear canal and external ear normal.      Left Ear: Tympanic membrane, ear canal and external ear normal.      Mouth/Throat:      Mouth: Mucous membranes are moist. No oral lesions.      Pharynx: Oropharynx is clear.      Comments: Tonsils normal.  Eyes:      Extraocular Movements: Extraocular " movements intact.      Conjunctiva/sclera: Conjunctivae normal.      Pupils: Pupils are equal, round, and reactive to light.      Comments: Fundi normal bilaterally.   Neck:      Thyroid: No thyromegaly.      Vascular: No carotid bruit.   Cardiovascular:      Rate and Rhythm: Normal rate and regular rhythm.      Pulses: Normal pulses.      Heart sounds: Normal heart sounds. No murmur heard.     No friction rub. No gallop.   Pulmonary:      Effort: Pulmonary effort is normal.      Breath sounds: Normal breath sounds.   Chest:   Breasts:     Right: No inverted nipple, mass, nipple discharge, skin change or tenderness.      Left: No inverted nipple, mass, nipple discharge, skin change or tenderness.   Abdominal:      General: Bowel sounds are normal. There is no distension or abdominal bruit.      Palpations: Abdomen is soft. There is no hepatomegaly, splenomegaly or mass.      Tenderness: There is no abdominal tenderness.   Genitourinary:     Comments:  and rectal exam deferred.  Musculoskeletal:         General: Normal range of motion.      Cervical back: Normal range of motion and neck supple.      Right lower leg: No edema.      Left lower leg: No edema.   Lymphadenopathy:      Cervical: No cervical adenopathy.      Upper Body:      Right upper body: No supraclavicular or axillary adenopathy.      Left upper body: No supraclavicular or axillary adenopathy.   Skin:     Findings: Rash (hypopigmented patches bilateral posterior shoulders and upper back) present.      Comments: No atypical skin lesions.   Neurological:      Mental Status: She is alert.      Cranial Nerves: No cranial nerve deficit.      Motor: Motor function is intact. No abnormal muscle tone.      Coordination: Coordination is intact.      Gait: Gait is intact.      Deep Tendon Reflexes: Reflexes are normal and symmetric.   Psychiatric:         Mood and Affect: Mood normal.         PHQ-2 Depression Screening  Little interest or pleasure in doing  things? 0-->not at all   Feeling down, depressed, or hopeless? 0-->not at all   PHQ-2 Total Score 0         Counseling was given to patient for the following topics: appropriate exercise, healthy eating habits, disease prevention, risk factors for cancer, importance of self breast exam and breast health, importance of immunizations, including risks and benefits, sun safety, seatbelt use, safe driving, and helmet use. Also discussed the importance of regular dental and vision care, as well recommendation for a yearly screening skin exam after age 40.  Written information provided to patient on these topics and other health maintenance issues.    Results for orders placed or performed in visit on 06/06/24   POC Urinalysis Dipstick, Automated    Specimen: Urine   Result Value Ref Range    Color Yellow Yellow, Straw, Dark Yellow, Airam    Clarity, UA Clear Clear    Specific Gravity  1.005 1.005 - 1.030    pH, Urine 7.0 5.0 - 8.0    Leukocytes Negative Negative    Nitrite, UA Negative Negative    Protein, POC Negative Negative mg/dL    Glucose, UA Negative Negative mg/dL    Ketones, UA Negative Negative    Urobilinogen, UA Normal Normal, 0.2 E.U./dL    Bilirubin Negative Negative    Blood, UA Negative Negative    Lot Number [id number]     Expiration Date 10/31/2024          Diagnoses and all orders for this visit:    1. Encounter for health maintenance examination in adult (Primary)  -     POC Urinalysis Dipstick, Automated  -     Lipid Panel  -     Comprehensive Metabolic Panel  -     TSH  -     CBC & Differential    2. PCOS (polycystic ovarian syndrome)   Stable, no medication.    3. Vitamin D deficiency  -     Vitamin D,25-Hydroxy    4. Generalized anxiety disorder  -     busPIRone (BUSPAR) 5 MG tablet; Take 1 tablet by mouth 3 (Three) Times a Day As Needed (anxiety).  Dispense: 50 tablet; Refill: 2- NEW   Continue Hydroxyzine and Melatonin as needed.    5. Tinea versicolor  -     ketoconazole (NIZORAL) 2 % cream;  Apply 1 Application topically to the appropriate area as directed Daily for 14 days.  Dispense: 60 g; Refill: 0- NEW    6. Pain in joint, multiple sites  -     meloxicam (MOBIC) 15 MG tablet; Take 1 tablet by mouth Daily As Needed for Moderate Pain.  Dispense: 30 tablet; Refill: 2    7. Screening for cardiovascular condition  -     POC Urinalysis Dipstick, Automated  -     Lipid Panel  -     Comprehensive Metabolic Panel  -     TSH  -     CBC & Differential    8. Need for Tdap vaccination  -     Tdap Vaccine Greater Than or Equal To 6yo IM      I recommended the COVID-19 bivalent vaccine at the pharmacy, Fall 2024.      BMI is >= 30 and <35. (Class 1 Obesity). The following options were offered after discussion;: exercise counseling/recommendations and nutrition counseling/recommendations                Return for Annual physical, fasting.

## 2024-07-26 NOTE — TELEPHONE ENCOUNTER
Discontinue Lexapro.  Please add to allergies.  Recommend starting Wellbutrin  mg daily.  If she is agreeable, ok to call in # 30 with 5 refill.  Make sure she keeps her follow up appointment.   Detail Level: Zone Size Of Lesion In Cm (Optional): 0

## 2024-09-13 DIAGNOSIS — J30.2 SEASONAL ALLERGIC RHINITIS, UNSPECIFIED TRIGGER: ICD-10-CM

## 2024-09-13 RX ORDER — FLUTICASONE PROPIONATE 50 MCG
SPRAY, SUSPENSION (ML) NASAL
Qty: 16 G | Refills: 11 | Status: SHIPPED | OUTPATIENT
Start: 2024-09-13

## 2024-11-04 NOTE — TELEPHONE ENCOUNTER
----- Message from Birdie Murphy sent at 4/18/2017  1:38 PM EDT -----  Contact: JEANNETTE QUINTERO CALLING IN REGARDS TO THE LEXAPRO SHE WAS GIVEN. SHE WOULD RATHER TAKE IT AS NEEDED RATHER THAN CONTINUOUSLY. SHE JUST WANTED TO DISCUSS THIS. SHE CAN BE REACHED [phone]   no changes

## 2024-11-05 ENCOUNTER — OFFICE VISIT (OUTPATIENT)
Dept: INTERNAL MEDICINE | Facility: CLINIC | Age: 34
End: 2024-11-05
Payer: MEDICAID

## 2024-11-05 VITALS
WEIGHT: 182 LBS | BODY MASS INDEX: 34.39 KG/M2 | OXYGEN SATURATION: 98 % | DIASTOLIC BLOOD PRESSURE: 72 MMHG | HEART RATE: 60 BPM | SYSTOLIC BLOOD PRESSURE: 114 MMHG | RESPIRATION RATE: 14 BRPM | TEMPERATURE: 98 F

## 2024-11-05 DIAGNOSIS — Z23 NEED FOR COVID-19 VACCINE: ICD-10-CM

## 2024-11-05 DIAGNOSIS — K52.9 CHRONIC DIARRHEA: Primary | ICD-10-CM

## 2024-11-05 DIAGNOSIS — R63.5 ABNORMAL WEIGHT GAIN: ICD-10-CM

## 2024-11-05 DIAGNOSIS — Z13.6 SCREENING FOR CARDIOVASCULAR CONDITION: ICD-10-CM

## 2024-11-05 DIAGNOSIS — Z23 NEED FOR IMMUNIZATION AGAINST INFLUENZA: ICD-10-CM

## 2024-11-05 PROBLEM — E66.811 OBESITY (BMI 30.0-34.9): Status: ACTIVE | Noted: 2024-11-05

## 2024-11-05 LAB
ALBUMIN SERPL-MCNC: 4.2 G/DL (ref 3.5–5.2)
ALBUMIN/GLOB SERPL: 1.4 G/DL
ALP SERPL-CCNC: 46 U/L (ref 39–117)
ALT SERPL W P-5'-P-CCNC: 12 U/L (ref 1–33)
ANION GAP SERPL CALCULATED.3IONS-SCNC: 10.6 MMOL/L (ref 5–15)
AST SERPL-CCNC: 13 U/L (ref 1–32)
BASOPHILS # BLD AUTO: 0.02 10*3/MM3 (ref 0–0.2)
BASOPHILS NFR BLD AUTO: 0.4 % (ref 0–1.5)
BILIRUB SERPL-MCNC: 0.4 MG/DL (ref 0–1.2)
BUN SERPL-MCNC: 9 MG/DL (ref 6–20)
BUN/CREAT SERPL: 10.7 (ref 7–25)
CALCIUM SPEC-SCNC: 9.6 MG/DL (ref 8.6–10.5)
CHLORIDE SERPL-SCNC: 103 MMOL/L (ref 98–107)
CHOLEST SERPL-MCNC: 214 MG/DL (ref 0–200)
CO2 SERPL-SCNC: 24.4 MMOL/L (ref 22–29)
CREAT SERPL-MCNC: 0.84 MG/DL (ref 0.57–1)
CRP SERPL-MCNC: <0.3 MG/DL (ref 0–0.5)
DEPRECATED RDW RBC AUTO: 41.1 FL (ref 37–54)
EGFRCR SERPLBLD CKD-EPI 2021: 94.8 ML/MIN/1.73
EOSINOPHIL # BLD AUTO: 0.06 10*3/MM3 (ref 0–0.4)
EOSINOPHIL NFR BLD AUTO: 1.2 % (ref 0.3–6.2)
ERYTHROCYTE [DISTWIDTH] IN BLOOD BY AUTOMATED COUNT: 12.3 % (ref 12.3–15.4)
ERYTHROCYTE [SEDIMENTATION RATE] IN BLOOD: 6 MM/HR (ref 0–20)
GLOBULIN UR ELPH-MCNC: 3 GM/DL
GLUCOSE SERPL-MCNC: 88 MG/DL (ref 65–99)
HCT VFR BLD AUTO: 42.5 % (ref 34–46.6)
HDLC SERPL-MCNC: 66 MG/DL (ref 40–60)
HGB BLD-MCNC: 14.5 G/DL (ref 12–15.9)
IMM GRANULOCYTES # BLD AUTO: 0.02 10*3/MM3 (ref 0–0.05)
IMM GRANULOCYTES NFR BLD AUTO: 0.4 % (ref 0–0.5)
LDLC SERPL CALC-MCNC: 132 MG/DL (ref 0–100)
LDLC/HDLC SERPL: 1.97 {RATIO}
LYMPHOCYTES # BLD AUTO: 1.72 10*3/MM3 (ref 0.7–3.1)
LYMPHOCYTES NFR BLD AUTO: 34.3 % (ref 19.6–45.3)
MCH RBC QN AUTO: 31.4 PG (ref 26.6–33)
MCHC RBC AUTO-ENTMCNC: 34.1 G/DL (ref 31.5–35.7)
MCV RBC AUTO: 92 FL (ref 79–97)
MONOCYTES # BLD AUTO: 0.35 10*3/MM3 (ref 0.1–0.9)
MONOCYTES NFR BLD AUTO: 7 % (ref 5–12)
NEUTROPHILS NFR BLD AUTO: 2.85 10*3/MM3 (ref 1.7–7)
NEUTROPHILS NFR BLD AUTO: 56.7 % (ref 42.7–76)
NRBC BLD AUTO-RTO: 0 /100 WBC (ref 0–0.2)
PLATELET # BLD AUTO: 293 10*3/MM3 (ref 140–450)
PMV BLD AUTO: 9.9 FL (ref 6–12)
POTASSIUM SERPL-SCNC: 4.4 MMOL/L (ref 3.5–5.2)
PROT SERPL-MCNC: 7.2 G/DL (ref 6–8.5)
RBC # BLD AUTO: 4.62 10*6/MM3 (ref 3.77–5.28)
SODIUM SERPL-SCNC: 138 MMOL/L (ref 136–145)
T4 FREE SERPL-MCNC: 1.23 NG/DL (ref 0.92–1.68)
TRIGL SERPL-MCNC: 90 MG/DL (ref 0–150)
TSH SERPL DL<=0.05 MIU/L-ACNC: 1 UIU/ML (ref 0.27–4.2)
VLDLC SERPL-MCNC: 16 MG/DL (ref 5–40)
WBC NRBC COR # BLD AUTO: 5.02 10*3/MM3 (ref 3.4–10.8)

## 2024-11-05 PROCEDURE — 90471 IMMUNIZATION ADMIN: CPT | Performed by: INTERNAL MEDICINE

## 2024-11-05 PROCEDURE — 91320 SARSCV2 VAC 30MCG TRS-SUC IM: CPT | Performed by: INTERNAL MEDICINE

## 2024-11-05 PROCEDURE — 99214 OFFICE O/P EST MOD 30 MIN: CPT | Performed by: INTERNAL MEDICINE

## 2024-11-05 PROCEDURE — 90656 IIV3 VACC NO PRSV 0.5 ML IM: CPT | Performed by: INTERNAL MEDICINE

## 2024-11-05 PROCEDURE — 86258 DGP ANTIBODY EACH IG CLASS: CPT | Performed by: INTERNAL MEDICINE

## 2024-11-05 PROCEDURE — 85652 RBC SED RATE AUTOMATED: CPT | Performed by: INTERNAL MEDICINE

## 2024-11-05 PROCEDURE — 1159F MED LIST DOCD IN RCRD: CPT | Performed by: INTERNAL MEDICINE

## 2024-11-05 PROCEDURE — 1160F RVW MEDS BY RX/DR IN RCRD: CPT | Performed by: INTERNAL MEDICINE

## 2024-11-05 PROCEDURE — 80061 LIPID PANEL: CPT | Performed by: INTERNAL MEDICINE

## 2024-11-05 PROCEDURE — 86364 TISS TRNSGLTMNASE EA IG CLAS: CPT | Performed by: INTERNAL MEDICINE

## 2024-11-05 PROCEDURE — 80050 GENERAL HEALTH PANEL: CPT | Performed by: INTERNAL MEDICINE

## 2024-11-05 PROCEDURE — 84439 ASSAY OF FREE THYROXINE: CPT | Performed by: INTERNAL MEDICINE

## 2024-11-05 PROCEDURE — 1126F AMNT PAIN NOTED NONE PRSNT: CPT | Performed by: INTERNAL MEDICINE

## 2024-11-05 PROCEDURE — 82784 ASSAY IGA/IGD/IGG/IGM EACH: CPT | Performed by: INTERNAL MEDICINE

## 2024-11-05 PROCEDURE — 86140 C-REACTIVE PROTEIN: CPT | Performed by: INTERNAL MEDICINE

## 2024-11-05 PROCEDURE — 86231 EMA EACH IG CLASS: CPT | Performed by: INTERNAL MEDICINE

## 2024-11-05 PROCEDURE — 90480 ADMN SARSCOV2 VAC 1/ONLY CMP: CPT | Performed by: INTERNAL MEDICINE

## 2024-11-05 NOTE — LETTER
[facility]  Vaccine Consent Form    Patient Name:  Martina Hopkins  Patient :  1990     Vaccine(s) Ordered    COVID-19 (Pfizer) 12yrs+ (COMIRNATY)  Fluzone >6mos        Screening Checklist  The following questions should be completed prior to vaccination. If you answer “yes” to any question, it does not necessarily mean you should not be vaccinated. It just means we may need to clarify or ask more questions. If a question is unclear, please ask your healthcare provider to explain it.    Yes No   Any fever or moderate to severe illness today (mild illness and/or antibiotic treatment are not contraindications)?     Do you have a history of a serious reaction to any previous vaccinations, such as anaphylaxis, encephalopathy within 7 days, Guillain-[place] syndrome within 6 weeks, seizure?     Have you received any live vaccine(s) (e.g MMR, VICTOR M) or any other vaccines in the last month (to ensure duplicate doses aren't given)?     Do you have an anaphylactic allergy to latex (DTaP, DTaP-IPV, Hep A, Hep B, MenB, RV, Td, Tdap), baker’s yeast (Hep B, HPV), polysorbates (RSV, nirsevimab, PCV 20, Rotavirrus, Tdap, Shingrix), or gelatin (VICTOR M, MMR)?     Do you have an anaphylactic allergy to neomycin (Rabies, VICTOR M, MMR, IPV, Hep A), polymyxin B (IPV), or streptomycin (IPV)?      Any cancer, leukemia, AIDS, or other immune system disorder? (VICTOR M, MMR, RV)     Do you have a parent, brother, or sister with an immune system problem (if immune competence of vaccine recipient clinically verified, can proceed)? (MMR, VICTOR M)     Any recent steroid treatments for >2 weeks, chemotherapy, or radiation treatment? (VICTOR M, MMR)     Have you received antibody-containing blood transfusions or IVIG in the past 11 months (recommended interval is dependent on product)? (MMR, VICTOR M)     Have you taken antiviral drugs (acyclovir, famciclovir, valacyclovir for VICTOR M) in the last 24 or 48 hours, respectively?      Are you pregnant or planning to  "become pregnant within 1 month? (VICTOR M, MMR, HPV, IPV, MenB, Abrexvy; For Hep B- refer to Engerix-B; For RSV - Abrysvo is indicated for 32-36 weeks of pregnancy from September to January)     For infants, have you ever been told your child has had intussusception or a medical emergency involving obstruction of the intestine (Rotavirus)? If not for an infant, can skip this question.         *Ordering Physicians/APC should be consulted if \"yes\" is checked by the patient or guardian above.  I have received, read, and understand the Vaccine Information Statement (VIS) for each vaccine ordered.  I have considered my or my child's health status as well as the health status of my close contacts.  I have taken the opportunity to discuss my vaccine questions with my or my child's health care provider.   I have requested that the ordered vaccine(s) be given to me or my child.  I understand the benefits and risks of the vaccines.  I understand that I should remain in the clinic for 15 minutes after receiving the vaccine(s).  _________________________________________________________  Signature of Patient or Parent/Legal Guardian ____________________  Date     "

## 2024-11-05 NOTE — PROGRESS NOTES
"Subjective       Martina Hopkins is a 34 y.o. female.     Chief Complaint   Patient presents with    Diarrhea     chronic    PCOS     Weight gain       History obtained from the patient.      History of Present Illness     The patient is here for follow-up of chronic diarrhea.  She reports loose (but not watery) stools.  This has been going on for several years and did not worsen after her cholecystectomy 1 year ago.  She does states has episodes of blood in her stool every few months and has a history of a fissure in the past.  She denies abdominal pain, nausea, vomiting, constipation, and melena.  She states she had a normal Colonoscopy at age 23.    The patient was seen for her physical on 6/6/2024.  Cholesterol levels were normal (, TG 73).  However, the patient would like labs checked again due to inability to lose weight.  She has a history of PCOS and has been on Metformin and OCPs in the past.  She has gained 5 pounds since her last visit on 6/6/2024.  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  She reports a half healthy diet.  She drinks 1 beer daily and 2 bourbons on weekend days.  She denies chest pain, but states her chest \"feels funny\" for the past 3 to 4 days.  She has lightheadedness, dizziness, and increased heart rate when she gets up too fast.  There is no dyspnea, WELLER, orthopnea, PND, palpitations, or lower extremity edema.  She feels like her blood pressure or blood sugar goes up if she gets up too early in the morning.    Current Outpatient Medications on File Prior to Visit   Medication Sig Dispense Refill    busPIRone (BUSPAR) 5 MG tablet Take 1 tablet by mouth 3 (Three) Times a Day As Needed (anxiety). 50 tablet 2    fluticasone (FLONASE) 50 MCG/ACT nasal spray SPRAY TWO SPRAYS IN EACH NOSTRIL ONCE DAILY FOR 7 DAYS 16 g 11    hydrOXYzine (ATARAX) 25 MG tablet 1/2-1 po every 6 hours prn anxiety 50 tablet 2    meloxicam (MOBIC) 15 MG tablet Take 1 tablet by " "mouth Daily As Needed for Moderate Pain. 30 tablet 2     No current facility-administered medications on file prior to visit.       Current outpatient and discharge medications have been reconciled for the patient.  Reviewed by: Taylor Ventura MD        The following portions of the patient's history were reviewed and updated as appropriate: allergies, current medications, past family history, past medical history, past social history, past surgical history, and problem list.    Review of Systems   Constitutional:  Positive for unexpected weight change. Negative for fatigue.   Eyes:  Negative for visual disturbance.   Respiratory:  Negative for cough, shortness of breath and wheezing.    Cardiovascular:  Negative for chest pain, palpitations and leg swelling.        No WELLER, orthopnea, or claudication.   Gastrointestinal:  Positive for blood in stool and diarrhea. Negative for abdominal pain, constipation, nausea and vomiting.        Denies melena.   Endocrine: Negative for polydipsia and polyuria.   Musculoskeletal:  Negative for arthralgias and myalgias.   Neurological:  Positive for dizziness and light-headedness. Negative for syncope and headaches.        No memory issues.   Psychiatric/Behavioral:  Positive for sleep disturbance (wakes a lot- dog has seizures). Negative for decreased concentration. The patient is not nervous/anxious (but is \"super stressed\" daily).          Objective       Blood pressure 114/72, pulse 60, temperature 98 °F (36.7 °C), temperature source Infrared, resp. rate 14, weight 82.6 kg (182 lb), SpO2 98%, not currently breastfeeding.  Body mass index is 34.39 kg/m².      Physical Exam  Vitals and nursing note reviewed.   Constitutional:       Appearance: She is well-developed.      Comments: BMI greater than 30   Neck:      Thyroid: No thyroid mass or thyromegaly.      Vascular: No carotid bruit.   Cardiovascular:      Rate and Rhythm: Normal rate and regular rhythm.      Heart sounds: " Normal heart sounds. No murmur heard.  Pulmonary:      Effort: Pulmonary effort is normal.      Breath sounds: Normal breath sounds.   Abdominal:      General: Bowel sounds are normal. There is no distension.      Palpations: Abdomen is soft. There is no hepatomegaly, splenomegaly or mass.      Tenderness: There is no abdominal tenderness. There is no right CVA tenderness or left CVA tenderness.   Skin:     Findings: No rash.   Neurological:      Mental Status: She is alert.   Psychiatric:         Mood and Affect: Mood normal.         Assessment / Plan:  Diagnoses and all orders for this visit:    1. Chronic diarrhea (Primary)  -     Ambulatory Referral For Screening Colonoscopy  -     Comprehensive Metabolic Panel  -     TSH  -     CBC & Differential  -     T4, Free  -     Sedimentation Rate  -     C-reactive Protein  -     Celiac Comprehensive Panel    2. Abnormal weight gain  -     Comprehensive Metabolic Panel  -     TSH  -     CBC & Differential  -     T4, Free    3. Screening for cardiovascular condition  -     Lipid Panel    4. Need for immunization against influenza  -     Fluzone >6mos    5. Need for COVID-19 vaccine  -     COVID-19 (Pfizer) 12yrs+ (COMIRNATY)             The patient declined a referral to Nutritional Counseling.  She declined a prescription for metformin and other weight loss medications at this time.      No follow-ups on file.

## 2024-11-06 LAB
ENDOMYSIUM IGA SER QL: NEGATIVE
GLIADIN PEPTIDE IGA SER-ACNC: 4 UNITS (ref 0–19)
GLIADIN PEPTIDE IGG SER-ACNC: 2 UNITS (ref 0–19)
IGA SERPL-MCNC: 173 MG/DL (ref 87–352)
TTG IGA SER-ACNC: <2 U/ML (ref 0–3)
TTG IGG SER-ACNC: 2 U/ML (ref 0–5)

## 2024-11-07 ENCOUNTER — TELEPHONE (OUTPATIENT)
Dept: GASTROENTEROLOGY | Facility: CLINIC | Age: 34
End: 2024-11-07

## 2024-11-07 NOTE — TELEPHONE ENCOUNTER
HUB TRANSFERRED CALL. PATIENT RETURNING CALL TO SCHEDULE PROCEDURE TRANSFERRED TO [address] [place] [address]

## 2024-12-27 ENCOUNTER — TELEPHONE (OUTPATIENT)
Dept: GASTROENTEROLOGY | Facility: CLINIC | Age: 34
End: 2024-12-27
Payer: MEDICAID

## 2024-12-27 NOTE — TELEPHONE ENCOUNTER
CALLED -[phone] - HER ANTHEM MELONY WILL  26 - SEEING IF SHE WILL HAVE A NEW INS - LVM TO CALL [phone]

## 2025-01-08 RX ORDER — SODIUM, POTASSIUM,MAG SULFATES 17.5-3.13G
SOLUTION, RECONSTITUTED, ORAL ORAL
Qty: 354 ML | Refills: 0 | Status: SHIPPED | OUTPATIENT
Start: 2025-01-08

## 2025-01-15 ENCOUNTER — OUTSIDE FACILITY SERVICE (OUTPATIENT)
Dept: GASTROENTEROLOGY | Facility: CLINIC | Age: 35
End: 2025-01-15
Payer: MEDICAID

## 2025-01-15 PROCEDURE — 45380 COLONOSCOPY AND BIOPSY: CPT | Performed by: INTERNAL MEDICINE

## (undated) DEVICE — RX DELIVERY SYSTEM: Brand: NAVIFLEX™ RX DELIVERY SYSTEM

## (undated) DEVICE — RETRIEVAL BALLOON CATHETER: Brand: EXTRACTOR™ PRO RX

## (undated) DEVICE — SOLIDIFIER LIQ PREMISORB 1500CC

## (undated) DEVICE — DEV LK WIREGUIDE FUSN OLYMP SCP

## (undated) DEVICE — AIR/WATER CLEANING VALVES: Brand: AIR/WATER CLEANING VALVES

## (undated) DEVICE — FIRST STEP BEDSIDE ADD WATER KIT - RESEALABLE STAND-UP POUCH, ENDOSCOPIC CLEANING PAD - 1 POUCH: Brand: FIRST STEP BEDSIDE ADD WATER KIT - RESEALABLE STAND-UP POUCH, ENDOSCOPIC CLEANIN

## (undated) DEVICE — INTRO ACCSR BLNT TP

## (undated) DEVICE — HYBRID CO2 TUBING/CAP SET FOR OLYMPUS® SCOPES & CO2 SOURCE: Brand: ERBE

## (undated) DEVICE — THE BITE BLOCK MAXI, LATEX FREE STRAP IS USED TO PROTECT THE ENDOSCOPE INSERTION TUBE FROM BEING BITTEN BY THE PATIENT.

## (undated) DEVICE — SYR LUERLOK 50ML

## (undated) DEVICE — CANNULATING SPHINCTEROTOME: Brand: JAGTOME™ REVOLUTION RX

## (undated) DEVICE — BOWL UTIL STRL 32OZ

## (undated) DEVICE — SYR LL TP 10ML STRL

## (undated) DEVICE — LUBE GEL ENDOGLIDE 1.1OZ

## (undated) DEVICE — TUBING, SUCTION, 1/4" X 10', STRAIGHT: Brand: MEDLINE

## (undated) DEVICE — KT ORCA ORCAPOD DISP STRL

## (undated) DEVICE — CONTN GRAD MEAS TRIANG 32OZ BLK

## (undated) DEVICE — SINGLE USE DISTAL COVER MAJ-2315: Brand: SINGLE USE DISTAL COVER

## (undated) DEVICE — SAFELINER SUCTION CANISTER 1000CC: Brand: DEROYAL

## (undated) DEVICE — SOL IRR H2O BO 1000ML STRL

## (undated) DEVICE — SPHINCTEROTOME: Brand: DREAMTOME™ RX 44